# Patient Record
Sex: FEMALE | Race: WHITE | NOT HISPANIC OR LATINO | Employment: UNEMPLOYED | URBAN - METROPOLITAN AREA
[De-identification: names, ages, dates, MRNs, and addresses within clinical notes are randomized per-mention and may not be internally consistent; named-entity substitution may affect disease eponyms.]

---

## 2017-01-05 ENCOUNTER — ALLSCRIPTS OFFICE VISIT (OUTPATIENT)
Dept: OTHER | Facility: OTHER | Age: 22
End: 2017-01-05

## 2017-01-11 ENCOUNTER — GENERIC CONVERSION - ENCOUNTER (OUTPATIENT)
Dept: OTHER | Facility: OTHER | Age: 22
End: 2017-01-11

## 2017-01-18 ENCOUNTER — GENERIC CONVERSION - ENCOUNTER (OUTPATIENT)
Dept: OTHER | Facility: OTHER | Age: 22
End: 2017-01-18

## 2017-01-19 ENCOUNTER — GENERIC CONVERSION - ENCOUNTER (OUTPATIENT)
Dept: OTHER | Facility: OTHER | Age: 22
End: 2017-01-19

## 2017-01-21 ENCOUNTER — GENERIC CONVERSION - ENCOUNTER (OUTPATIENT)
Dept: OTHER | Facility: OTHER | Age: 22
End: 2017-01-21

## 2017-01-21 LAB — TSH SERPL DL<=0.05 MIU/L-ACNC: 1.12 UIU/ML (ref 0.45–4.5)

## 2017-01-24 LAB
C DIFF TOXIN B (HISTORICAL): NORMAL
MISCELLANEOUS LAB TEST RESULT (HISTORICAL): NORMAL

## 2017-02-07 ENCOUNTER — GENERIC CONVERSION - ENCOUNTER (OUTPATIENT)
Dept: OTHER | Facility: OTHER | Age: 22
End: 2017-02-07

## 2017-03-06 ENCOUNTER — GENERIC CONVERSION - ENCOUNTER (OUTPATIENT)
Dept: OTHER | Facility: OTHER | Age: 22
End: 2017-03-06

## 2017-03-21 ENCOUNTER — ALLSCRIPTS OFFICE VISIT (OUTPATIENT)
Dept: OTHER | Facility: OTHER | Age: 22
End: 2017-03-21

## 2017-04-12 ENCOUNTER — HOSPITAL ENCOUNTER (EMERGENCY)
Facility: HOSPITAL | Age: 22
Discharge: HOME | End: 2017-04-13
Attending: EMERGENCY MEDICINE | Admitting: EMERGENCY MEDICINE
Payer: COMMERCIAL

## 2017-04-12 VITALS
WEIGHT: 217 LBS | SYSTOLIC BLOOD PRESSURE: 133 MMHG | TEMPERATURE: 98.8 F | HEIGHT: 63 IN | RESPIRATION RATE: 20 BRPM | OXYGEN SATURATION: 98 % | DIASTOLIC BLOOD PRESSURE: 89 MMHG | BODY MASS INDEX: 38.45 KG/M2 | HEART RATE: 91 BPM

## 2017-04-12 DIAGNOSIS — N20.0 KIDNEY STONE: Primary | ICD-10-CM

## 2017-04-12 LAB
ANION GAP SERPL CALCULATED.3IONS-SCNC: 11 MMOL/L (ref 4–13)
BACTERIA UR QL AUTO: ABNORMAL /HPF
BASOPHILS # BLD AUTO: 0.2 THOUSANDS/ΜL (ref 0–0.1)
BASOPHILS NFR BLD AUTO: 1 % (ref 0–1)
BILIRUB UR QL STRIP: NEGATIVE
BUN SERPL-MCNC: 4 MG/DL (ref 5–25)
CALCIUM SERPL-MCNC: 8.8 MG/DL (ref 8.3–10.1)
CHLORIDE SERPL-SCNC: 102 MMOL/L (ref 100–108)
CLARITY UR: CLEAR
CLARITY, POC: NORMAL
CO2 SERPL-SCNC: 25 MMOL/L (ref 21–32)
COLOR UR: ABNORMAL
COLOR, POC: NORMAL
CREAT SERPL-MCNC: 0.7 MG/DL (ref 0.6–1.3)
EOSINOPHIL # BLD AUTO: 0.4 THOUSAND/ΜL (ref 0–0.61)
EOSINOPHIL NFR BLD AUTO: 2 % (ref 0–6)
ERYTHROCYTE [DISTWIDTH] IN BLOOD BY AUTOMATED COUNT: 13 % (ref 11.6–15.1)
EXT BILIRUBIN, UA: NORMAL
EXT BLOOD URINE: 250
EXT GLUCOSE, UA: NORMAL
EXT KETONES: NORMAL
EXT NITRITE, UA: NORMAL
EXT PH, UA: 7
EXT PROTEIN, UA: NORMAL
EXT SPECIFIC GRAVITY, UA: NORMAL
EXT UROBILINOGEN: NORMAL
GFR SERPL CREATININE-BSD FRML MDRD: >60 ML/MIN/1.73SQ M
GLUCOSE SERPL-MCNC: 85 MG/DL (ref 65–140)
GLUCOSE UR STRIP-MCNC: NEGATIVE MG/DL
HCG UR QL: NEGATIVE
HCT VFR BLD AUTO: 47.7 % (ref 37–47)
HGB BLD-MCNC: 15.8 G/DL (ref 12–16)
HGB UR QL STRIP.AUTO: ABNORMAL
KETONES UR STRIP-MCNC: NEGATIVE MG/DL
LEUKOCYTE ESTERASE UR QL STRIP: ABNORMAL
LYMPHOCYTES # BLD AUTO: 4.4 THOUSANDS/ΜL (ref 0.6–4.47)
LYMPHOCYTES NFR BLD AUTO: 23 % (ref 14–44)
MCH RBC QN AUTO: 30 PG (ref 27–31)
MCHC RBC AUTO-ENTMCNC: 33.1 G/DL (ref 31.4–37.4)
MCV RBC AUTO: 91 FL (ref 82–98)
MONOCYTES # BLD AUTO: 1.1 THOUSAND/ΜL (ref 0.17–1.22)
MONOCYTES NFR BLD AUTO: 6 % (ref 4–12)
NEUTROPHILS # BLD AUTO: 13 THOUSANDS/ΜL (ref 1.85–7.62)
NEUTS SEG NFR BLD AUTO: 68 % (ref 43–75)
NITRITE UR QL STRIP: NEGATIVE
NON-SQ EPI CELLS URNS QL MICRO: ABNORMAL /HPF
NRBC BLD AUTO-RTO: 0 /100 WBCS
PH UR STRIP.AUTO: 7 [PH] (ref 5–9)
PLATELET # BLD AUTO: 307 THOUSANDS/UL (ref 130–400)
PMV BLD AUTO: 8.9 FL (ref 8.9–12.7)
POTASSIUM SERPL-SCNC: 3.6 MMOL/L (ref 3.5–5.3)
PROT UR STRIP-MCNC: NEGATIVE MG/DL
RBC # BLD AUTO: 5.26 MILLION/UL (ref 4.2–5.4)
RBC #/AREA URNS AUTO: ABNORMAL /HPF
SODIUM SERPL-SCNC: 138 MMOL/L (ref 136–145)
SP GR UR STRIP.AUTO: 1.01 (ref 1–1.03)
UROBILINOGEN UR QL STRIP.AUTO: 0.2 E.U./DL
WBC # BLD AUTO: 19.1 THOUSAND/UL (ref 4.8–10.8)
WBC # BLD EST: 25 10*3/UL
WBC #/AREA URNS AUTO: ABNORMAL /HPF

## 2017-04-12 PROCEDURE — 85025 COMPLETE CBC W/AUTO DIFF WBC: CPT | Performed by: EMERGENCY MEDICINE

## 2017-04-12 PROCEDURE — 87086 URINE CULTURE/COLONY COUNT: CPT

## 2017-04-12 PROCEDURE — 80048 BASIC METABOLIC PNL TOTAL CA: CPT | Performed by: EMERGENCY MEDICINE

## 2017-04-12 PROCEDURE — 96374 THER/PROPH/DIAG INJ IV PUSH: CPT

## 2017-04-12 PROCEDURE — 81025 URINE PREGNANCY TEST: CPT

## 2017-04-12 PROCEDURE — 87186 SC STD MICRODIL/AGAR DIL: CPT

## 2017-04-12 PROCEDURE — 87077 CULTURE AEROBIC IDENTIFY: CPT

## 2017-04-12 PROCEDURE — 81001 URINALYSIS AUTO W/SCOPE: CPT

## 2017-04-12 PROCEDURE — 81002 URINALYSIS NONAUTO W/O SCOPE: CPT

## 2017-04-12 PROCEDURE — 96375 TX/PRO/DX INJ NEW DRUG ADDON: CPT

## 2017-04-12 PROCEDURE — 36415 COLL VENOUS BLD VENIPUNCTURE: CPT | Performed by: EMERGENCY MEDICINE

## 2017-04-12 PROCEDURE — 96361 HYDRATE IV INFUSION ADD-ON: CPT

## 2017-04-12 RX ORDER — ONDANSETRON 2 MG/ML
4 INJECTION INTRAMUSCULAR; INTRAVENOUS ONCE
Status: COMPLETED | OUTPATIENT
Start: 2017-04-12 | End: 2017-04-12

## 2017-04-12 RX ORDER — KETOROLAC TROMETHAMINE 30 MG/ML
30 INJECTION, SOLUTION INTRAMUSCULAR; INTRAVENOUS ONCE
Status: COMPLETED | OUTPATIENT
Start: 2017-04-12 | End: 2017-04-12

## 2017-04-12 RX ORDER — OXYCODONE HYDROCHLORIDE AND ACETAMINOPHEN 5; 325 MG/1; MG/1
1 TABLET ORAL EVERY 6 HOURS PRN
Qty: 10 TABLET | Refills: 0 | Status: SHIPPED | OUTPATIENT
Start: 2017-04-12 | End: 2017-04-15

## 2017-04-12 RX ORDER — ONDANSETRON 4 MG/1
4 TABLET, ORALLY DISINTEGRATING ORAL EVERY 8 HOURS PRN
Qty: 10 TABLET | Refills: 0 | Status: SHIPPED | OUTPATIENT
Start: 2017-04-12 | End: 2017-09-20

## 2017-04-12 RX ORDER — IBUPROFEN 600 MG/1
600 TABLET ORAL EVERY 6 HOURS PRN
Qty: 30 TABLET | Refills: 0 | Status: SHIPPED | OUTPATIENT
Start: 2017-04-12 | End: 2017-09-20

## 2017-04-12 RX ADMIN — SODIUM CHLORIDE 1000 ML: 0.9 INJECTION, SOLUTION INTRAVENOUS at 22:46

## 2017-04-12 RX ADMIN — ONDANSETRON 4 MG: 2 INJECTION INTRAMUSCULAR; INTRAVENOUS at 22:46

## 2017-04-12 RX ADMIN — KETOROLAC TROMETHAMINE 30 MG: 30 INJECTION, SOLUTION INTRAMUSCULAR at 22:46

## 2017-04-13 PROCEDURE — 99283 EMERGENCY DEPT VISIT LOW MDM: CPT

## 2017-04-15 LAB — BACTERIA UR CULT: NORMAL

## 2017-08-19 ENCOUNTER — ALLSCRIPTS OFFICE VISIT (OUTPATIENT)
Dept: OTHER | Facility: OTHER | Age: 22
End: 2017-08-19

## 2017-08-19 LAB
BILIRUB UR QL STRIP: NORMAL
CLARITY UR: NORMAL
COLOR UR: YELLOW
GLUCOSE (HISTORICAL): NORMAL
HGB UR QL STRIP.AUTO: NORMAL
KETONES UR STRIP-MCNC: NORMAL MG/DL
LEUKOCYTE ESTERASE UR QL STRIP: NORMAL
NITRITE UR QL STRIP: NORMAL
PH UR STRIP.AUTO: 6.5 [PH]
PROT UR STRIP-MCNC: NORMAL MG/DL
SP GR UR STRIP.AUTO: 1.01
UROBILINOGEN UR QL STRIP.AUTO: NORMAL

## 2017-09-20 ENCOUNTER — HOSPITAL ENCOUNTER (EMERGENCY)
Facility: HOSPITAL | Age: 22
Discharge: HOME | End: 2017-09-20
Attending: EMERGENCY MEDICINE | Admitting: EMERGENCY MEDICINE
Payer: COMMERCIAL

## 2017-09-20 VITALS
DIASTOLIC BLOOD PRESSURE: 89 MMHG | HEART RATE: 85 BPM | SYSTOLIC BLOOD PRESSURE: 144 MMHG | TEMPERATURE: 97.6 F | OXYGEN SATURATION: 99 % | RESPIRATION RATE: 20 BRPM

## 2017-09-20 DIAGNOSIS — M72.2 BILATERAL PLANTAR FASCIITIS: Primary | ICD-10-CM

## 2017-09-20 PROCEDURE — 99283 EMERGENCY DEPT VISIT LOW MDM: CPT

## 2017-09-20 RX ORDER — NAPROXEN 500 MG/1
500 TABLET ORAL ONCE
Status: COMPLETED | OUTPATIENT
Start: 2017-09-20 | End: 2017-09-20

## 2017-09-20 RX ORDER — TRAMADOL HYDROCHLORIDE 50 MG/1
50 TABLET ORAL EVERY 6 HOURS PRN
Qty: 30 TABLET | Refills: 0 | Status: SHIPPED | OUTPATIENT
Start: 2017-09-20 | End: 2018-09-30

## 2017-09-20 RX ORDER — TRAMADOL HYDROCHLORIDE 50 MG/1
50 TABLET ORAL ONCE
Status: COMPLETED | OUTPATIENT
Start: 2017-09-20 | End: 2017-09-20

## 2017-09-20 RX ORDER — NAPROXEN 500 MG/1
500 TABLET ORAL 2 TIMES DAILY WITH MEALS
Qty: 60 TABLET | Refills: 1 | Status: SHIPPED | OUTPATIENT
Start: 2017-09-20 | End: 2018-09-30

## 2017-09-20 RX ADMIN — NAPROXEN 500 MG: 500 TABLET ORAL at 22:55

## 2017-09-20 RX ADMIN — TRAMADOL HYDROCHLORIDE 50 MG: 50 TABLET, FILM COATED ORAL at 22:56

## 2017-09-29 NOTE — PRE-PROCEDURE INSTRUCTIONS
Pre-Surgery Instructions:   Medication Instructions    albuterol (PROVENTIL HFA,VENTOLIN HFA) 90 mcg/act inhaler Patient was instructed by Physician and understands   Fluticasone-Salmeterol (ADVAIR HFA IN) Patient was instructed by Physician and understands   levothyroxine 125 mcg tablet Patient was instructed by Physician and understands   naproxen (NAPROSYN) 500 mg tablet Patient was instructed by Physician and understands   sertraline (ZOLOFT) 50 mg tablet Patient was instructed by Physician and understands   traMADol (ULTRAM) 50 mg tablet Patient was instructed by Physician and understands  Pt to follow Dr Chastity Andres instructions   [facility] [phone]

## 2017-10-01 ENCOUNTER — ANESTHESIA EVENT (OUTPATIENT)
Dept: GASTROENTEROLOGY | Facility: AMBULARY SURGERY CENTER | Age: 22
End: 2017-10-01
Payer: COMMERCIAL

## 2017-10-02 ENCOUNTER — ANESTHESIA (OUTPATIENT)
Dept: GASTROENTEROLOGY | Facility: AMBULARY SURGERY CENTER | Age: 22
End: 2017-10-02
Payer: COMMERCIAL

## 2017-10-02 ENCOUNTER — HOSPITAL ENCOUNTER (OUTPATIENT)
Facility: AMBULARY SURGERY CENTER | Age: 22
Setting detail: OUTPATIENT SURGERY
Discharge: HOME | End: 2017-10-02
Attending: INTERNAL MEDICINE | Admitting: INTERNAL MEDICINE
Payer: COMMERCIAL

## 2017-10-02 ENCOUNTER — GENERIC CONVERSION - ENCOUNTER (OUTPATIENT)
Dept: OTHER | Facility: OTHER | Age: 22
End: 2017-10-02

## 2017-10-02 VITALS
BODY MASS INDEX: 38.45 KG/M2 | WEIGHT: 217 LBS | DIASTOLIC BLOOD PRESSURE: 76 MMHG | SYSTOLIC BLOOD PRESSURE: 118 MMHG | OXYGEN SATURATION: 100 % | HEART RATE: 80 BPM | RESPIRATION RATE: 18 BRPM | TEMPERATURE: 97.4 F | HEIGHT: 63 IN

## 2017-10-02 DIAGNOSIS — R10.9 ABDOMINAL PAIN: ICD-10-CM

## 2017-10-02 DIAGNOSIS — R19.7 DIARRHEA: ICD-10-CM

## 2017-10-02 DIAGNOSIS — R63.0 ANOREXIA: ICD-10-CM

## 2017-10-02 LAB — EXT PREGNANCY TEST URINE: NEGATIVE

## 2017-10-02 PROCEDURE — 81025 URINE PREGNANCY TEST: CPT | Performed by: ANESTHESIOLOGY

## 2017-10-02 PROCEDURE — 88342 IMHCHEM/IMCYTCHM 1ST ANTB: CPT | Performed by: INTERNAL MEDICINE

## 2017-10-02 PROCEDURE — 88305 TISSUE EXAM BY PATHOLOGIST: CPT | Performed by: INTERNAL MEDICINE

## 2017-10-02 RX ORDER — PROPOFOL 10 MG/ML
INJECTION, EMULSION INTRAVENOUS AS NEEDED
Status: DISCONTINUED | OUTPATIENT
Start: 2017-10-02 | End: 2017-10-02 | Stop reason: SURG

## 2017-10-02 RX ORDER — SODIUM CHLORIDE, SODIUM LACTATE, POTASSIUM CHLORIDE, CALCIUM CHLORIDE 600; 310; 30; 20 MG/100ML; MG/100ML; MG/100ML; MG/100ML
75 INJECTION, SOLUTION INTRAVENOUS CONTINUOUS
Status: DISCONTINUED | OUTPATIENT
Start: 2017-10-02 | End: 2017-10-02 | Stop reason: HOSPADM

## 2017-10-02 RX ADMIN — SODIUM CHLORIDE, SODIUM LACTATE, POTASSIUM CHLORIDE, AND CALCIUM CHLORIDE 75 ML/HR: .6; .31; .03; .02 INJECTION, SOLUTION INTRAVENOUS at 12:47

## 2017-10-02 RX ADMIN — PROPOFOL 40 MG: 10 INJECTION, EMULSION INTRAVENOUS at 14:17

## 2017-10-02 RX ADMIN — PROPOFOL 30 MG: 10 INJECTION, EMULSION INTRAVENOUS at 14:34

## 2017-10-02 RX ADMIN — PROPOFOL 40 MG: 10 INJECTION, EMULSION INTRAVENOUS at 14:32

## 2017-10-02 RX ADMIN — PROPOFOL 30 MG: 10 INJECTION, EMULSION INTRAVENOUS at 14:25

## 2017-10-02 RX ADMIN — PROPOFOL 30 MG: 10 INJECTION, EMULSION INTRAVENOUS at 14:26

## 2017-10-02 RX ADMIN — PROPOFOL 40 MG: 10 INJECTION, EMULSION INTRAVENOUS at 14:27

## 2017-10-02 RX ADMIN — PROPOFOL 70 MG: 10 INJECTION, EMULSION INTRAVENOUS at 14:14

## 2017-10-02 RX ADMIN — PROPOFOL 40 MG: 10 INJECTION, EMULSION INTRAVENOUS at 14:16

## 2017-10-02 RX ADMIN — PROPOFOL 30 MG: 10 INJECTION, EMULSION INTRAVENOUS at 14:19

## 2017-10-02 RX ADMIN — PROPOFOL 30 MG: 10 INJECTION, EMULSION INTRAVENOUS at 14:15

## 2017-10-02 RX ADMIN — PROPOFOL 30 MG: 10 INJECTION, EMULSION INTRAVENOUS at 14:31

## 2017-10-02 RX ADMIN — PROPOFOL 40 MG: 10 INJECTION, EMULSION INTRAVENOUS at 14:23

## 2017-10-02 RX ADMIN — PROPOFOL 30 MG: 10 INJECTION, EMULSION INTRAVENOUS at 14:30

## 2017-10-02 RX ADMIN — PROPOFOL 20 MG: 10 INJECTION, EMULSION INTRAVENOUS at 14:18

## 2017-10-02 RX ADMIN — PROPOFOL 30 MG: 10 INJECTION, EMULSION INTRAVENOUS at 14:21

## 2017-10-02 NOTE — OP NOTE
COLONOSCOPY    PROCEDURE: Colonoscopy/ Biopsy    INDICATIONS: Abdominal Pain, Chronic, Diarrhea    POST-OP DIAGNOSIS: See the impression below    SEDATION: Monitored anesthesia care, check anesthesia records    PHYSICAL EXAM:    /78   Pulse 85   Temp (!) 97 4 °F (36 3 °C) (Tympanic)   Resp 18   Ht 5' 3" (1 6 m)   Wt 98 4 kg (217 lb)   LMP 09/26/2017 (Approximate) Comment: not regular  SpO2 99%   Breastfeeding? No   BMI 38 44 kg/m²   Body mass index is 38 44 kg/m²  General: NAD  Heart: S1 & S2 normal, RRR  Lungs: CTA, No rales or rhonchi  Abdomen: Soft, nontender, nondistended, good bowel sounds    CONSENT:  Informed consent was obtained for the procedure, including sedation after explaining the risks and benefits of the procedure  Risks including but not limited to bleeding, perforation, infection, aspiration were discussed in detail  Also explained about less than 100%$ sensitivity with the exam and other alternatives  PREPARATION:   EKG tracing, pulse oximetry, blood pressure were monitored throughout the procedure  Patient was identified by myself both verbally and by visual inspection of ID band  DESCRIPTION:   Patient was placed in the left lateral decubitus position and was sedated with the above medication  Digital rectal examination was performed  The colonoscope was introduced in to the anal canal and advanced up to terminal ileum  A careful inspection was made as the colonoscope was withdrawn, including a retroflexed view of the rectum; findings and interventions are described below  Appropriate photodocumentation was obtained  The quality of the colonic preparation was good      FINDINGS:    Normal terminal ileum  Normal entire colonoscopy within normal retroflexion  Random biopsies taken throughout         IMPRESSIONS:      Normal examination to the terminal ileum  Random biopsies taken throughout the colon    RECOMMENDATIONS:    Discharge home  Resume regular diet  Resume home medications  Follow up biopsy results  Call with any abdominal pain, bleeding, fevers    COMPLICATIONS:  None; patient tolerated the procedure well      DISPOSITION: PACU           CONDITION: Stable

## 2017-10-02 NOTE — ANESTHESIA POSTPROCEDURE EVALUATION
Post-Op Assessment Note      CV Status:  Stable    Mental Status:  Awake    Hydration Status:  Stable    PONV Controlled:  None    Airway Patency:  Patent    Post Op Vitals Reviewed: Yes          Staff: Anesthesiologist           BP (!) 87/61 (10/02/17 1442)    Temp     Pulse 78 (10/02/17 1442)   Resp 18 (10/02/17 1441)    SpO2 96 % (10/02/17 1442)

## 2017-10-02 NOTE — ANESTHESIA PREPROCEDURE EVALUATION
Review of Systems/Medical History  Patient summary reviewed  Chart reviewed  No history of anesthetic complications     Cardiovascular   Pulmonary  Smoker cigarette smoker , Asthma: well controlled/ stable Asthma type of rescue: PRN nebulizer, ,        GI/Hepatic    GERD ,        Kidney stones,        Endo/Other  History of thyroid disease , hypothyroidism,      GYN       Hematology   Musculoskeletal  Obesity ,        Neurology   Psychology   Anxiety,            Physical Exam    Airway    Mallampati score: II  TM Distance: <3 FB  Neck ROM: full     Dental       Cardiovascular  Rhythm: regular, Rate: normal,     Pulmonary  Breath sounds clear to auscultation,     Other Findings        Anesthesia Plan  ASA Score- 2       Anesthesia Type- IV sedation with anesthesia        Induction- intravenous      Informed Consent  Anesthetic plan and risks discussed with patient

## 2017-10-02 NOTE — H&P
History and Physical -  [facility]  Fany Torres 23 y o  female MRN: [id number]    HPI: Fany Torres is a 23y o  year old female who presents with left sided abdominal pain and diarrhea  Review of Systems    Historical Information   Past Medical History:   Diagnosis Date    Abdominal pain     Altered bowel habits     constipation or diarrhea    Anxiety     Asthma     Back pain     Contact lens/glasses fitting     Disease of thyroid gland     hypothyroid    Heel pain, bilateral     left heel injected 17    Kidney stone      Past Surgical History:   Procedure Laterality Date    INDUCED       KNEE ARTHROSCOPY Left     cartilage shaving     Social History   History   Alcohol Use    Yes     Comment: occ     History   Drug Use No     History   Smoking Status    Current Every Day Smoker    Packs/day: 1     Years: 10 00    Types: Cigarettes   Smokeless Tobacco    Never Used     Family History   Problem Relation Age of Onset    Anxiety disorder Mother     Thyroid disease unspecified Mother      over or under active not sure    Hypothyroidism Sister     Asthma Sister        Meds/Allergies     Prescriptions Prior to Admission   Medication    albuterol (PROVENTIL HFA,VENTOLIN HFA) 90 mcg/act inhaler    Fluticasone-Salmeterol (ADVAIR HFA IN)    levothyroxine 125 mcg tablet    naproxen (NAPROSYN) 500 mg tablet    sertraline (ZOLOFT) 50 mg tablet    traMADol (ULTRAM) 50 mg tablet       Allergies   Allergen Reactions    Ciprofloxacin Other (See Comments)     Skin felt like it was "on fire"       Objective     Blood pressure 124/78, pulse 85, temperature (!) 97 4 °F (36 3 °C), temperature source Tympanic, resp  rate 18, height 5' 3" (1 6 m), weight 98 4 kg (217 lb), last menstrual period 2017, SpO2 99 %, not currently breastfeeding        PHYSICAL EXAM    Gen: NAD  CV: RRR  CHEST: Clear  ABD: soft, NT/ND  EXT: no edema  Neuro: AAO      ASSESSMENT/PLAN: This is a 23y o  year old female here for left sided abdominal pain and diarrhea       PLAN:   Procedure: egd/colonoscopy

## 2017-10-02 NOTE — OP NOTE
ESOPHAGOGASTRODUODENOSCOPY    PROCEDURE: EGD/ Biopsy    INDICATIONS: Abdominal pain, Epigastric and Diarrhea    POST-OP DIAGNOSIS: See the impression below    SEDATION: Monitored anesthesia care, check anesthesia records    PHYSICAL EXAM:    Vitals:    10/02/17 1235   BP: 124/78   Pulse: 85   Resp: 18   Temp: (!) 97 4 °F (36 3 °C)   SpO2: 99%    Body mass index is 38 44 kg/m²  General: NAD  Heart: S1 & S2 normal, RRR  Lungs: CTA, No rales or rhonchi  Abdomen: Soft, nontender, nondistended, good bowel sounds    CONSENT:  Informed consent was obtained for the procedure, including sedation after explaining the risks and benefits of the procedure  Risks including but not limited to bleeding, perforation, infection, aspiration were discussed in detail  Also explained about less than 100% sensitivity with the exam and other alternatives  PREPARATION:   EKG tracing, pulse oximetry, blood pressure were monitored throughout the procedure  Patient was identified by myself both verbally and by visual inspection of ID band  DESCRIPTION:   Patient was placed in the left lateral decubitus position and was sedated with the above medication  The gastroscope was introduced in to the oropharynx and the esophagus was intubated under direct visualization  Scope was passed down the esophagus up to 2nd part of the duodenum  A careful inspection was made as the gastroscope was withdrawn, including a retroflexed view of the stomach; findings and interventions are described below  FINDINGS:    #1  Esophagus and GEJ- grade B esophagitis    #2  Stomach- moderate gastritis with linear ulcers in the antrum, biopsies taken  Normal retroflexion    #3   Duodenum- normal duodenum biopsies taken         IMPRESSIONS:      Grade B esophagitis  Antral gastritis with linear gastric ulcers  Gastric biopsies taken  Normal duodenum, biopsied    RECOMMENDATIONS:     Discharged home  Resume regular diet  Resume home medications  Take daily PPI therapy  Follow up biopsy results  Call with any abdominal pain, bleeding, fevers    COMPLICATIONS:  None; patient tolerated the procedure well            DISPOSITION: PACU           CONDITION: Stable

## 2017-10-02 NOTE — DISCHARGE INSTRUCTIONS
Discharged home  Resume regular diet  Resume home medications  Take daily PPI therapy  Follow up biopsy results  Call with any abdominal pain, bleeding, fevers

## 2017-10-09 ENCOUNTER — GENERIC CONVERSION - ENCOUNTER (OUTPATIENT)
Dept: OTHER | Facility: OTHER | Age: 22
End: 2017-10-09

## 2017-12-26 ENCOUNTER — ALLSCRIPTS OFFICE VISIT (OUTPATIENT)
Dept: OTHER | Facility: OTHER | Age: 22
End: 2017-12-26

## 2017-12-27 ENCOUNTER — GENERIC CONVERSION - ENCOUNTER (OUTPATIENT)
Dept: OTHER | Facility: OTHER | Age: 22
End: 2017-12-27

## 2017-12-27 LAB
A/G RATIO (HISTORICAL): 1.5 (ref 1.2–2.2)
ALBUMIN SERPL BCP-MCNC: 4.5 G/DL (ref 3.5–5.5)
ALP SERPL-CCNC: 82 IU/L (ref 39–117)
ALT SERPL W P-5'-P-CCNC: 18 IU/L (ref 0–32)
AST SERPL W P-5'-P-CCNC: 15 IU/L (ref 0–40)
BILIRUB SERPL-MCNC: 0.2 MG/DL (ref 0–1.2)
BUN SERPL-MCNC: 5 MG/DL (ref 6–20)
BUN/CREA RATIO (HISTORICAL): 7 (ref 9–23)
CALCIUM SERPL-MCNC: 9.8 MG/DL (ref 8.7–10.2)
CHLORIDE SERPL-SCNC: 103 MMOL/L (ref 96–106)
CHOLEST SERPL-MCNC: 212 MG/DL (ref 100–199)
CO2 SERPL-SCNC: 24 MMOL/L (ref 18–29)
CREAT SERPL-MCNC: 0.74 MG/DL (ref 0.57–1)
EGFR AFRICAN AMERICAN (HISTORICAL): 131 ML/MIN/1.73
EGFR-AMERICAN CALC (HISTORICAL): 114 ML/MIN/1.73
GLUCOSE SERPL-MCNC: 102 MG/DL (ref 65–99)
HDLC SERPL-MCNC: 54 MG/DL
LDLC SERPL CALC-MCNC: 121 MG/DL (ref 0–99)
POTASSIUM SERPL-SCNC: 4.6 MMOL/L (ref 3.5–5.2)
SODIUM SERPL-SCNC: 142 MMOL/L (ref 134–144)
TOT. GLOBULIN, SERUM (HISTORICAL): 3.1 G/DL (ref 1.5–4.5)
TOTAL PROTEIN (HISTORICAL): 7.6 G/DL (ref 6–8.5)
TRIGL SERPL-MCNC: 186 MG/DL (ref 0–149)
TSH SERPL DL<=0.05 MIU/L-ACNC: 4.41 UIU/ML (ref 0.45–4.5)

## 2017-12-27 NOTE — PROGRESS NOTES
Assessment   1  Acute maxillary sinusitis, recurrence not specified (461 0) (J01 00)   2  GE reflux (530 81) (K21 9)   3  Hypothyroidism (244 9) (E03 9)   4  Exercise-induced bronchospasm (493 81) (J45 990)    Plan   Acute maxillary sinusitis, recurrence not specified    · Cefdinir 300 MG Oral Capsule; TAKE 2 CAPSULES DAILY   · Fluconazole 150 MG Oral Tablet; 1 Every Day x 1  Exercise-induced bronchospasm    · Advair Diskus 100-50 MCG/DOSE Inhalation Aerosol Powder Breath Activated; 1    INHALATION TWICE A DAY   · ProAir  (90 Base) MCG/ACT Inhalation Aerosol Solution; 2 puffs q4hrs    prn, swish and spit after each use  GE reflux    · Follow-up visit in 6 months Evaluation and Treatment  Follow-up  Status: Hold For -    Scheduling  Requested for: 37Yjk8868   · Avoid alcoholic beverages ; Status:Complete;   Done: 45GLE9752   · Several things can be done to help treat and prevent your gastric reflux ;    Status:Complete;   Done: 55YUH9074   · Shared Decision Making Aid given; Status:Complete;   Done: 26QXW7503  Hypothyroidism    · Levothyroxine Sodium 150 MCG Oral Tablet; TAKE 1 TABLET DAILY  Hypothyroidism, Screening for cardiovascular, respiratory, and genitourinary diseases,    Screening for diabetes mellitus (DM)    · (1) COMPREHENSIVE METABOLIC PANEL; Status:Active; Requested for:23Qas6450;    · (1) LIPID PANEL FASTING W DIRECT LDL REFLEX; Status:Active; Requested    for:22Sye3344;    · (1) TSH; Status:Active; Requested for:13Lhy6217;   Panic disorder    · Sertraline HCl - 50 MG Oral Tablet; 1 every day    Discussion/Summary   Possible side effects of new medications were reviewed with the patient/guardian today  The treatment plan was reviewed with the patient/guardian  The patient/guardian understands and agrees with the treatment plan      Provider Comments   Provider Comments:    sinusitis new stable f/u in future with abx      Chief Complaint   Pt c/o cough for the past days  er/cma        History of Present Illness   HPI: tsh stable last march 2017 helps, less rescue, rare before sick helps, less episodes, controlled, few months on ppi saw pud data with increased risk of ischemic CVA and chronic kidney damage with PPI use advised  Kutty smoking   clogged and nasal dc is green rescue use throat contacts sick    Gastroesophageal Reflux Disease (Follow-Up): The patient is being seen for follow-up of gastroesophageal reflux disease  The patient reports no change in the condition  Recent data with increased risk of ischemic CVA and chronic kidney damage with PPI use advised  She has had no significant interval events  The patient is currently asymptomatic  Hypothyroidism (Follow-Up): The patient is being seen for follow-up of hypothyroidism of undetermined etiology  The patient reports no change in the condition  Interval Events: needs q6m f/u   She has had no significant interval events  Interval symptoms:  new onset of weight gain  Exercise Induced Bronchospasm (Follow-Up): The patient is being seen for follow-up of exercise-induced bronchospasm  The patient reports doing well  She has had no significant interval events  Interval symptoms:  denies wheezing  Review of Systems        Cardiovascular: no chest pain  Respiratory: shortness of breath,-- cough-- and-- wheezing  Neurological: no dizziness-- and-- no fainting  Active Problems   1  Acquired ankle/foot deformity (736 70) (M21 969)   2  Anxiety (300 00) (F41 9)   3  Body mass index (BMI) of 38 0 to 38 9 in adult (V85 38) (Z68 38)   4  Diarrhea (787 91) (R19 7)   5  Difficulty walking (719 7) (R26 2)   6  Exercise-induced bronchospasm (493 81) (J45 990)   7  Fatigue (780 79) (R53 83)   8  GE reflux (530 81) (K21 9)   9  Heel pain (729 5) (M79 673)   10  Hemorrhoids (455 6) (K64 9)   11  Hypothyroidism (244 9) (E03 9)   12  Immunization due (V05 9) (Z23)   13  Nicotine dependence (305 1) (F17 200)   14   Panic disorder (300 01) (F41 0)   15  Pes planus, congenital (754 61) (Q66 50)   16  Plantar fibromatosis (728 71) (M72 2)   17  Screening for cardiovascular, respiratory, and genitourinary diseases      (V81 2,V81 4,V81 6) (Z13 6,Z13 83,Z13 89)   18  Screening for diabetes mellitus (DM) (V77 1) (Z13 1)   19  Tobacco use (305 1) (Z72 0)    Past Medical History   1  Acute asthmatic bronchitis (493 90) (J45 909)   2  Acute bronchitis (466 0) (J20 9)   3  Acute maxillary sinusitis (461 0) (J01 00)   4  History of Acute maxillary sinusitis (461 0) (J01 00)   5  History of Acute maxillary sinusitis, recurrence not specified (461 0) (J01 00)   6  History of Acute medial meniscal tear (836 0) (S83 249A)   7  History of Acute pharyngitis (462) (J02 9)   8  History of Acute upper respiratory infection (465 9) (J06 9)   9  History of Acute UTI (599 0) (N39 0)   10  History of Cellulitis (682 9) (L03 90)   11  History of Cough (786 2) (R05)   12  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)   13  History of Hand paresthesia (782 0) (R20 2)   14  History of abdominal pain (V13 89) (Z87 898)   15  History of acute sinusitis (V12 69) (Z87 09)   16  History of nausea and vomiting (V12 79) (Z87 898)   17  History of Joint pain, knee (719 46) (M25 569)   18  History of Loss of appetite (783 0) (R63 0)   19  History of Meniscus tear (836 2) (S83 209A)   20  History of Otitis media (382 9) (H66 90)   21  History of Shoulder joint pain, left   22  History of Strep throat (034 0) (J02 0)    Family History   Mother    1  Family history of Anxiety   2  Denied: Family history of Colon cancer   3  Denied: Family history of Crohn's disease without complication, unspecified     gastrointestinal tract location   4  Family history of hypothyroidism (V18 19) (Z83 49)   5  Denied: Family history of liver disease  Father    10  Denied: Family history of Colon cancer   7   Denied: Family history of Crohn's disease without complication, unspecified gastrointestinal tract location   8  Denied: Family history of liver disease  Maternal Relatives    9  Family history of Cancer (199 1) (C80 1)  Family History Reviewed: The family history was reviewed and updated today  Social History    · Alcohol use   · Current every day smoker (305 1) (F17 200)   · Tobacco use (305 1) (Z72 0)  The social history was reviewed and updated today  Surgical History   1  History of Knee Surgery    Current Meds    1  Advair Diskus 100-50 MCG/DOSE Inhalation Aerosol Powder Breath Activated; 1     INHALATION TWICE A DAY; Therapy: 99Upf8876 to (Last Rx:21Mar2017)  Requested for: 21Mar2017 Ordered   2  Levothyroxine Sodium 150 MCG Oral Tablet; TAKE 1 TABLET DAILY; Therapy: 91NIQ8536 to (Evaluate:20Jan2018)  Requested for: 04Tas8351; Last     Rx:21Dec2017 Ordered   3  Pantoprazole Sodium 40 MG Oral Tablet Delayed Release; Take 1 tablet twice daily; Therapy: 92WPI3488 to (Evaluate:30Jan2018)  Requested for: 31MGW6483; Last     Rx:02Oct2017 Ordered   4  ProAir  (90 Base) MCG/ACT Inhalation Aerosol Solution; 2 puffs q4hrs prn, swish     and spit after each use; Therapy: 72KBC9602 to (Last Rx:21Mar2017)  Requested for: 21Mar2017 Ordered   5  Sertraline HCl - 50 MG Oral Tablet; 1 every day; Therapy: 46TMJ2848 to (Last Rx:21Mar2017)  Requested for: 21Mar2017 Ordered    Allergies   1  Cipro   2  Cipro    Vitals    Recorded: 21ARQ7007 01:41PM   Temperature 98 2 F   Heart Rate 76   Respiration 20   Systolic 623   Diastolic 76   Height 5 ft 4 in   Weight 225 lb    BMI Calculated 38 62   BSA Calculated 2 06   LMP 50UHS8380     Physical Exam        Constitutional      General appearance: No acute distress, well appearing and well nourished  Eyes      Conjunctiva and lids: No swelling, erythema or discharge  Pupils and irises: Equal, round and reactive to light         Ears, Nose, Mouth, and Throat      External inspection of ears and nose: Normal  Otoscopic examination: Tympanic membranes translucent with normal light reflex  Canals patent without erythema  Nasal mucosa, septum, and turbinates: Abnormal   The bilateral nasal mucosa was boggy,-- edematous-- and-- red  Oropharynx: Normal with no erythema, edema, exudate or lesions  Pulmonary      Respiratory effort: No increased work of breathing or signs of respiratory distress  Auscultation of lungs: Clear to auscultation  Cardiovascular      Auscultation of heart: Normal rate and rhythm, normal S1 and S2, without murmurs  Examination of extremities for edema and/or varicosities: Normal        Carotid pulses: Normal        Abdomen      Abdomen: Abnormal   The abdomen was obese  Lymphatic      Palpation of lymph nodes in neck: No lymphadenopathy  Musculoskeletal      Gait and station: Normal        Digits and nails: Normal without clubbing or cyanosis  Inspection/palpation of joints, bones, and muscles: Normal        Skin      Skin and subcutaneous tissue: Normal without rashes or lesions  Psychiatric      Mood and affect: Normal           Results/Data   PHQ-2 Adult Depression Screening 38Erq7564 01:43PM User, [facility]      Test Name Result Flag Reference   PHQ-2 Adult Depression Score 0     Over the last two weeks, how often have you been bothered by any of the following problems?      Little interest or pleasure in doing things: Not at all - 0     Feeling down, depressed, or hopeless: Not at all - 0   PHQ-2 Adult Depression Screening Negative          Signatures    Electronically signed by : Marry Robles DO; Dec 27 2017 12:23AM EST                       (Author)

## 2017-12-28 LAB
ENDOMYSIAL IGA ANTIBODY (HISTORICAL): NEGATIVE
IGA (HISTORICAL): 280 MG/DL (ref 87–352)
INTERPRETATION (HISTORICAL): NORMAL
TTG IGA (HISTORICAL): <2 U/ML (ref 0–3)

## 2018-01-01 ENCOUNTER — GENERIC CONVERSION - ENCOUNTER (OUTPATIENT)
Dept: OTHER | Facility: OTHER | Age: 23
End: 2018-01-01

## 2018-01-11 NOTE — MISCELLANEOUS
Excuse from work on 1/23/16 and 1/27/16 due to illness      Electronically signed by:Ricky Rubin DO  Jan 27 2016  4:02PM EST Review

## 2018-01-11 NOTE — PROGRESS NOTES
Assessment    1  Acute maxillary sinusitis, recurrence not specified (461 0) (J01 00)   2  Hypothyroidism (244 9) (E03 9)   3  Tobacco use (305 1) (Z72 0)    Plan  Acute maxillary sinusitis, recurrence not specified    · Fluconazole 150 MG Oral Tablet; 1 Every Day x 1   · Clarithromycin 500 MG Oral Tablet; TAKE 1 TABLET TWICE DAILY UNTIL  FINISHED  SocHx: Tobacco use    · Decreasing the stress in your life may help your condition improve ; Status:Complete;    Done: 37USM4534   · Shared Decision Making Aid given; Status:Complete;   Done: 22MER7085   · You need to quit smoking ; Status:Complete;   Done: 93KLH6411    Discussion/Summary    Mucinex D   f/u if no better  The patient was counseled regarding risk factor reductions, impressions, risks and benefits of treatment options, importance of compliance with treatment  Chief Complaint  pt present c/o cough and cold  hg      History of Present Illness  HPI: cough, cold  bad  2w  using essential oils  phlegm  yellowish  darker green for a while  was in ER 1w ago  dx with allergies/asthma  given prednisone  not better  no otc except last pm  missed work days due to illness       Review of Systems    Cardiovascular: no chest pain  Neurological: no dizziness  Active Problems    1  Anxiety (300 00) (F41 9)   2  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)   3  Exercise-induced bronchospasm (493 81) (J45 990)   4  Fatigue (780 79) (R53 83)   5  Hand paresthesia (782 0) (R20 2)   6  Hypothyroidism (244 9) (E03 9)   7  Immunization due (V05 9) (Z23)   8  Shoulder joint pain, left   9  Tobacco use (305 1) (Z72 0)    Past Medical History    1  Acute maxillary sinusitis (461 0) (J01 00)   2  History of Acute maxillary sinusitis (461 0) (J01 00)   3  History of Acute medial meniscal tear (836 0) (S83 249A)   4  History of Acute pharyngitis (462) (J02 9)   5  Acute upper respiratory infection (465 9) (J06 9)   6  History of Cellulitis (682 9) (L03 90)   7   History of Cough (786  2) (R05)   8  History of acute sinusitis (V12 69) (Z87 09)   9  History of Joint pain, knee (719 46) (M25 569)   10  History of Meniscus tear (836 2) (S83 209A)   11  History of Otitis media (382 9) (H66 90)   12  History of Strep throat (034 0) (J02 0)    Family History    1  Family history of Anxiety   2  Family history of hypothyroidism (V18 19) (Z83 49)    3  Family history of Cancer (199 1) (C80 1)  Family History Reviewed: The family history was reviewed and updated today  Social History    · Alcohol use   · Current Every Day Smoker (305 1)   · Tobacco use (305 1) (Z72 0)  The social history was reviewed and is unchanged  Current Meds   1  Escitalopram Oxalate 10 MG Oral Tablet; Take 1 tablet daily; Therapy: 91QJW6847 to (Last Rx:18Mar2015)  Requested for: 53WWP2587 Ordered   2  Levothyroxine Sodium 125 MCG Oral Tablet; Take 1 tablet daily; Therapy: 39YOD4482 to (Last Rx:32Nog9622)  Requested for: 00ZAQ6546 Ordered   3  Proventil  (90 Base) MCG/ACT Inhalation Aerosol Solution; 2 puffs q4hrs prn,   swish and spit after each use; Therapy: 41EEO0901 to (Last Rx:18Tzf5340)  Requested for: 26Awn1527 Ordered   4  Qvar 80 MCG/ACT Inhalation Aerosol Solution; USE 1 PUFF TWICE DAILY  RINSE   MOUTH AFTER USE; Therapy: 43DON8676 to (Last Rx:89Dqc7784)  Requested for: 72Pik8852 Ordered    Allergies    1  Cipro    Vitals   Recorded: 02JRO1847 03:33PM   Temperature 98 6 F   Heart Rate 92   Respiration 18   Systolic 913   Diastolic 80   Height 5 ft 4 5 in   Weight 230 lb    BMI Calculated 38 87   BSA Calculated 2 09     Physical Exam    Constitutional   General appearance: No acute distress, well appearing and well nourished  Eyes   Conjunctiva and lids: No swelling, erythema or discharge  Pupils and irises: Equal, round and reactive to light      Ears, Nose, Mouth, and Throat   External inspection of ears and nose: Normal     Otoscopic examination: Tympanic membranes translucent with normal light reflex  Canals patent without erythema  Nasal mucosa, septum, and turbinates: Normal without edema or erythema  Oropharynx: Normal with no erythema, edema, exudate or lesions  Pulmonary   Respiratory effort: Abnormal   Respiratory Findings: barky cough and audible wheezing, but no stridor  Auscultation of lungs: Clear to auscultation  Cardiovascular   Auscultation of heart: Normal rate and rhythm, normal S1 and S2, without murmurs  Examination of extremities for edema and/or varicosities: Normal     Carotid pulses: Normal     Lymphatic   Palpation of lymph nodes in neck: No lymphadenopathy  Musculoskeletal   Gait and station: Normal     Digits and nails: Normal without clubbing or cyanosis  Inspection/palpation of joints, bones, and muscles: Normal     Skin   Skin and subcutaneous tissue: Normal without rashes or lesions      Psychiatric   Mood and affect: Normal          Signatures   Electronically signed by : Estuardo Gamboa DO; Jan 27 2016  9:07PM EST                       (Author)

## 2018-01-12 NOTE — RESULT NOTES
Verified Results  (1) TSH 01HAT8068 01:08PM Sabage Sjogren     Test Name Result Flag Reference   TSH 1 120 uIU/mL  0 450-4 500       Discussion/Summary   Please schedule an office appointment     Dr Naeem Isidro

## 2018-01-12 NOTE — RESULT NOTES
Verified Results  (1) TSH 38Erd7646 12:49PM Angelique Garcia     Test Name Result Flag Reference   TSH 0 644 uIU/mL  0 450-4 500       Plan  Hypothyroidism    · Levothyroxine Sodium 150 MCG Oral Tablet; TAKE 1 TABLET DAILY

## 2018-01-13 VITALS
OXYGEN SATURATION: 98 % | BODY MASS INDEX: 38.07 KG/M2 | HEART RATE: 98 BPM | WEIGHT: 223 LBS | RESPIRATION RATE: 16 BRPM | HEIGHT: 64 IN | DIASTOLIC BLOOD PRESSURE: 90 MMHG | SYSTOLIC BLOOD PRESSURE: 126 MMHG | TEMPERATURE: 97.8 F

## 2018-01-13 NOTE — RESULT NOTES
Discussion/Summary   Please inform the patient that there was very mild inflammation in her small intestine  I will order labs for celiac testing, please mailed to the patient  Biopsies from stomach were negative for H pylori  Biopsies for colon showed very mild inflammation with lymphocytes as well  Celiac sprue or food allergies could explain this  Please make sure that the patient follows up in the office in the next 1 to 2 months and has laboratory studies completed  She can take Imodium as needed for now  Verified Results  (1) TISSUE EXAM [id number] 02:24PM Orysia Parrot     Test Name Result Flag Reference   LAB AP CASE REPORT (Report)     Surgical Pathology Report             Case: [id number]                   Authorizing Provider: Angélica Guerrero MD      Collected:      10/02/2017 1424        Ordering Location:   [facility]  Received:      10/02/2017 [address] [place] [address]                                     Pathologist:      Yoni Castro MD                                 Specimens:  A) - Duodenum, duodenal bxs- r/o celiac sprue                             B) - Stomach, gastric bxs- r/o h-pylori                                C) - Colon, random colon bxs- r/o microscopic colitis   LAB AP FINAL DIAGNOSIS (Report)     A  Duodenum (biopsy):    - Small bowel mucosa with mild duodenitis including focal mild increase   in intraepithelial lymphocytes  - No villous atrophy     - No active inflammation, granulomas, organisms, dysplasia or neoplasia   identified  Comment:  Correlation with celiac serologic studies may be considered as clinically   indicated  B  Stomach (biopsy):    - Minimal chronic inactive gastritis involving antral and oxyntic   mucosa  - Suggestion of medication (PPI) effect  - Immunostain for H  pylori (with appropriate positive control) is   negative  - No intestinal metaplasia, dysplasia or neoplasia identified      C  Colon (random biopsy):    - Colonic mucosa with mild non-specific increase in intraepithelial   lymphocytes (correlate clinically)  - No granulomas, dysplasia or neoplasia identified  Electronically signed by Melissa Perry MD on 10/5/2017 at 12:49 PM   LAB AP NOTE      Interpretation performed at [facility], [address] [facility], [address] [place] [id number]  LAB AP SURGICAL ADDITIONAL INFORMATION (Report)     All controls performed with the immunohistochemical stains reported above   reacted appropriately  These tests were developed and their performance   characteristics determined by [facility] or   [address] [place] [address]  They may not be cleared or approved by the [place]  Food and Drug Administration  The FDA has determined that such clearance   or approval is not necessary  These tests are used for clinical purposes  They should not be regarded as investigational or for research  This   laboratory has been approved by [place] [address], designated as a high-complexity   laboratory and is qualified to perform these tests  LAB AP GROSS DESCRIPTION (Report)     A  The specimen is received in formalin, labeled with the patient's name   and hospital number, and is designated Duodenal biopsies  The specimen   consists of an aggregate of tan to pink soft tissue fragments measuring   1 1 x 0 6 x 0 1 cm  Entirely submitted  One cassette  B  The specimen is received in formalin, labeled with the patient's name   and hospital number, and is designated Gastric biopsies  The specimen   consists of 5 white to tan soft tissue fragments ranging in greatest   dimension from 0 3 to 0 5 centimeters  Entirely submitted  One cassette  C  The specimen is received in formalin, labeled with the patient's name   and hospital number, and is designated Random colon biopsies  The   specimen consists of an aggregate of white to tan soft tissue fragments   measuring 1 0 x 0 8 x 0 2 cm  Entirely submitted  One cassette      Note: The estimated total formalin fixation time based upon information   provided by the submitting clinician and the standard processing schedule   is less than 72 hours  LMS   LAB AP CLINICAL INFORMATION      Anorexia  Diarrhea  Abdominal pain  Plan  Diarrhea    · (1) CELIAC DISEASE AB PROFILE; Status:Active;  Requested EIC:[id number];

## 2018-01-14 VITALS
RESPIRATION RATE: 18 BRPM | HEART RATE: 80 BPM | DIASTOLIC BLOOD PRESSURE: 70 MMHG | BODY MASS INDEX: 37.05 KG/M2 | TEMPERATURE: 97.2 F | SYSTOLIC BLOOD PRESSURE: 110 MMHG | HEIGHT: 64 IN | WEIGHT: 217 LBS

## 2018-01-15 VITALS
RESPIRATION RATE: 16 BRPM | BODY MASS INDEX: 37.22 KG/M2 | WEIGHT: 218 LBS | DIASTOLIC BLOOD PRESSURE: 72 MMHG | TEMPERATURE: 96.9 F | HEART RATE: 80 BPM | HEIGHT: 64 IN | SYSTOLIC BLOOD PRESSURE: 104 MMHG

## 2018-01-16 NOTE — MISCELLANEOUS
We are showing a missed appointment for your procedure scheduled on _Feb 15, 2017____  Please contact our office at your earliest convenience to  reschedule your Colonoscopy/ EGD _  As a courtesy, we kindly request a 24 hour notice of cancellation so that we may offer the appointment slot to another patient in need        Thank you,   [facility]  Dr Max Bernal   [phone]        Electronically signed Cosmo Hernandez   Mar  6 2017  1:07PM EST Acknowledgement     Electronically signed Cosmo Hernandez   TNS  [phone]  1:07PM EST Author     Electronically signed Cosmo Hernandez   LVE  [phone]  1:08PM EST Author

## 2018-01-17 NOTE — RESULT NOTES
Verified Results  (LC) TSH Rfx on Abnormal to Free T4 [id number] 02:08PM Thad HoughLele     Test Name Result Flag Reference   TSH 1 810 uIU/mL  0 450-4 500       Discussion/Summary   Obi Lorenzo- I attempted to call you, but cannot get through  continue your current dose of Levothyroxine  The repeat TSH is normal   Repeat labs in 2 months and please schedule ultrasound of your thyroid as we discussed  Please call office for order for repeat labs    Adriana Mclain

## 2018-01-18 NOTE — RESULT NOTES
Message   Please inform patient that C  difficile studies were negative  Verified Results  ([facility]) C difficile, Cytotoxin B [id number] 10:00AM Tenzin Muller     Test Name Result Flag Reference   C difficile, Cytotoxin B Final report     Reference Range: Negative   Result 1 Comment     Negative  No cytotoxin detected

## 2018-01-22 VITALS
TEMPERATURE: 98.2 F | DIASTOLIC BLOOD PRESSURE: 76 MMHG | BODY MASS INDEX: 38.41 KG/M2 | RESPIRATION RATE: 20 BRPM | WEIGHT: 225 LBS | HEART RATE: 76 BPM | SYSTOLIC BLOOD PRESSURE: 112 MMHG | HEIGHT: 64 IN

## 2018-01-23 NOTE — RESULT NOTES
Discussion/Summary   Sugar is in prediabetes range but kidneys, minerals and liver are normal       Cholesterol profile is normal/ok  Diet/exercise/weight loss is recommended  Thyroid level is normal/good    Dr Maco Harvey        Verified Results  (1) COMPREHENSIVE METABOLIC PANEL [id number] 29:44XL Mal Core     Test Name Result Flag Reference   Glucose, Serum 102 mg/dL H 65-99   BUN 5 mg/dL L 6-20   Creatinine, Serum 0 74 mg/dL  0 57-1 00   BUN/Creatinine Ratio 7 L 9-23   Sodium, Serum 142 mmol/L  134-144   Potassium, Serum 4 6 mmol/L  3 5-5 2   Chloride, Serum 103 mmol/L     Carbon Dioxide, Total 24 mmol/L  18-29   Calcium, Serum 9 8 mg/dL  8 7-10 2   Protein, Total, Serum 7 6 g/dL  6 0-8 5   Albumin, Serum 4 5 g/dL  3 5-5 5   Globulin, Total 3 1 g/dL  1 5-4 5   A/G Ratio 1 5  1 2-2 2   Bilirubin, Total 0 2 mg/dL  0 0-1 2   Alkaline Phosphatase, S 82 IU/L     AST (SGOT) 15 IU/L  0-40   ALT (SGPT) 18 IU/L  0-32   eGFR If NonAfricn Am 114 mL/min/1 73  >59   eGFR If Africn Am 131 mL/min/1 73  >59     (1) LIPID PANEL FASTING W DIRECT LDL REFLEX [id number] 10:25AM Mal Core     Test Name Result Flag Reference   Cholesterol, Total 212 mg/dL H 100-199   Triglycerides 186 mg/dL H 0-149   HDL Cholesterol 54 mg/dL  >39   LDL Cholesterol Calc 121 mg/dL H 0-99     (1) TSH [id number] 10:25AM Mal Core     Test Name Result Flag Reference   TSH 4 410 uIU/mL  0 450-4 500

## 2018-02-06 DIAGNOSIS — E03.9 HYPOTHYROIDISM, UNSPECIFIED TYPE: Primary | ICD-10-CM

## 2018-02-07 PROBLEM — F41.0 PANIC DISORDER: Status: ACTIVE | Noted: 2017-03-21

## 2018-02-07 PROBLEM — K64.9 HEMORRHOIDS: Status: ACTIVE | Noted: 2017-01-05

## 2018-02-07 PROBLEM — K21.9 GE REFLUX: Status: ACTIVE | Noted: 2017-10-02

## 2018-02-07 PROBLEM — R73.01 IFG (IMPAIRED FASTING GLUCOSE): Status: ACTIVE | Noted: 2017-12-27

## 2018-02-07 RX ORDER — DICYCLOMINE HYDROCHLORIDE 10 MG/1
1 CAPSULE ORAL 3 TIMES DAILY
COMMUNITY
Start: 2017-01-05 | End: 2018-09-30

## 2018-02-07 RX ORDER — LEVOTHYROXINE SODIUM 0.12 MG/1
125 TABLET ORAL EVERY MORNING
Qty: 30 TABLET | Refills: 3 | Status: SHIPPED | OUTPATIENT
Start: 2018-02-07 | End: 2018-02-13 | Stop reason: SDUPTHER

## 2018-02-07 RX ORDER — LEVOTHYROXINE SODIUM 0.12 MG/1
125 TABLET ORAL EVERY MORNING
Qty: 90 TABLET | Refills: 1 | Status: SHIPPED | OUTPATIENT
Start: 2018-02-07 | End: 2018-02-13 | Stop reason: SDUPTHER

## 2018-02-07 RX ORDER — ALBUTEROL SULFATE 90 UG/1
2 AEROSOL, METERED RESPIRATORY (INHALATION) EVERY 4 HOURS PRN
COMMUNITY
Start: 2014-12-01 | End: 2018-09-30

## 2018-02-07 RX ORDER — LEVOTHYROXINE SODIUM 0.15 MG/1
1 TABLET ORAL DAILY
COMMUNITY
Start: 2015-12-18 | End: 2018-02-12 | Stop reason: SDUPTHER

## 2018-02-07 RX ORDER — PANTOPRAZOLE SODIUM 40 MG/1
1 TABLET, DELAYED RELEASE ORAL 2 TIMES DAILY
COMMUNITY
Start: 2017-10-02 | End: 2018-09-30

## 2018-02-12 DIAGNOSIS — E03.9 HYPOTHYROIDISM, UNSPECIFIED TYPE: Primary | ICD-10-CM

## 2018-02-12 RX ORDER — LEVOTHYROXINE SODIUM 0.15 MG/1
150 TABLET ORAL DAILY
Qty: 30 TABLET | Refills: 5 | Status: SHIPPED | OUTPATIENT
Start: 2018-02-12 | End: 2019-01-14 | Stop reason: SDUPTHER

## 2018-02-13 NOTE — TELEPHONE ENCOUNTER
Eden from [facility] calling regarding Levothyroxine  What dosage is right? Looks like there were 2 different prescriptions sent      thanks

## 2018-02-13 NOTE — TELEPHONE ENCOUNTER
Please advise Levothyroxine  125mcg and 150mcg are both active  Please advise  Irvin Jones is waiting for a decision    [facility]

## 2018-02-26 ENCOUNTER — TELEPHONE (OUTPATIENT)
Dept: FAMILY MEDICINE CLINIC | Facility: CLINIC | Age: 23
End: 2018-02-26

## 2018-02-26 DIAGNOSIS — N76.0 ACUTE VAGINITIS: Primary | ICD-10-CM

## 2018-02-26 RX ORDER — FLUCONAZOLE 150 MG/1
150 TABLET ORAL ONCE
Qty: 1 TABLET | Refills: 0 | Status: SHIPPED | OUTPATIENT
Start: 2018-02-26 | End: 2018-02-26

## 2018-02-26 RX ORDER — NORGESTIMATE AND ETHINYL ESTRADIOL 0.25-0.035
KIT ORAL
COMMUNITY
Start: 2018-01-23 | End: 2018-09-30

## 2018-02-26 NOTE — TELEPHONE ENCOUNTER
Pt calling for medication for a possible yeast infection-- medication goes to [facility]-please let pt know when this is done-lj

## 2018-09-30 ENCOUNTER — APPOINTMENT (EMERGENCY)
Dept: RADIOLOGY | Facility: HOSPITAL | Age: 23
End: 2018-09-30
Payer: COMMERCIAL

## 2018-09-30 ENCOUNTER — HOSPITAL ENCOUNTER (EMERGENCY)
Facility: HOSPITAL | Age: 23
Discharge: HOME | End: 2018-09-30
Attending: EMERGENCY MEDICINE
Payer: COMMERCIAL

## 2018-09-30 VITALS
TEMPERATURE: 98.9 F | WEIGHT: 202.2 LBS | OXYGEN SATURATION: 100 % | HEIGHT: 64 IN | HEART RATE: 116 BPM | BODY MASS INDEX: 34.52 KG/M2 | RESPIRATION RATE: 16 BRPM | SYSTOLIC BLOOD PRESSURE: 137 MMHG | DIASTOLIC BLOOD PRESSURE: 97 MMHG

## 2018-09-30 DIAGNOSIS — N39.0 UTI (URINARY TRACT INFECTION): Primary | ICD-10-CM

## 2018-09-30 LAB
ANION GAP SERPL CALCULATED.3IONS-SCNC: 8 MMOL/L (ref 4–13)
BACTERIA UR QL AUTO: ABNORMAL /HPF
BASOPHILS # BLD AUTO: 0.09 THOUSANDS/ΜL (ref 0–0.1)
BASOPHILS NFR BLD AUTO: 1 % (ref 0–1)
BILIRUB UR QL STRIP: NEGATIVE
BUN SERPL-MCNC: 5 MG/DL (ref 5–25)
CALCIUM SERPL-MCNC: 9 MG/DL (ref 8.3–10.1)
CHLORIDE SERPL-SCNC: 104 MMOL/L (ref 100–108)
CLARITY UR: CLEAR
CO2 SERPL-SCNC: 26 MMOL/L (ref 21–32)
COLOR UR: YELLOW
CREAT SERPL-MCNC: 0.71 MG/DL (ref 0.6–1.3)
EOSINOPHIL # BLD AUTO: 0.23 THOUSAND/ΜL (ref 0–0.61)
EOSINOPHIL NFR BLD AUTO: 1 % (ref 0–6)
ERYTHROCYTE [DISTWIDTH] IN BLOOD BY AUTOMATED COUNT: 12.4 % (ref 11.6–15.1)
EXT PREG TEST URINE: NEGATIVE
GFR SERPL CREATININE-BSD FRML MDRD: 119 ML/MIN/1.73SQ M
GLUCOSE SERPL-MCNC: 82 MG/DL (ref 65–140)
GLUCOSE UR STRIP-MCNC: NEGATIVE MG/DL
HCT VFR BLD AUTO: 46.8 % (ref 34.8–46.1)
HGB BLD-MCNC: 15.5 G/DL (ref 11.5–15.4)
HGB UR QL STRIP.AUTO: ABNORMAL
IMM GRANULOCYTES # BLD AUTO: 0.09 THOUSAND/UL (ref 0–0.2)
IMM GRANULOCYTES NFR BLD AUTO: 1 % (ref 0–2)
KETONES UR STRIP-MCNC: NEGATIVE MG/DL
LEUKOCYTE ESTERASE UR QL STRIP: NEGATIVE
LYMPHOCYTES # BLD AUTO: 5.4 THOUSANDS/ΜL (ref 0.6–4.47)
LYMPHOCYTES NFR BLD AUTO: 30 % (ref 14–44)
MCH RBC QN AUTO: 30.3 PG (ref 26.8–34.3)
MCHC RBC AUTO-ENTMCNC: 33.1 G/DL (ref 31.4–37.4)
MCV RBC AUTO: 92 FL (ref 82–98)
MONOCYTES # BLD AUTO: 1 THOUSAND/ΜL (ref 0.17–1.22)
MONOCYTES NFR BLD AUTO: 6 % (ref 4–12)
NEUTROPHILS # BLD AUTO: 11.33 THOUSANDS/ΜL (ref 1.85–7.62)
NEUTS SEG NFR BLD AUTO: 61 % (ref 43–75)
NITRITE UR QL STRIP: NEGATIVE
NON-SQ EPI CELLS URNS QL MICRO: ABNORMAL /HPF
NRBC BLD AUTO-RTO: 0 /100 WBCS
PH UR STRIP.AUTO: 6.5 [PH] (ref 5–9)
PLATELET # BLD AUTO: 356 THOUSANDS/UL (ref 149–390)
PMV BLD AUTO: 10.8 FL (ref 8.9–12.7)
POTASSIUM SERPL-SCNC: 3.6 MMOL/L (ref 3.5–5.3)
PROT UR STRIP-MCNC: NEGATIVE MG/DL
RBC # BLD AUTO: 5.11 MILLION/UL (ref 3.81–5.12)
RBC #/AREA URNS AUTO: ABNORMAL /HPF
SODIUM SERPL-SCNC: 138 MMOL/L (ref 136–145)
SP GR UR STRIP.AUTO: 1.02 (ref 1–1.03)
UROBILINOGEN UR QL STRIP.AUTO: 0.2 E.U./DL
WBC # BLD AUTO: 18.14 THOUSAND/UL (ref 4.31–10.16)
WBC #/AREA URNS AUTO: ABNORMAL /HPF

## 2018-09-30 PROCEDURE — 80048 BASIC METABOLIC PNL TOTAL CA: CPT | Performed by: PHYSICIAN ASSISTANT

## 2018-09-30 PROCEDURE — 85025 COMPLETE CBC W/AUTO DIFF WBC: CPT | Performed by: PHYSICIAN ASSISTANT

## 2018-09-30 PROCEDURE — 36415 COLL VENOUS BLD VENIPUNCTURE: CPT | Performed by: PHYSICIAN ASSISTANT

## 2018-09-30 PROCEDURE — 74176 CT ABD & PELVIS W/O CONTRAST: CPT

## 2018-09-30 PROCEDURE — 81001 URINALYSIS AUTO W/SCOPE: CPT | Performed by: PHYSICIAN ASSISTANT

## 2018-09-30 PROCEDURE — 81025 URINE PREGNANCY TEST: CPT | Performed by: PHYSICIAN ASSISTANT

## 2018-09-30 PROCEDURE — 99284 EMERGENCY DEPT VISIT MOD MDM: CPT

## 2018-09-30 RX ORDER — CEPHALEXIN 500 MG/1
500 CAPSULE ORAL EVERY 12 HOURS SCHEDULED
Qty: 14 CAPSULE | Refills: 0 | Status: SHIPPED | OUTPATIENT
Start: 2018-09-30 | End: 2018-10-07

## 2018-09-30 NOTE — DISCHARGE INSTRUCTIONS
Urinary Tract Infection in Women   AMBULATORY CARE:   A urinary tract infection (UTI)  is caused by bacteria that get inside your urinary tract  Most bacteria that enter your urinary tract come out when you urinate  If the bacteria stay in your urinary tract, you may get an infection  Your urinary tract includes your kidneys, ureters, bladder, and urethra  Urine is made in your kidneys, and it flows from the ureters to the bladder  Urine leaves the bladder through the urethra  A UTI is more common in your lower urinary tract, which includes your bladder and urethra  Common symptoms include the following:   · Urinating more often or waking from sleep to urinate    · Pain or burning when you urinate    · Pain or pressure in your lower abdomen     · Urine that smells bad    · Blood in your urine    · Leaking urine  Seek care immediately if:   · You are urinating very little or not at all  · You have a high fever with shaking chills  · You have side or back pain that gets worse  Contact your healthcare provider if:   · You have a fever  · You do not feel better after 2 days of taking antibiotics  · You are vomiting  · You have questions or concerns about your condition or care  Treatment for a UTI  may include medicines to treat a bacterial infection  You may also need medicines to decrease pain and burning, or decrease the urge to urinate often  Prevent a UTI:   · Empty your bladder often  Urinate and empty your bladder as soon as you feel the need  Do not hold your urine for long periods of time  · Wipe from front to back after you urinate or have a bowel movement  This will help prevent germs from getting into your urinary tract through your urethra  · Drink liquids as directed  Ask how much liquid to drink each day and which liquids are best for you  You may need to drink more liquids than usual to help flush out the bacteria  Do not drink alcohol, caffeine, or citrus juices  These can irritate your bladder and increase your symptoms  Your healthcare provider may recommend cranberry juice to help prevent a UTI  · Urinate after you have sex  This can help flush out bacteria passed during sex  · Do not douche or use feminine deodorants  These can change the chemical balance in your vagina  · Change sanitary pads or tampons often  This will help prevent germs from getting into your urinary tract  · Do pelvic muscle exercises often  Pelvic muscle exercises may help you start and stop urinating  Strong pelvic muscles may help you empty your bladder easier  Squeeze these muscles tightly for 5 seconds like you are trying to hold back urine  Then relax for 5 seconds  Gradually work up to squeezing for 10 seconds  Do 3 sets of 15 repetitions a day, or as directed  Follow up with your healthcare provider as directed:  Write down your questions so you remember to ask them during your visits  © 2017 [employer] Information is for End User's use only and may not be sold, redistributed or otherwise used for commercial purposes  All illustrations and images included in CareNotes® are the copyrighted property of [employer]  or Oni Dejesus  The above information is an  only  It is not intended as medical advice for individual conditions or treatments  Talk to your doctor, nurse or pharmacist before following any medical regimen to see if it is safe and effective for you

## 2018-09-30 NOTE — ED PROVIDER NOTES
History  Chief Complaint   Patient presents with    Back Pain     low  back pain radiating to low abdomen starting  this afternoon     Patient is a 47-year-old female presents for evaluation back pain and abdominal pain  Patient has a history of multiple kidney stones and the back pain feels similar to kidney stone episodes in the past   Additionally she complains urinary hesitancy and suprapubic abdominal pain  She feels chills but denies any fevers  Denies any falls or trauma  Denies chest pain or shortness of breath  Prior to Admission Medications   Prescriptions Last Dose Informant Patient Reported? Taking? albuterol (PROVENTIL HFA,VENTOLIN HFA) 90 mcg/act inhaler   Yes No   Sig: Inhale 2 puffs every 6 (six) hours as needed for wheezing  fluticasone-salmeterol (ADVAIR DISKUS) 100-50 mcg/dose   Yes No   Sig: Inhale   levothyroxine 150 mcg tablet   No No   Sig: Take 1 tablet (150 mcg total) by mouth daily      Facility-Administered Medications: None       Past Medical History:   Diagnosis Date    Abdominal pain     Altered bowel habits     constipation or diarrhea    Anxiety     Asthma     Back pain     Contact lens/glasses fitting     Disease of thyroid gland     hypothyroid    Heel pain, bilateral     left heel injected 17    Kidney stone        Past Surgical History:   Procedure Laterality Date    COLONOSCOPY N/A 10/2/2017    Procedure: COLONOSCOPY;  Surgeon: Raquel Brandt MD;  Location: [facility]; Service: Gastroenterology    ESOPHAGOGASTRODUODENOSCOPY N/A 10/2/2017    Procedure: ESOPHAGOGASTRODUODENOSCOPY (EGD); Surgeon: Raquel Brandt MD;  Location: [facility] GI LAB;   Service: Gastroenterology    INDUCED       KNEE ARTHROSCOPY Left     cartilage shaving       Family History   Problem Relation Age of Onset    Anxiety disorder Mother     Thyroid disease unspecified Mother         over or under active not sure    Hypothyroidism Sister     Asthma Sister I have reviewed and agree with the history as documented  Social History   Substance Use Topics    Smoking status: Current Every Day Smoker     Packs/day: 0 50     Years: 10 00     Types: Cigarettes    Smokeless tobacco: Never Used    Alcohol use Yes      Comment: occ        Review of Systems   Constitutional: Negative for activity change, appetite change and fatigue  HENT: Negative for nosebleeds, sneezing, sore throat, trouble swallowing and voice change  Eyes: Negative for photophobia, pain and visual disturbance  Respiratory: Negative for apnea, choking and stridor  Cardiovascular: Negative for palpitations and leg swelling  Gastrointestinal: Negative for anal bleeding and constipation  Endocrine: Negative for cold intolerance, heat intolerance, polydipsia and polyphagia  Genitourinary: Negative for decreased urine volume, enuresis, frequency, genital sores and urgency  Musculoskeletal: Negative for joint swelling and myalgias  Allergic/Immunologic: Negative for environmental allergies and food allergies  Neurological: Negative for tremors, seizures, speech difficulty and weakness  Hematological: Negative for adenopathy  Psychiatric/Behavioral: Negative for behavioral problems, decreased concentration, dysphoric mood and hallucinations  Physical Exam  Physical Exam   Constitutional: She is oriented to person, place, and time  She appears well-developed and well-nourished  No distress  HENT:   Head: Normocephalic and atraumatic  Right Ear: External ear normal    Left Ear: External ear normal    Nose: Nose normal    Mouth/Throat: Oropharynx is clear and moist    Eyes: Pupils are equal, round, and reactive to light  Conjunctivae and EOM are normal    Neck: Normal range of motion  Neck supple  Cardiovascular: Normal rate, regular rhythm and normal heart sounds  Exam reveals no gallop and no friction rub  No murmur heard    Pulmonary/Chest: Effort normal and breath sounds normal  No respiratory distress  She has no wheezes  Abdominal: Soft  Bowel sounds are normal  She exhibits no distension  There is tenderness in the suprapubic area  Neurological: She is alert and oriented to person, place, and time  Skin: Skin is warm and dry  She is not diaphoretic  Psychiatric: She has a normal mood and affect  Her behavior is normal    Vitals reviewed  Vital Signs  ED Triage Vitals [09/30/18 1519]   Temperature Pulse Respirations Blood Pressure SpO2   98 9 °F (37 2 °C) (!) 116 16 137/97 100 %      Temp Source Heart Rate Source Patient Position - Orthostatic VS BP Location FiO2 (%)   Tympanic Monitor Sitting Right arm --      Pain Score       7           Vitals:    09/30/18 1519   BP: 137/97   Pulse: (!) 116   Patient Position - Orthostatic VS: Sitting       Visual Acuity      ED Medications  Medications - No data to display    Diagnostic Studies  Results Reviewed     Procedure Component Value Units Date/Time    POCT pregnancy, urine [[id number]]  (Normal) Resulted:  09/30/18 1731    Lab Status:  Final result Updated:  09/30/18 1731     EXT PREG TEST UR (Ref: Negative) negative    Basic metabolic panel [[id number]] Collected:  09/30/18 1655    Lab Status:  Final result Specimen:  Blood from Arm, Right Updated:  09/30/18 1720     Sodium 138 mmol/L      Potassium 3 6 mmol/L      Chloride 104 mmol/L      CO2 26 mmol/L      ANION GAP 8 mmol/L      BUN 5 mg/dL      Creatinine 0 71 mg/dL      Glucose 82 mg/dL      Calcium 9 0 mg/dL      eGFR 119 ml/min/1 73sq m     Narrative:         National Kidney Disease Education Program recommendations are as follows:  GFR calculation is accurate only with a steady state creatinine  Chronic Kidney disease less than 60 ml/min/1 73 sq  meters  Kidney failure less than 15 ml/min/1 73 sq  meters      Urine Microscopic [[id number]]  (Abnormal) Collected:  09/30/18 1657    Lab Status:  Final result Specimen:  Urine from Urine, Clean Catch Updated: 09/30/18 1715     RBC, UA 0-1 (A) /hpf      WBC, UA None Seen /hpf      Epithelial Cells Moderate (A) /hpf      Bacteria, UA None Seen /hpf     UA w Reflex to Microscopic w Reflex to Culture [[id number]]  (Abnormal) Collected:  09/30/18 1657    Lab Status:  Final result Specimen:  Urine from Urine, Clean Catch Updated:  09/30/18 1709     Color, UA Yellow     Clarity, UA Clear     Specific Gravity, UA 1 025     pH, UA 6 5     Leukocytes, UA Negative     Nitrite, UA Negative     Protein, UA Negative mg/dl      Glucose, UA Negative mg/dl      Ketones, UA Negative mg/dl      Urobilinogen, UA 0 2 E U /dl      Bilirubin, UA Negative     Blood, UA Small (A)    CBC and differential [[id number]]  (Abnormal) Collected:  09/30/18 1655    Lab Status:  Final result Specimen:  Blood from Arm, Right Updated:  09/30/18 1709     WBC 18 14 (H) Thousand/uL      RBC 5 11 Million/uL      Hemoglobin 15 5 (H) g/dL      Hematocrit 46 8 (H) %      MCV 92 fL      MCH 30 3 pg      MCHC 33 1 g/dL      RDW 12 4 %      MPV 10 8 fL      Platelets 220 Thousands/uL      nRBC 0 /100 WBCs      Neutrophils Relative 61 %      Immat GRANS % 1 %      Lymphocytes Relative 30 %      Monocytes Relative 6 %      Eosinophils Relative 1 %      Basophils Relative 1 %      Neutrophils Absolute 11 33 (H) Thousands/µL      Immature Grans Absolute 0 09 Thousand/uL      Lymphocytes Absolute 5 40 (H) Thousands/µL      Monocytes Absolute 1 00 Thousand/µL      Eosinophils Absolute 0 23 Thousand/µL      Basophils Absolute 0 09 Thousands/µL                  CT renal stone study abdomen pelvis wo contrast   Final Result by Dawit Chopra MD (09/30 1818)         1  There is a tiny nonobstructing right renal calculus  There are no left renal calculi  There is no evidence of obstructive uropathy               Workstation performed: [id number]                    Procedures  Procedures       Phone Contacts  ED Phone Contact    ED Course MDM  CritCare Time    Disposition  Final diagnoses:   UTI (urinary tract infection)     Time reflects when diagnosis was documented in both MDM as applicable and the Disposition within this note     Time User Action Codes Description Comment    9/30/2018  6:42 PM Megan Loyola Add [N39 0] UTI (urinary tract infection)       ED Disposition     ED Disposition Condition Comment    [facility]  Condition at discharge: Stable        Follow-up Information     Follow up With Specialties Details Why [facility] [facility],  Family Medicine Schedule an appointment as soon as possible for a visit  [address] [place] [address]  [phone]            Patient's Medications   Discharge Prescriptions    CEPHALEXIN (KEFLEX) 500 MG CAPSULE    Take 1 capsule (500 mg total) by mouth every 12 (twelve) hours for 7 days       Start Date: 9/30/2018 End Date: 10/7/2018       Order Dose: 500 mg       Quantity: 14 capsule    Refills: 0    CEPHALEXIN (KEFLEX) 500 MG CAPSULE    Take 1 capsule (500 mg total) by mouth every 12 (twelve) hours for 7 days       Start Date: 9/30/2018 End Date: 10/7/2018       Order Dose: 500 mg       Quantity: 14 capsule    Refills: 0     No discharge procedures on file      ED Provider  Electronically Signed by           Rodo Fournier PA-C  09/30/18 6016

## 2018-10-01 NOTE — ED ATTENDING ATTESTATION
Checo Guillen MD, saw and evaluated the patient  I have discussed the patient with the resident/non-physician practitioner and agree with the resident's/non-physician practitioner's findings, Plan of Care, and MDM as documented in the resident's/non-physician practitioner's note, except where noted  All available labs and Radiology studies were reviewed  At this point I agree with the current assessment done in the Emergency Department    I have conducted an independent evaluation of this patient a history and physical is as follows:      Critical Care Time  CritCare Time    Procedures

## 2018-10-31 ENCOUNTER — TELEPHONE (OUTPATIENT)
Dept: FAMILY MEDICINE CLINIC | Facility: CLINIC | Age: 23
End: 2018-10-31

## 2018-11-06 ENCOUNTER — OFFICE VISIT (OUTPATIENT)
Dept: FAMILY MEDICINE CLINIC | Facility: CLINIC | Age: 23
End: 2018-11-06
Payer: COMMERCIAL

## 2018-11-06 VITALS
DIASTOLIC BLOOD PRESSURE: 86 MMHG | HEIGHT: 64 IN | RESPIRATION RATE: 18 BRPM | HEART RATE: 86 BPM | TEMPERATURE: 98 F | WEIGHT: 200 LBS | BODY MASS INDEX: 34.15 KG/M2 | SYSTOLIC BLOOD PRESSURE: 130 MMHG

## 2018-11-06 DIAGNOSIS — R10.30 LOWER ABDOMINAL PAIN: Primary | ICD-10-CM

## 2018-11-06 DIAGNOSIS — G89.29 CHRONIC RIGHT-SIDED LOW BACK PAIN WITHOUT SCIATICA: ICD-10-CM

## 2018-11-06 DIAGNOSIS — K25.9 GASTRIC ULCER, UNSPECIFIED CHRONICITY, UNSPECIFIED WHETHER GASTRIC ULCER HEMORRHAGE OR PERFORATION PRESENT: ICD-10-CM

## 2018-11-06 DIAGNOSIS — M54.50 CHRONIC RIGHT-SIDED LOW BACK PAIN WITHOUT SCIATICA: ICD-10-CM

## 2018-11-06 DIAGNOSIS — K29.70 GASTRITIS, PRESENCE OF BLEEDING UNSPECIFIED, UNSPECIFIED CHRONICITY, UNSPECIFIED GASTRITIS TYPE: ICD-10-CM

## 2018-11-06 LAB
SL AMB  POCT GLUCOSE, UA: NORMAL
SL AMB LEUKOCYTE ESTERASE,UA: NORMAL
SL AMB POCT BILIRUBIN,UA: NORMAL
SL AMB POCT BLOOD,UA: NORMAL
SL AMB POCT CLARITY,UA: CLEAR
SL AMB POCT COLOR,UA: YELLOW
SL AMB POCT KETONES,UA: NORMAL
SL AMB POCT NITRITE,UA: NORMAL
SL AMB POCT PH,UA: 7
SL AMB POCT SPECIFIC GRAVITY,UA: 1
SL AMB POCT URINE PROTEIN: NORMAL
SL AMB POCT UROBILINOGEN: NORMAL

## 2018-11-06 PROCEDURE — 3008F BODY MASS INDEX DOCD: CPT | Performed by: NURSE PRACTITIONER

## 2018-11-06 PROCEDURE — 81003 URINALYSIS AUTO W/O SCOPE: CPT | Performed by: NURSE PRACTITIONER

## 2018-11-06 PROCEDURE — 99214 OFFICE O/P EST MOD 30 MIN: CPT | Performed by: NURSE PRACTITIONER

## 2018-11-06 RX ORDER — PREDNISONE 10 MG/1
TABLET ORAL
Qty: 20 TABLET | Refills: 0 | Status: SHIPPED | OUTPATIENT
Start: 2018-11-06 | End: 2018-11-16

## 2018-11-06 RX ORDER — PANTOPRAZOLE SODIUM 40 MG/1
40 TABLET, DELAYED RELEASE ORAL DAILY
Qty: 30 TABLET | Refills: 0 | Status: SHIPPED | OUTPATIENT
Start: 2018-11-06 | End: 2019-04-22 | Stop reason: SDUPTHER

## 2018-11-06 NOTE — PROGRESS NOTES
Subjective:      Patient ID: Florencia Esposito is a 20 y o  female  Chief Complaint   Patient presents with    Follow-up     UTI  [address] 09/30/18  never improved even after the medication  rmklpn       HPI  Patient was seen in ER on 9/30 for UTI and was treated with keflex  Stated that having diffuse abdominal pain and more on lower abdominal area from year on and off  Intermittent nausea and vomiting from year  Seen GI last year and had EGD with colonoscopy done  Patient was advised to start PPI but never did  Denies any vaginal bleeding and vaginal discharge  Patient also having right sided lower back pain from a year and then started with bilateral foot pain and went to podatrist and was treated with NSAIDs and steroid injections in foot but no relief with the pain and stated that she might have pinch nerve in back which is causing foot pain  Denies any pain radiation in the legs  The following portions of the patient's history were reviewed and updated as appropriate: allergies, current medications, past family history, past medical history, past social history, past surgical history and problem list       Review of Systems   Constitutional: Negative  HENT: Negative  Respiratory: Negative  Cardiovascular: Negative  Gastrointestinal: Positive for abdominal pain, nausea and vomiting  Negative for anal bleeding, blood in stool, constipation, diarrhea and rectal pain  Genitourinary: Negative  Musculoskeletal: Positive for back pain  As noted in HPI   Skin: Negative  Neurological: Negative  Psychiatric/Behavioral: Negative  Objective:    History   Smoking Status    Current Every Day Smoker    Packs/day: 0 50    Years: 10 00    Types: Cigarettes   Smokeless Tobacco    Never Used       Allergies:    Allergies   Allergen Reactions    Ciprofloxacin Other (See Comments)     Skin felt like it was "on fire"       Vitals:  /86   Pulse 86 Temp 98 °F (36 7 °C)   Resp 18   Ht 5' 4" (1 626 m)   Wt 90 7 kg (200 lb)   LMP 10/16/2018 (Approximate)   BMI 34 33 kg/m²     Current Outpatient Prescriptions   Medication Sig Dispense Refill    albuterol (PROVENTIL HFA,VENTOLIN HFA) 90 mcg/act inhaler Inhale 2 puffs every 6 (six) hours as needed for wheezing   fluticasone-salmeterol (ADVAIR DISKUS) 100-50 mcg/dose Inhale      levothyroxine 150 mcg tablet Take 1 tablet (150 mcg total) by mouth daily 30 tablet 5    levothyroxine 75 mcg tablet Take 75 mcg by mouth      pantoprazole (PROTONIX) 40 mg tablet Take 1 tablet (40 mg total) by mouth daily 30 tablet 0    predniSONE 10 mg tablet 4 tabs x 2 days, 3 tabs x 2 days, 2 tabs x 2 days, 1 tab x 2 days 20 tablet 0     No current facility-administered medications for this visit  Physical Exam   Constitutional: She is oriented to person, place, and time  She appears well-developed and well-nourished  Cardiovascular: Normal rate, regular rhythm and normal heart sounds  Pulmonary/Chest: Effort normal and breath sounds normal    Abdominal: Soft  Normal appearance and bowel sounds are normal  There is no hepatosplenomegaly  There is tenderness in the periumbilical area  There is no rebound and no CVA tenderness  Hernia confirmed negative in the right inguinal area and confirmed negative in the left inguinal area  Musculoskeletal:        Lumbar back: She exhibits tenderness (tender on right lumbar paraspinal musculature without any swelling)  She exhibits no swelling, no edema and no laceration  Lymphadenopathy:        Right cervical: No superficial cervical and no posterior cervical adenopathy present  Left cervical: No superficial cervical and no posterior cervical adenopathy present  Right: No supraclavicular adenopathy present  Left: No supraclavicular adenopathy present  Neurological: She is alert and oriented to person, place, and time     Psychiatric: She has a normal mood and affect  Her behavior is normal  Judgment and thought content normal    Vitals reviewed  Recent Results (from the past 24 hour(s))   POCT urine dip auto non-scope    Collection Time: 11/06/18  3:11 PM   Result Value Ref Range     COLOR,UA yellow     CLARITY,UA clear     SPECIFIC GRAVITY,UA 1 005      PH,UA 7     LEUKOCYTE ESTERASE,UA neg     NITRITE,UA neg     GLUCOSE, UA normal     KETONES,UA neg     BILIRUBIN,UA neg     BLOOD,UA neg     POCT URINE PROTEIN neg     SL AMB POCT UROBILINOGEN normal            Assessment/Plan:         Diagnoses and all orders for this visit:    Lower abdominal pain  -     POCT urine dip auto non-scope  -     Urine culture  -     US pelvis complete w transvaginal; Future    Chronic right-sided low back pain without sciatica  Comments: Will start on low dose prednisone and will follow up with ortho  Orders:  -     predniSONE 10 mg tablet; 4 tabs x 2 days, 3 tabs x 2 days, 2 tabs x 2 days, 1 tab x 2 days    Gastritis, presence of bleeding unspecified, unspecified chronicity, unspecified gastritis type  Comments:  EGD and colnoscopy reports reviewed and showed antrum gastritis with linear gastric ulcers and will start PPI and follow up with GI  Orders:  -     Ambulatory referral to Gastroenterology; Future  -     pantoprazole (PROTONIX) 40 mg tablet; Take 1 tablet (40 mg total) by mouth daily    Gastric ulcer, unspecified chronicity, unspecified whether gastric ulcer hemorrhage or perforation present  Comments:  Diet discussed and advised  Orders:  -     Ambulatory referral to Gastroenterology; Future  -     pantoprazole (PROTONIX) 40 mg tablet; Take 1 tablet (40 mg total) by mouth daily    BMI 34 0-34 9,adult            Patient Instructions: Take prednisone with food in morning and do not take any NSAID's while taking prednisone  Supportive care discussed and advised  Follow up for no improvement and worsening of conditions    Patient advised and educated when to see immediate medical care  Follow up with ortho and GI      Return if symptoms worsen or fail to improve        AFSHIN Lauren

## 2018-11-06 NOTE — PATIENT INSTRUCTIONS
Take prednisone with food in morning and do not take any NSAID's while taking prednisone  Supportive care discussed and advised  Follow up for no improvement and worsening of conditions  Patient advised and educated when to see immediate medical care    Follow up with ortho and GI

## 2018-11-07 ENCOUNTER — TRANSCRIBE ORDERS (OUTPATIENT)
Dept: ADMINISTRATIVE | Facility: HOSPITAL | Age: 23
End: 2018-11-07

## 2018-11-07 DIAGNOSIS — F17.200 SMOKING: Primary | ICD-10-CM

## 2018-11-07 LAB
BACTERIA UR CULT: NORMAL
Lab: NO GROWTH

## 2019-01-14 ENCOUNTER — OFFICE VISIT (OUTPATIENT)
Dept: FAMILY MEDICINE CLINIC | Facility: CLINIC | Age: 24
End: 2019-01-14
Payer: COMMERCIAL

## 2019-01-14 ENCOUNTER — TELEPHONE (OUTPATIENT)
Dept: FAMILY MEDICINE CLINIC | Facility: CLINIC | Age: 24
End: 2019-01-14

## 2019-01-14 VITALS
SYSTOLIC BLOOD PRESSURE: 112 MMHG | HEIGHT: 64 IN | HEART RATE: 104 BPM | BODY MASS INDEX: 34.66 KG/M2 | DIASTOLIC BLOOD PRESSURE: 80 MMHG | WEIGHT: 203 LBS | TEMPERATURE: 102.7 F | RESPIRATION RATE: 16 BRPM

## 2019-01-14 DIAGNOSIS — J45.990 EXERCISE-INDUCED BRONCHOSPASM: ICD-10-CM

## 2019-01-14 DIAGNOSIS — J11.1 INFLUENZA: Primary | ICD-10-CM

## 2019-01-14 DIAGNOSIS — E03.9 HYPOTHYROIDISM, UNSPECIFIED TYPE: ICD-10-CM

## 2019-01-14 PROBLEM — N76.0 ACUTE VAGINITIS: Status: RESOLVED | Noted: 2018-02-26 | Resolved: 2019-01-14

## 2019-01-14 PROCEDURE — 99214 OFFICE O/P EST MOD 30 MIN: CPT | Performed by: NURSE PRACTITIONER

## 2019-01-14 PROCEDURE — 3008F BODY MASS INDEX DOCD: CPT | Performed by: NURSE PRACTITIONER

## 2019-01-14 RX ORDER — ALBUTEROL SULFATE 90 UG/1
2 AEROSOL, METERED RESPIRATORY (INHALATION) EVERY 4 HOURS PRN
Qty: 1 INHALER | Refills: 0 | Status: SHIPPED | OUTPATIENT
Start: 2019-01-14 | End: 2020-10-06

## 2019-01-14 RX ORDER — LEVOTHYROXINE SODIUM 0.15 MG/1
150 TABLET ORAL DAILY
Qty: 90 TABLET | Refills: 1 | Status: SHIPPED | OUTPATIENT
Start: 2019-01-14 | End: 2019-04-22 | Stop reason: SDUPTHER

## 2019-01-14 NOTE — PROGRESS NOTES
Assessment/Plan:    Reviewed supportive and symptomatic care  Push fluids and rest   Mucinex OTC, alternate Tylenol and Motrin, saline nasal rinses  RTO prn or in 6 months for CPE  Problem List Items Addressed This Visit        Endocrine    Hypothyroidism     Labs reviewed and Levothyroxine renewed  She will RTO in 6 months for CPE  Relevant Medications    levothyroxine 150 mcg tablet    Other Relevant Orders    TSH, 3rd generation    T4, free       Respiratory    Exercise-induced bronchospasm     Stable  Inhalers renewed  Relevant Medications    albuterol (PROVENTIL HFA,VENTOLIN HFA) 90 mcg/act inhaler    fluticasone-salmeterol (ADVAIR DISKUS) 100-50 mcg/dose inhaler      Other Visit Diagnoses     Influenza    -  Primary          There are no Patient Instructions on file for this visit  Return in about 6 months (around 7/14/2019)  Subjective:      Patient ID: Azeem Barroso is a 20 y o  female  Chief Complaint   Patient presents with    Cough     Started two days ago, body aches drhlpn       The night before last she developed sudden onset body aches, chills, tactile fever, sinus congestion, sneezing, and dry cough  She has not checked her temperature  She took Dayquil earlier this morning  She is using her inhaler and requesting refills on these  She did not have a flu shot this season  Also requesting refill on Levothyroxine  She ran out 2 days ago  Has not had labs since 2017  The following portions of the patient's history were reviewed and updated as appropriate: allergies, current medications, past family history, past medical history, past social history, past surgical history and problem list     Review of Systems   Constitutional: Positive for chills, fatigue and fever  HENT: Positive for congestion, sneezing and sore throat  Negative for ear pain, postnasal drip, rhinorrhea and sinus pressure  Respiratory: Positive for cough and wheezing   Negative for shortness of breath  Cardiovascular: Negative for chest pain  Gastrointestinal: Negative for abdominal pain, diarrhea, nausea and vomiting  Musculoskeletal: Positive for arthralgias  Skin: Negative for rash  Neurological: Positive for headaches  Current Outpatient Prescriptions   Medication Sig Dispense Refill    albuterol (PROVENTIL HFA,VENTOLIN HFA) 90 mcg/act inhaler Inhale 2 puffs every 4 (four) hours as needed for wheezing or shortness of breath 1 Inhaler 0    fluticasone-salmeterol (ADVAIR DISKUS) 100-50 mcg/dose inhaler Inhale 1 puff 2 (two) times a day 1 Inhaler 5    levothyroxine 150 mcg tablet Take 1 tablet (150 mcg total) by mouth daily 90 tablet 1    pantoprazole (PROTONIX) 40 mg tablet Take 1 tablet (40 mg total) by mouth daily 30 tablet 0     No current facility-administered medications for this visit  Objective:    /80   Pulse 104   Temp (!) 102 7 °F (39 3 °C)   Resp 16   Ht 5' 4" (1 626 m)   Wt 92 1 kg (203 lb)   BMI 34 84 kg/m²        Physical Exam   Constitutional: She appears well-developed and well-nourished  HENT:   Head: Normocephalic and atraumatic  Right Ear: Tympanic membrane, external ear and ear canal normal    Left Ear: Tympanic membrane, external ear and ear canal normal    Nose: Mucosal edema and rhinorrhea (clear) present  Mouth/Throat: Uvula is midline, oropharynx is clear and moist and mucous membranes are normal    Eyes: Conjunctivae are normal    Neck: Neck supple  No edema present  No thyromegaly present  Cardiovascular: Normal rate, regular rhythm, normal heart sounds and intact distal pulses  No murmur heard  Pulmonary/Chest: Effort normal and breath sounds normal    Abdominal: Bowel sounds are normal  She exhibits no distension  There is no splenomegaly or hepatomegaly  There is no tenderness  Lymphadenopathy:        Right cervical: No superficial cervical adenopathy present         Left cervical: No superficial cervical adenopathy present  Skin: Skin is warm, dry and intact  No rash noted  Psychiatric: She has a normal mood and affect  Nursing note and vitals reviewed               Yary Saavedra

## 2019-01-14 NOTE — LETTER
January 14, 2019     Patient: Azeem Barroso   YOB: 1995   Date of Visit: 1/14/2019       To Whom it May Concern:    Azeem Barroso is under my professional care  She was seen in my office on 1/14/2019  Please excuse from work 1/13/19-1/18/19  If you have any questions or concerns, please don't hesitate to call           Sincerely,          Artice AFSHIN Ledbetter        CC: No Recipients

## 2019-01-22 ENCOUNTER — HOSPITAL ENCOUNTER (EMERGENCY)
Facility: HOSPITAL | Age: 24
Discharge: HOME | End: 2019-01-22
Attending: EMERGENCY MEDICINE | Admitting: EMERGENCY MEDICINE
Payer: COMMERCIAL

## 2019-01-22 VITALS
HEART RATE: 92 BPM | TEMPERATURE: 98.2 F | OXYGEN SATURATION: 100 % | SYSTOLIC BLOOD PRESSURE: 138 MMHG | RESPIRATION RATE: 16 BRPM | WEIGHT: 193 LBS | HEIGHT: 64 IN | BODY MASS INDEX: 32.95 KG/M2 | DIASTOLIC BLOOD PRESSURE: 80 MMHG

## 2019-01-22 DIAGNOSIS — J02.0 STREP PHARYNGITIS: Primary | ICD-10-CM

## 2019-01-22 LAB
EXT PREG TEST URINE: NEGATIVE
S PYO AG THROAT QL: POSITIVE

## 2019-01-22 PROCEDURE — 87430 STREP A AG IA: CPT | Performed by: PHYSICIAN ASSISTANT

## 2019-01-22 PROCEDURE — 81025 URINE PREGNANCY TEST: CPT | Performed by: EMERGENCY MEDICINE

## 2019-01-22 PROCEDURE — 99283 EMERGENCY DEPT VISIT LOW MDM: CPT

## 2019-01-22 RX ORDER — AMOXICILLIN 500 MG/1
500 TABLET, FILM COATED ORAL 2 TIMES DAILY
Qty: 20 TABLET | Refills: 0 | Status: SHIPPED | OUTPATIENT
Start: 2019-01-22 | End: 2019-02-01

## 2019-01-22 NOTE — ED PROVIDER NOTES
History  Chief Complaint   Patient presents with    Sore Throat     Pt c/o sore throat for 1 1/2 weeks  Had fever originally but not for a few days  Wants to be checked for strep     18-year-old female presenting today with a sore throat over the past week and half  Has sick contacts in the home diagnosed with strep  States that she has just also getting over the flu  Otherwise is feeling well without any other complaints at this time  Her sore throat is 4/10 that is nonradiating  Able to eat and drink without any difficulty  Denies nausea, vomiting, shortness of breath, facial swelling, difficulty swallowing, diarrhea, constipation, abdominal pain, headache, neck pain or stiffness  Prior to Admission Medications   Prescriptions Last Dose Informant Patient Reported? Taking?    albuterol (PROVENTIL HFA,VENTOLIN HFA) 90 mcg/act inhaler   No No   Sig: Inhale 2 puffs every 4 (four) hours as needed for wheezing or shortness of breath   fluticasone-salmeterol (ADVAIR DISKUS) 100-50 mcg/dose inhaler   No No   Sig: Inhale 1 puff 2 (two) times a day   levothyroxine 150 mcg tablet   No No   Sig: Take 1 tablet (150 mcg total) by mouth daily   pantoprazole (PROTONIX) 40 mg tablet   No No   Sig: Take 1 tablet (40 mg total) by mouth daily      Facility-Administered Medications: None       Past Medical History:   Diagnosis Date    Abdominal pain     Altered bowel habits     constipation or diarrhea    Anxiety     Asthma     Back pain     Cellulitis     Resolved 12/1/2014     Contact lens/glasses fitting     Cryptic tonsil     Last assessed 8/6/2014     Disease of thyroid gland     hypothyroid    Hand paresthesia     Last assessed 8/5/2016     Heel pain, bilateral     left heel injected 9/28/17    Kidney stone     Loss of appetite     Resolved 12/26/2017        Past Surgical History:   Procedure Laterality Date    COLONOSCOPY N/A 10/2/2017    Procedure: COLONOSCOPY;  Surgeon: Leni Swann MD; Location: [place] [address] GI LAB; Service: Gastroenterology    ESOPHAGOGASTRODUODENOSCOPY N/A 10/2/2017    Procedure: ESOPHAGOGASTRODUODENOSCOPY (EGD); Surgeon: Semaj Gordillo MD;  Location: [facility]; Service: Gastroenterology    INDUCED       KNEE ARTHROSCOPY Left     cartilage shaving       Family History   Problem Relation Age of Onset    Anxiety disorder Mother     Thyroid disease unspecified Mother         over or under active not sure    Hypothyroidism Sister     Asthma Sister     Cancer Family      I have reviewed and agree with the history as documented  Social History   Substance Use Topics    Smoking status: Current Every Day Smoker     Packs/day: 0 50     Years: 10 00     Types: Cigarettes    Smokeless tobacco: Never Used    Alcohol use Yes      Comment: occ        Review of Systems   Constitutional: Negative  Negative for appetite change, chills and fever  HENT: Positive for sore throat  Negative for congestion, dental problem, ear pain, facial swelling, mouth sores, postnasal drip, sinus pressure and trouble swallowing  Eyes: Negative  Respiratory: Negative  Negative for cough, chest tightness, shortness of breath and wheezing  Cardiovascular: Negative  Negative for chest pain  Gastrointestinal: Negative  Negative for abdominal distention, abdominal pain, blood in stool, constipation, diarrhea, nausea and vomiting  Genitourinary: Negative  Musculoskeletal: Negative  Negative for arthralgias, neck pain and neck stiffness  Skin: Negative  Negative for rash  Neurological: Negative  Negative for facial asymmetry and headaches  All other systems reviewed and are negative  Physical Exam  Physical Exam   Constitutional: She appears well-developed and well-nourished  No distress  HENT:   Head: Normocephalic and atraumatic     Right Ear: External ear normal    Left Ear: External ear normal    Nose: Nose normal    Mouth/Throat: Oropharynx is clear and moist  No oropharyngeal exudate  Mild amount of erythema noted  No swelling, exudate, or uvula deviation noted of mouth  No discoloration, asymmetries or swelling of the face  No ulcerations, fluctuance, induration or drainage noted  No petechiae noted on palate  Able to swallow without difficulty  Full ROM of jaw  Eyes: Pupils are equal, round, and reactive to light  Conjunctivae are normal  Right eye exhibits no discharge  Left eye exhibits no discharge  No scleral icterus  Neck: Normal range of motion  Neck supple  No tracheal deviation present  Cardiovascular: Normal rate, regular rhythm, normal heart sounds and intact distal pulses  Exam reveals no friction rub  No murmur heard  Pulmonary/Chest: Effort normal and breath sounds normal  No stridor  No respiratory distress  She has no wheezes  She has no rales  She exhibits no tenderness  SpO2 is 100%, within normal limits, resting comfortably  No cyanosis or pallor  Abdominal: Soft  Bowel sounds are normal  She exhibits no distension  There is no tenderness  There is no rebound and no guarding  Lymphadenopathy:     She has cervical adenopathy  Skin: Skin is warm and dry  Capillary refill takes less than 2 seconds  No rash noted  She is not diaphoretic  No erythema  No pallor  No sandpaper rash noted  No other rashes noted  Good coloration of skin  Nursing note and vitals reviewed        Vital Signs  ED Triage Vitals   Temperature Pulse Respirations Blood Pressure SpO2   01/22/19 1140 01/22/19 1136 01/22/19 1136 01/22/19 1136 01/22/19 1136   98 2 °F (36 8 °C) 92 16 138/80 100 %      Temp src Heart Rate Source Patient Position - Orthostatic VS BP Location FiO2 (%)   -- -- -- -- --             Pain Score       01/22/19 1136       3           Vitals:    01/22/19 1136   BP: 138/80   Pulse: 92       Visual Acuity      ED Medications  Medications - No data to display    Diagnostic Studies  Results Reviewed     Procedure Component Value Units Date/Time    Rapid Strep A Screen Throat with Reflex to Culture, Pediatrics and Compromised Adults [[id number]]  (Abnormal) Collected:  01/22/19 1153    Lab Status:  Final result Specimen:  Throat from Throat Updated:  01/22/19 1214     Rapid Strep A Screen Positive (A)    POCT pregnancy, urine [[id number]]  (Normal) Resulted:  01/22/19 1142    Lab Status:  Final result Updated:  01/22/19 1143     EXT PREG TEST UR (Ref: Negative) negative                 No orders to display              Procedures  Procedures       Phone Contacts  ED Phone Contact    ED Course                               MDM  Number of Diagnoses or Management Options  Strep pharyngitis:   Diagnosis management comments: Will treat for strep pharyngitis  Patient is informed to return to the emergency department for worsening of symptoms and was given proper education regarding their diagnosis and symptoms  Otherwise the patient is informed to follow up with their primary care doctor for re-evaluation  The patient verbalizes understanding and agrees with above assessment and plan  All questions were answered  Please Note: Fluency Direct voice recognition software may have been used in the creation of this document  Wrong words or sound a like substitutions may have occurred due to the inherent limitations of the voice software             Amount and/or Complexity of Data Reviewed  Clinical lab tests: ordered and reviewed  Review and summarize past medical records: yes  Independent visualization of images, tracings, or specimens: yes      CritCare Time    Disposition  Final diagnoses:   Strep pharyngitis     Time reflects when diagnosis was documented in both MDM as applicable and the Disposition within this note     Time User Action Codes Description Comment    1/22/2019 12:24 PM Jonna Maria Add [J02 0] Strep pharyngitis       ED Disposition     ED Disposition Condition Comment    [facility]self care  Condition at discharge: Good        Follow-up Information     Follow up With Specialties Details Why [address] Emergency Department Emergency Medicine Go to If symptoms worsen [address] [facility]  [phone] [facility], [place], [place], [address] [facility] [place],  Family Medicine Schedule an appointment as soon as possible for a visit As needed [address]  [phone]             Discharge Medication List as of 1/22/2019 12:24 PM      START taking these medications    Details   amoxicillin (AMOXIL) 500 MG tablet Take 1 tablet (500 mg total) by mouth 2 (two) times a day for 10 days, Starting Tue 1/22/2019, Until Fri 2/1/2019, Print         CONTINUE these medications which have NOT CHANGED    Details   albuterol (PROVENTIL HFA,VENTOLIN HFA) 90 mcg/act inhaler Inhale 2 puffs every 4 (four) hours as needed for wheezing or shortness of breath, Starting Mon 1/14/2019, Normal      fluticasone-salmeterol (ADVAIR DISKUS) 100-50 mcg/dose inhaler Inhale 1 puff 2 (two) times a day, Starting Mon 1/14/2019, Normal      levothyroxine 150 mcg tablet Take 1 tablet (150 mcg total) by mouth daily, Starting Mon 1/14/2019, Normal      pantoprazole (PROTONIX) 40 mg tablet Take 1 tablet (40 mg total) by mouth daily, Starting Tue 11/6/2018, Normal           No discharge procedures on file      ED Provider  Electronically Signed by           Bryson Fortune PA-C  01/22/19 7568

## 2019-01-22 NOTE — DISCHARGE INSTRUCTIONS
Strep Throat, Ambulatory Care   GENERAL INFORMATION:   Strep throat  is a throat infection caused by bacteria  It is easily spread from person to person  Common symptoms include the following:   · Sore, red, and swollen throat    · Fever and headache     · Upset stomach, abdominal pain, or vomiting    · White or yellow patches or blisters in the back of your throat    · Tender, swollen lumps on the sides of your neck or jaw    · Throat pain when you swallow  Seek immediate care for the following symptoms:   · Throat pain that makes it too painful to drink fluids    · Drooling because you cannot swallow your spit    · Not able to open your mouth all the way or your voice is muffled    · Trouble breathing because your throat is swollen    · New symptoms like a bad headache, stiff neck, chest pain, or vomiting    · Blood in your urine and swollen face, feet, or hands  Treatment for strep throat:  You will need antibiotic medicine to treat your strep throat  Take your antibiotics until they are gone, even if you feel better  Do this unless your caregiver says it is okay to stop your antibiotics  You may return to work or school 24 hours after you start antibiotics  Manage strep throat:   · Do not smoke  If you smoke, it is never too late to quit  Smoking may make your symptoms worse  Ask for information if you need help quitting  · Drink juice, milk shakes, or soup  if your throat is too sore to eat solid food  Drinking liquids can also help prevent dehydration  · Gargle with salt water  Mix ¼ teaspoon salt in a glass of warm water and gargle  This may help reduce swelling in your throat  · Use lozenges, ice, soft foods, or popsicles  to soothe your throat    Prevent the spread of strep throat:   · Do not share food or drinks    · Wash your hands often    · Replace your toothbrush after you have taken antibiotics for 24 hours  Follow up with your healthcare provider as directed:  Write down your questions so you remember to ask them during your visits  CARE AGREEMENT:   You have the right to help plan your care  Learn about your health condition and how it may be treated  Discuss treatment options with your caregivers to decide what care you want to receive  You always have the right to refuse treatment  The above information is an  only  It is not intended as medical advice for individual conditions or treatments  Talk to your doctor, nurse or pharmacist before following any medical regimen to see if it is safe and effective for you  © 2014 [address] is for End User's use only and may not be sold, redistributed or otherwise used for commercial purposes  All illustrations and images included in CareNotes® are the copyrighted property of [employer]  or Oni Dejesus

## 2019-01-23 ENCOUNTER — VBI (OUTPATIENT)
Dept: FAMILY MEDICINE CLINIC | Facility: CLINIC | Age: 24
End: 2019-01-23

## 2019-01-23 NOTE — TELEPHONE ENCOUNTER
ED Visit Information     Ed visit date: 1/22/19  Diagnosis Description: sore throat, strep pharyngitis  In Network? Y  Discharge status:  Discharged  Discharged with meds ? Y  Number of ED visits to date: 2  ED Severity: N/A    Outreach Information    Outreach successful: Y  Date letter mailed:1/24/19  Date Finalized: 1/24/19    Care Coordination    Transportation issues ?      [employer] Outreach    Emergent necessity warranted by diagnosis:  No  [facility] PCP:  Yes  Transportation:    Called PCP first?:    Feels able to call PCP for urgent problems ?:    Understands what emergencies can be handled by PCP ?:    Ever any problems getting appointment with PCP for minor emergency/urgency problems?:   Practice Contacted Patient ?:    Pt had ED follow up with pcp/staff ?:  No    Seen for follow-up out of network ?:   Urgent care Education?:  Yes in letter

## 2019-04-22 ENCOUNTER — OFFICE VISIT (OUTPATIENT)
Dept: FAMILY MEDICINE CLINIC | Facility: CLINIC | Age: 24
End: 2019-04-22
Payer: COMMERCIAL

## 2019-04-22 VITALS
HEART RATE: 92 BPM | SYSTOLIC BLOOD PRESSURE: 126 MMHG | HEIGHT: 64 IN | DIASTOLIC BLOOD PRESSURE: 90 MMHG | RESPIRATION RATE: 16 BRPM | TEMPERATURE: 97.4 F | WEIGHT: 196 LBS | BODY MASS INDEX: 33.46 KG/M2

## 2019-04-22 DIAGNOSIS — K25.9 GASTRIC ULCER, UNSPECIFIED CHRONICITY, UNSPECIFIED WHETHER GASTRIC ULCER HEMORRHAGE OR PERFORATION PRESENT: ICD-10-CM

## 2019-04-22 DIAGNOSIS — R10.13 EPIGASTRIC PAIN: Primary | ICD-10-CM

## 2019-04-22 DIAGNOSIS — Z72.0 TOBACCO USE: ICD-10-CM

## 2019-04-22 DIAGNOSIS — Z13.83 SCREENING FOR CARDIOVASCULAR, RESPIRATORY, AND GENITOURINARY DISEASES: ICD-10-CM

## 2019-04-22 DIAGNOSIS — E03.9 HYPOTHYROIDISM, UNSPECIFIED TYPE: ICD-10-CM

## 2019-04-22 DIAGNOSIS — R73.01 IFG (IMPAIRED FASTING GLUCOSE): ICD-10-CM

## 2019-04-22 DIAGNOSIS — Z13.6 SCREENING FOR CARDIOVASCULAR, RESPIRATORY, AND GENITOURINARY DISEASES: ICD-10-CM

## 2019-04-22 DIAGNOSIS — Z13.1 SCREENING FOR DIABETES MELLITUS (DM): ICD-10-CM

## 2019-04-22 DIAGNOSIS — Z13.89 SCREENING FOR CARDIOVASCULAR, RESPIRATORY, AND GENITOURINARY DISEASES: ICD-10-CM

## 2019-04-22 DIAGNOSIS — K29.70 GASTRITIS, PRESENCE OF BLEEDING UNSPECIFIED, UNSPECIFIED CHRONICITY, UNSPECIFIED GASTRITIS TYPE: ICD-10-CM

## 2019-04-22 PROCEDURE — 3008F BODY MASS INDEX DOCD: CPT | Performed by: FAMILY MEDICINE

## 2019-04-22 PROCEDURE — 99214 OFFICE O/P EST MOD 30 MIN: CPT | Performed by: FAMILY MEDICINE

## 2019-04-22 RX ORDER — PANTOPRAZOLE SODIUM 40 MG/1
40 TABLET, DELAYED RELEASE ORAL DAILY
Qty: 90 TABLET | Refills: 1 | Status: SHIPPED | OUTPATIENT
Start: 2019-04-22 | End: 2020-10-06

## 2019-04-22 RX ORDER — LEVOTHYROXINE SODIUM 0.15 MG/1
150 TABLET ORAL DAILY
Qty: 90 TABLET | Refills: 1 | Status: SHIPPED | OUTPATIENT
Start: 2019-04-22 | End: 2019-07-17

## 2019-05-25 ENCOUNTER — HOSPITAL ENCOUNTER (EMERGENCY)
Facility: HOSPITAL | Age: 24
Discharge: HOME | End: 2019-05-25
Attending: EMERGENCY MEDICINE | Admitting: EMERGENCY MEDICINE
Payer: COMMERCIAL

## 2019-05-25 ENCOUNTER — APPOINTMENT (EMERGENCY)
Dept: RADIOLOGY | Facility: HOSPITAL | Age: 24
End: 2019-05-25
Payer: COMMERCIAL

## 2019-05-25 VITALS
SYSTOLIC BLOOD PRESSURE: 102 MMHG | WEIGHT: 189 LBS | DIASTOLIC BLOOD PRESSURE: 76 MMHG | BODY MASS INDEX: 32.27 KG/M2 | OXYGEN SATURATION: 99 % | HEIGHT: 64 IN | HEART RATE: 77 BPM | RESPIRATION RATE: 18 BRPM | TEMPERATURE: 98.2 F

## 2019-05-25 DIAGNOSIS — R10.9 ABDOMINAL PAIN: Primary | ICD-10-CM

## 2019-05-25 LAB
ALBUMIN SERPL BCP-MCNC: 3.9 G/DL (ref 3.5–5)
ALP SERPL-CCNC: 66 U/L (ref 46–116)
ALT SERPL W P-5'-P-CCNC: 24 U/L (ref 12–78)
ANION GAP SERPL CALCULATED.3IONS-SCNC: 10 MMOL/L (ref 4–13)
AST SERPL W P-5'-P-CCNC: 14 U/L (ref 5–45)
BACTERIA UR QL AUTO: ABNORMAL /HPF
BASOPHILS # BLD AUTO: 0.06 THOUSANDS/ΜL (ref 0–0.1)
BASOPHILS NFR BLD AUTO: 1 % (ref 0–1)
BILIRUB SERPL-MCNC: 0.4 MG/DL (ref 0.2–1)
BILIRUB UR QL STRIP: NEGATIVE
BUN SERPL-MCNC: 8 MG/DL (ref 5–25)
CALCIUM SERPL-MCNC: 9.2 MG/DL (ref 8.3–10.1)
CHLORIDE SERPL-SCNC: 102 MMOL/L (ref 100–108)
CLARITY UR: CLEAR
CO2 SERPL-SCNC: 27 MMOL/L (ref 21–32)
COLOR UR: ABNORMAL
CREAT SERPL-MCNC: 0.81 MG/DL (ref 0.6–1.3)
EOSINOPHIL # BLD AUTO: 0.24 THOUSAND/ΜL (ref 0–0.61)
EOSINOPHIL NFR BLD AUTO: 2 % (ref 0–6)
ERYTHROCYTE [DISTWIDTH] IN BLOOD BY AUTOMATED COUNT: 12.1 % (ref 11.6–15.1)
EXT PREG TEST URINE: NEGATIVE
GFR SERPL CREATININE-BSD FRML MDRD: 101 ML/MIN/1.73SQ M
GLUCOSE SERPL-MCNC: 84 MG/DL (ref 65–140)
GLUCOSE UR STRIP-MCNC: NEGATIVE MG/DL
HCT VFR BLD AUTO: 46.5 % (ref 34.8–46.1)
HGB BLD-MCNC: 15.8 G/DL (ref 11.5–15.4)
HGB UR QL STRIP.AUTO: ABNORMAL
IMM GRANULOCYTES # BLD AUTO: 0.04 THOUSAND/UL (ref 0–0.2)
IMM GRANULOCYTES NFR BLD AUTO: 0 % (ref 0–2)
KETONES UR STRIP-MCNC: NEGATIVE MG/DL
LEUKOCYTE ESTERASE UR QL STRIP: NEGATIVE
LIPASE SERPL-CCNC: 80 U/L (ref 73–393)
LYMPHOCYTES # BLD AUTO: 3.45 THOUSANDS/ΜL (ref 0.6–4.47)
LYMPHOCYTES NFR BLD AUTO: 31 % (ref 14–44)
MCH RBC QN AUTO: 31 PG (ref 26.8–34.3)
MCHC RBC AUTO-ENTMCNC: 34 G/DL (ref 31.4–37.4)
MCV RBC AUTO: 91 FL (ref 82–98)
MONOCYTES # BLD AUTO: 0.61 THOUSAND/ΜL (ref 0.17–1.22)
MONOCYTES NFR BLD AUTO: 6 % (ref 4–12)
NEUTROPHILS # BLD AUTO: 6.67 THOUSANDS/ΜL (ref 1.85–7.62)
NEUTS SEG NFR BLD AUTO: 60 % (ref 43–75)
NITRITE UR QL STRIP: NEGATIVE
NON-SQ EPI CELLS URNS QL MICRO: ABNORMAL /HPF
NRBC BLD AUTO-RTO: 0 /100 WBCS
PH UR STRIP.AUTO: 7.5 [PH]
PLATELET # BLD AUTO: 296 THOUSANDS/UL (ref 149–390)
PMV BLD AUTO: 11 FL (ref 8.9–12.7)
POTASSIUM SERPL-SCNC: 3.5 MMOL/L (ref 3.5–5.3)
PROT SERPL-MCNC: 7.9 G/DL (ref 6.4–8.2)
PROT UR STRIP-MCNC: NEGATIVE MG/DL
RBC # BLD AUTO: 5.1 MILLION/UL (ref 3.81–5.12)
RBC #/AREA URNS AUTO: ABNORMAL /HPF
SODIUM SERPL-SCNC: 139 MMOL/L (ref 136–145)
SP GR UR STRIP.AUTO: 1.01 (ref 1–1.03)
UROBILINOGEN UR QL STRIP.AUTO: 0.2 E.U./DL
WBC # BLD AUTO: 11.07 THOUSAND/UL (ref 4.31–10.16)
WBC #/AREA URNS AUTO: ABNORMAL /HPF

## 2019-05-25 PROCEDURE — 96361 HYDRATE IV INFUSION ADD-ON: CPT

## 2019-05-25 PROCEDURE — 96375 TX/PRO/DX INJ NEW DRUG ADDON: CPT

## 2019-05-25 PROCEDURE — 74177 CT ABD & PELVIS W/CONTRAST: CPT

## 2019-05-25 PROCEDURE — 80053 COMPREHEN METABOLIC PANEL: CPT | Performed by: PHYSICIAN ASSISTANT

## 2019-05-25 PROCEDURE — 96374 THER/PROPH/DIAG INJ IV PUSH: CPT

## 2019-05-25 PROCEDURE — 81025 URINE PREGNANCY TEST: CPT | Performed by: PHYSICIAN ASSISTANT

## 2019-05-25 PROCEDURE — 85025 COMPLETE CBC W/AUTO DIFF WBC: CPT | Performed by: PHYSICIAN ASSISTANT

## 2019-05-25 PROCEDURE — 83690 ASSAY OF LIPASE: CPT | Performed by: PHYSICIAN ASSISTANT

## 2019-05-25 PROCEDURE — 81001 URINALYSIS AUTO W/SCOPE: CPT | Performed by: PHYSICIAN ASSISTANT

## 2019-05-25 PROCEDURE — 36415 COLL VENOUS BLD VENIPUNCTURE: CPT | Performed by: PHYSICIAN ASSISTANT

## 2019-05-25 PROCEDURE — 99284 EMERGENCY DEPT VISIT MOD MDM: CPT

## 2019-05-25 PROCEDURE — C9113 INJ PANTOPRAZOLE SODIUM, VIA: HCPCS | Performed by: PHYSICIAN ASSISTANT

## 2019-05-25 RX ORDER — PANTOPRAZOLE SODIUM 40 MG/1
40 INJECTION, POWDER, FOR SOLUTION INTRAVENOUS ONCE
Status: COMPLETED | OUTPATIENT
Start: 2019-05-25 | End: 2019-05-25

## 2019-05-25 RX ORDER — MORPHINE SULFATE 4 MG/ML
4 INJECTION, SOLUTION INTRAMUSCULAR; INTRAVENOUS ONCE
Status: COMPLETED | OUTPATIENT
Start: 2019-05-25 | End: 2019-05-25

## 2019-05-25 RX ADMIN — SODIUM CHLORIDE 1000 ML: 0.9 INJECTION, SOLUTION INTRAVENOUS at 15:38

## 2019-05-25 RX ADMIN — PANTOPRAZOLE SODIUM 40 MG: 40 INJECTION, POWDER, FOR SOLUTION INTRAVENOUS at 15:37

## 2019-05-25 RX ADMIN — MORPHINE SULFATE 4 MG: 4 INJECTION INTRAVENOUS at 15:37

## 2019-05-25 RX ADMIN — IOHEXOL 100 ML: 350 INJECTION, SOLUTION INTRAVENOUS at 16:36

## 2019-05-28 ENCOUNTER — VBI (OUTPATIENT)
Dept: FAMILY MEDICINE CLINIC | Facility: CLINIC | Age: 24
End: 2019-05-28

## 2019-07-12 ENCOUNTER — HOSPITAL ENCOUNTER (EMERGENCY)
Facility: HOSPITAL | Age: 24
Discharge: HOME | End: 2019-07-12
Attending: EMERGENCY MEDICINE
Payer: COMMERCIAL

## 2019-07-12 VITALS
HEART RATE: 83 BPM | BODY MASS INDEX: 32.43 KG/M2 | RESPIRATION RATE: 18 BRPM | OXYGEN SATURATION: 100 % | TEMPERATURE: 97.5 F | SYSTOLIC BLOOD PRESSURE: 144 MMHG | DIASTOLIC BLOOD PRESSURE: 96 MMHG | WEIGHT: 183 LBS | HEIGHT: 63 IN

## 2019-07-12 DIAGNOSIS — T78.40XA ALLERGIC REACTION, INITIAL ENCOUNTER: Primary | ICD-10-CM

## 2019-07-12 PROCEDURE — 96374 THER/PROPH/DIAG INJ IV PUSH: CPT

## 2019-07-12 PROCEDURE — 99283 EMERGENCY DEPT VISIT LOW MDM: CPT

## 2019-07-12 PROCEDURE — 96375 TX/PRO/DX INJ NEW DRUG ADDON: CPT

## 2019-07-12 RX ORDER — METHYLPREDNISOLONE SODIUM SUCCINATE 125 MG/2ML
80 INJECTION, POWDER, LYOPHILIZED, FOR SOLUTION INTRAMUSCULAR; INTRAVENOUS ONCE
Status: COMPLETED | OUTPATIENT
Start: 2019-07-12 | End: 2019-07-12

## 2019-07-12 RX ORDER — DIPHENHYDRAMINE HYDROCHLORIDE 50 MG/ML
25 INJECTION INTRAMUSCULAR; INTRAVENOUS ONCE
Status: COMPLETED | OUTPATIENT
Start: 2019-07-12 | End: 2019-07-12

## 2019-07-12 RX ADMIN — METHYLPREDNISOLONE SODIUM SUCCINATE 80 MG: 125 INJECTION, POWDER, FOR SOLUTION INTRAMUSCULAR; INTRAVENOUS at 22:00

## 2019-07-12 RX ADMIN — DIPHENHYDRAMINE HYDROCHLORIDE 25 MG: 50 INJECTION, SOLUTION INTRAMUSCULAR; INTRAVENOUS at 22:00

## 2019-07-12 RX ADMIN — FAMOTIDINE 20 MG: 10 INJECTION, SOLUTION INTRAVENOUS at 22:00

## 2019-07-13 NOTE — ED NOTES
Patient states she is feeling much better after IV medications  Patient reports throat is no longer bothering her  Dr Haven Diaz made aware        Wilda Colindres RN  07/12/19 7440

## 2019-07-13 NOTE — ED PROVIDER NOTES
History  Chief Complaint   Patient presents with    Allergic Reaction     pt reports around 8pm this evening sudden rash to anterior neck + ithcing took 2 tabs benadryl at that time  reports painful swallowing, sts cannot take a deep breath hx of asthma speaking in full complete sentences resp unlabored lungs cta airway patent mild erythema noted to posterior pharynx  rash to neck improving      29 yo female c/o sudden onset red rash and itching on anterior neck, thinks it is from her fiance's beard product that she had just come into contact with  Was not eating anything  Started about 90 min ago  Did take 2 benadryl and rash is less red but still there  No sob but she feels like it hurts to swallow  Had been well prior to this  No recent cough, cold symptoms, fever, vomiting  History provided by:  Patient   used: No        Prior to Admission Medications   Prescriptions Last Dose Informant Patient Reported? Taking?    albuterol (PROVENTIL HFA,VENTOLIN HFA) 90 mcg/act inhaler 7/11/2019 at Unknown time  No Yes   Sig: Inhale 2 puffs every 4 (four) hours as needed for wheezing or shortness of breath   fluticasone-salmeterol (ADVAIR DISKUS) 100-50 mcg/dose inhaler 7/12/2019 at Unknown time  No Yes   Sig: Inhale 1 puff 2 (two) times a day   levothyroxine 150 mcg tablet 7/12/2019 at Unknown time  No Yes   Sig: Take 1 tablet (150 mcg total) by mouth daily   pantoprazole (PROTONIX) 40 mg tablet 7/12/2019 at Unknown time  No Yes   Sig: Take 1 tablet (40 mg total) by mouth daily      Facility-Administered Medications: None       Past Medical History:   Diagnosis Date    Abdominal pain     Altered bowel habits     constipation or diarrhea    Anxiety     Asthma     Back pain     Cellulitis     Resolved 12/1/2014     Contact lens/glasses fitting     Cryptic tonsil     Last assessed 8/6/2014     Disease of thyroid gland     hypothyroid    GERD (gastroesophageal reflux disease)     Hand paresthesia     Last assessed 2016     Heel pain, bilateral     left heel injected 17    Kidney stone     Loss of appetite     Resolved 2017        Past Surgical History:   Procedure Laterality Date    COLONOSCOPY N/A 10/2/2017    Procedure: COLONOSCOPY;  Surgeon: Sierra Lynne MD;  Location[facility] GI [facility]; Service: Gastroenterology    ESOPHAGOGASTRODUODENOSCOPY N/A 10/2/2017    Procedure: ESOPHAGOGASTRODUODENOSCOPY (EGD); Surgeon: Sierra Lynne MD;  Location: [facility] GI LAB; Service: Gastroenterology    INDUCED       KNEE ARTHROSCOPY Left     cartilage shaving       Family History   Problem Relation Age of Onset    Anxiety disorder Mother     Thyroid disease unspecified Mother         over or under active not sure    Hypothyroidism Sister     Asthma Sister     Cancer Family      I have reviewed and agree with the history as documented  Social History     Tobacco Use    Smoking status: Current Every Day Smoker     Packs/day: 0 50     Years: 10 00     Pack years: 5 00     Types: Cigarettes    Smokeless tobacco: Never Used   Substance Use Topics    Alcohol use: Yes     Comment: occ    Drug use: No        Review of Systems   Constitutional: Negative  Negative for fever  HENT: Positive for trouble swallowing  Eyes: Negative  Respiratory: Negative  Negative for cough and shortness of breath  Cardiovascular: Negative  Negative for chest pain  Gastrointestinal: Negative  Negative for abdominal pain, diarrhea, nausea and vomiting  Genitourinary: Negative  Negative for dysuria and flank pain  Musculoskeletal: Negative  Negative for back pain and myalgias  Skin: Positive for rash  Neurological: Negative  Negative for dizziness and headaches  Hematological: Does not bruise/bleed easily  Psychiatric/Behavioral: Negative  All other systems reviewed and are negative        Physical Exam  Physical Exam   Constitutional: She is oriented to person, place, and time  She appears well-developed and well-nourished  No distress  HENT:   Head: Normocephalic and atraumatic  Mouth/Throat: Oropharynx is clear and moist    Eyes: Pupils are equal, round, and reactive to light  Conjunctivae are normal    Neck: Normal range of motion  Neck supple  No tracheal deviation present  Cardiovascular: Normal rate, regular rhythm and normal heart sounds  No murmur heard  Pulmonary/Chest: Effort normal and breath sounds normal  No respiratory distress  Abdominal: Soft  Bowel sounds are normal  She exhibits no distension  There is no tenderness  Musculoskeletal: Normal range of motion  She exhibits no edema or deformity  Lymphadenopathy:     She has no cervical adenopathy  Neurological: She is alert and oriented to person, place, and time  No cranial nerve deficit  She exhibits normal muscle tone  Speech intact and handling saliva, no obvious difficulty swallowing   Skin: Skin is warm and dry  Rash noted  She is not diaphoretic  There is erythema  No pallor  Anterior neck   Psychiatric: She has a normal mood and affect  Her behavior is normal    Nursing note and vitals reviewed        Vital Signs  ED Triage Vitals [07/12/19 2119]   Temperature Pulse Respirations Blood Pressure SpO2   97 5 °F (36 4 °C) 83 18 144/96 100 %      Temp Source Heart Rate Source Patient Position - Orthostatic VS BP Location FiO2 (%)   Tympanic Monitor Sitting Right arm --      Pain Score       4           Vitals:    07/12/19 2119   BP: 144/96   Pulse: 83   Patient Position - Orthostatic VS: Sitting         Visual Acuity      ED Medications  Medications   diphenhydrAMINE (BENADRYL) injection 25 mg (25 mg Intravenous Given 7/12/19 2200)   methylPREDNISolone sodium succinate (Solu-MEDROL) injection 80 mg (80 mg Intravenous Given 7/12/19 2200)   famotidine (PEPCID) injection 20 mg (20 mg Intravenous Given 7/12/19 2200)       Diagnostic Studies  Results Reviewed     None                 No orders to display              Procedures  Procedures       ED Course                               MDM  Number of Diagnoses or Management Options  Allergic reaction, initial encounter:   Diagnosis management comments: 7699 - pt  Feels much better  Advised continue benadryl around the clock for 24 hours and then as needed  Avoid allergen  Follow up if any problems  Disposition  Final diagnoses: Allergic reaction, initial encounter     Time reflects when diagnosis was documented in both MDM as applicable and the Disposition within this note     Time User Action Codes Description Comment    9/84/1951 62:15 PM Herbie Martinez Add [B80 [id number]] Allergic reaction, initial encounter       ED Disposition     ED Disposition Condition Date/Time Comment    Discharge Stable Fri Jul 12, 2019 10:51 PM Adonis Matamoros discharge to home/self care  Follow-up Information     Follow up With Specialties Details Why [address], DO Family Medicine Schedule an appointment as soon as possible for a visit  As needed [address] [facility]  [phone]            Patient's Medications   Discharge Prescriptions    No medications on file     No discharge procedures on file      ED Provider  Electronically Signed by           Damien Banuelos MD  90/01/83 5526

## 2019-07-13 NOTE — DISCHARGE INSTRUCTIONS
Continue Benadryl around the clock for the next 24 hours and then as needed for rash or itching  Avoid the allergen  Follow up with your doctor if any problems

## 2019-07-15 ENCOUNTER — VBI (OUTPATIENT)
Dept: FAMILY MEDICINE CLINIC | Facility: CLINIC | Age: 24
End: 2019-07-15

## 2019-07-16 NOTE — TELEPHONE ENCOUNTER
Tiesha Woodward    ED Visit Information     Ed visit date: 7/12/19  Diagnosis Description: Allergic Reaction  In Network? Yes Nghiane Pilling  Discharge status: Home  Discharged with meds ? No  Number of ED visits to date: 4  ED Severity:N/A     Outreach Information    Outreach successful: Yes 2  Date letter mailed:7/16/19   Date [facility]    Follow up appointment with pcp: no no  Transportation issues ?  No    [employer] Outreach    7/15/19 9:08 AM LMOM  7/16/19 10:12 AM unable to reach pt by phone, will mail letter

## 2019-07-17 DIAGNOSIS — E03.9 HYPOTHYROIDISM, UNSPECIFIED TYPE: ICD-10-CM

## 2019-07-17 RX ORDER — LEVOTHYROXINE SODIUM 0.15 MG/1
TABLET ORAL
Qty: 90 TABLET | Refills: 1 | Status: SHIPPED | OUTPATIENT
Start: 2019-07-17 | End: 2021-08-16 | Stop reason: SDUPTHER

## 2020-10-06 ENCOUNTER — HOSPITAL ENCOUNTER (EMERGENCY)
Facility: HOSPITAL | Age: 25
Discharge: HOME | End: 2020-10-06
Attending: EMERGENCY MEDICINE
Payer: COMMERCIAL

## 2020-10-06 VITALS
SYSTOLIC BLOOD PRESSURE: 125 MMHG | RESPIRATION RATE: 18 BRPM | DIASTOLIC BLOOD PRESSURE: 75 MMHG | OXYGEN SATURATION: 100 % | TEMPERATURE: 97.6 F | HEART RATE: 85 BPM

## 2020-10-06 DIAGNOSIS — J06.9 URI (UPPER RESPIRATORY INFECTION): Primary | ICD-10-CM

## 2020-10-06 PROCEDURE — 99283 EMERGENCY DEPT VISIT LOW MDM: CPT

## 2020-10-06 PROCEDURE — 99284 EMERGENCY DEPT VISIT MOD MDM: CPT | Performed by: PHYSICIAN ASSISTANT

## 2020-10-06 RX ORDER — GUAIFENESIN 100 MG/5ML
200 SYRUP ORAL 3 TIMES DAILY PRN
Qty: 120 ML | Refills: 0 | Status: SHIPPED | OUTPATIENT
Start: 2020-10-06 | End: 2020-10-16

## 2020-10-06 RX ORDER — FLUTICASONE PROPIONATE 50 MCG
2 SPRAY, SUSPENSION (ML) NASAL 2 TIMES DAILY
Qty: 16 G | Refills: 0 | Status: SHIPPED | OUTPATIENT
Start: 2020-10-06 | End: 2021-07-14

## 2020-10-06 RX ORDER — ALBUTEROL SULFATE 90 UG/1
2 AEROSOL, METERED RESPIRATORY (INHALATION) EVERY 4 HOURS PRN
Qty: 1 INHALER | Refills: 0 | Status: SHIPPED | OUTPATIENT
Start: 2020-10-06 | End: 2020-11-05

## 2020-10-08 ENCOUNTER — VBI (OUTPATIENT)
Dept: FAMILY MEDICINE CLINIC | Facility: CLINIC | Age: 25
End: 2020-10-08

## 2021-02-24 ENCOUNTER — OFFICE VISIT (OUTPATIENT)
Dept: URGENT CARE | Facility: CLINIC | Age: 26
End: 2021-02-24
Payer: COMMERCIAL

## 2021-02-24 VITALS
SYSTOLIC BLOOD PRESSURE: 132 MMHG | DIASTOLIC BLOOD PRESSURE: 79 MMHG | HEART RATE: 99 BPM | WEIGHT: 228 LBS | RESPIRATION RATE: 18 BRPM | OXYGEN SATURATION: 98 % | HEIGHT: 64 IN | TEMPERATURE: 97.8 F | BODY MASS INDEX: 38.93 KG/M2

## 2021-02-24 DIAGNOSIS — R13.10 DYSPHAGIA, UNSPECIFIED TYPE: Primary | ICD-10-CM

## 2021-02-24 PROCEDURE — 99213 OFFICE O/P EST LOW 20 MIN: CPT | Performed by: PHYSICIAN ASSISTANT

## 2021-02-24 PROCEDURE — 3008F BODY MASS INDEX DOCD: CPT | Performed by: PHYSICIAN ASSISTANT

## 2021-02-24 NOTE — PROGRESS NOTES
[id number]    NAME: Gianfranco Arhcer is a 30 y o  female  : 1995    MRN: [id number]  DATE: 2021  TIME: 4:23 PM    Assessment and Plan   Dysphagia, unspecified type [R13 10]  1  Dysphagia, unspecified type       Patient Instructions   Rapid strep negative, will send out culture and will call if abnormal  rec f/u with PCP for thyroid imaging and blood work  Possible ref to GI for swallow study if persists    Follow up with PCP in 3-5 days  Proceed to  ER if symptoms worsen  Chief Complaint     Chief Complaint   Patient presents with    Sore Throat     feels her throat is closing and cannot swallow  if she stretches her neck its a bit better         History of Present Illness       Anat Jefferson  Is a 43-year-old female who presents to clinic complaining of throat  x1 day  She states that it began as a mild sore throat 1 week ago but this morning she woke up with a swelling sensation in her throat and also noticed difficulty swallowing  She states that when she extends her neck it feels like it improved her ability to swallow  She denies any fever, chills, respiratory distress, wheezing, stridor, nasal congestion, cough, shortness of breath, difficulty breathing, ear pain, nausea,  Or vomiting  She denies any new medications, foods, or products  Review of Systems   Review of Systems   Constitutional: Negative for chills and fever  HENT: Positive for sore throat and trouble swallowing  Negative for voice change  Respiratory: Negative for cough, choking, chest tightness, shortness of breath, wheezing and stridor        Current Medications       Current Outpatient Medications:     levothyroxine 150 mcg tablet, TAKE ONE TABLET BY MOUTH EVERY DAY (Patient taking differently: 137 mcg ), Disp: 90 tablet, Rfl: 1    fluticasone (FLONASE) 50 mcg/act nasal spray, 2 sprays into each nostril 2 (two) times a day for 3 days, Disp: 16 g, Rfl: 0    Prenatal Vit-Fe Fumarate-FA (PRENATAL PO), Take 1 tablet by mouth, Disp: , Rfl:     Current Allergies     Allergies as of 2021 - Reviewed 2021   Allergen Reaction Noted    Ciprofloxacin Other (See Comments) 2016            The following portions of the patient's history were reviewed and updated as appropriate: allergies, current medications, past family history, past medical history, past social history, past surgical history and problem list      Past Medical History:   Diagnosis Date    Abdominal pain     Altered bowel habits     constipation or diarrhea    Anxiety     Asthma     Back pain     Cellulitis     Resolved 2014     Contact lens/glasses fitting     Cryptic tonsil     Last assessed 2014     Disease of thyroid gland     hypothyroid    GERD (gastroesophageal reflux disease)     Hand paresthesia     Last assessed 2016     Heel pain, bilateral     left heel injected 17    Kidney stone     Loss of appetite     Resolved 2017        Past Surgical History:   Procedure Laterality Date    COLONOSCOPY N/A 10/2/2017    Procedure: COLONOSCOPY;  Surgeon: Sandie Samuel MD;  Location: [place] [address] GI LAB; Service: Gastroenterology    ESOPHAGOGASTRODUODENOSCOPY N/A 10/2/2017    Procedure: ESOPHAGOGASTRODUODENOSCOPY (EGD); Surgeon: Sandie Samuel MD;  Location: [facility] GI LAB; Service: Gastroenterology    INDUCED       KNEE ARTHROSCOPY Left     cartilage shaving       Family History   Problem Relation Age of Onset    Anxiety disorder Mother     Thyroid disease unspecified Mother         over or under active not sure    Hypothyroidism Sister     Asthma Sister     Cancer Family          Medications have been verified  Objective   /79   Pulse 99   Temp 97 8 °F (36 6 °C)   Resp 18   Ht 5' 4" (1 626 m)   Wt 103 kg (228 lb)   SpO2 98%   BMI 39 14 kg/m²   No LMP recorded  Patient is pregnant  Physical Exam     Physical Exam  Vitals signs and nursing note reviewed     Constitutional: General: She is not in acute distress  Appearance: She is well-developed  She is not ill-appearing  HENT:      Mouth/Throat:      Mouth: Mucous membranes are moist       Pharynx: Oropharynx is clear  Uvula midline  Posterior oropharyngeal erythema present  No pharyngeal swelling, oropharyngeal exudate or uvula swelling  Neck:      Musculoskeletal: Normal range of motion  Thyroid: Thyromegaly present  Cardiovascular:      Rate and Rhythm: Normal rate and regular rhythm  Heart sounds: Normal heart sounds  Pulmonary:      Effort: Pulmonary effort is normal       Breath sounds: Normal breath sounds  Lymphadenopathy:      Cervical: No cervical adenopathy  Neurological:      Mental Status: She is alert and oriented to person, place, and time     Psychiatric:         Mood and Affect: Mood normal          Behavior: Behavior normal

## 2021-02-24 NOTE — PATIENT INSTRUCTIONS
Rapid strep negative, will send out culture and will call if abnormal  rec f/u with PCP for thyroid imaging and blood work  Possible ref to GI for swallow study if persists    Follow up with PCP in 3-5 days  Proceed to  ER if symptoms worsen

## 2021-02-26 ENCOUNTER — HOSPITAL ENCOUNTER (EMERGENCY)
Facility: HOSPITAL | Age: 26
Discharge: HOME | End: 2021-02-26
Attending: EMERGENCY MEDICINE
Payer: COMMERCIAL

## 2021-02-26 ENCOUNTER — APPOINTMENT (EMERGENCY)
Dept: RADIOLOGY | Facility: HOSPITAL | Age: 26
End: 2021-02-26
Payer: COMMERCIAL

## 2021-02-26 VITALS
OXYGEN SATURATION: 99 % | RESPIRATION RATE: 20 BRPM | SYSTOLIC BLOOD PRESSURE: 154 MMHG | HEART RATE: 97 BPM | DIASTOLIC BLOOD PRESSURE: 97 MMHG | TEMPERATURE: 99.2 F

## 2021-02-26 DIAGNOSIS — R13.10 DYSPHAGIA: Primary | ICD-10-CM

## 2021-02-26 PROCEDURE — 99284 EMERGENCY DEPT VISIT MOD MDM: CPT

## 2021-02-26 PROCEDURE — 70360 X-RAY EXAM OF NECK: CPT

## 2021-02-26 PROCEDURE — 99284 EMERGENCY DEPT VISIT MOD MDM: CPT | Performed by: EMERGENCY MEDICINE

## 2021-02-26 RX ORDER — ESOMEPRAZOLE MAGNESIUM 40 MG/1
40 FOR SUSPENSION ORAL
Qty: 30 EACH | Refills: 0 | Status: SHIPPED | OUTPATIENT
Start: 2021-02-26 | End: 2021-08-16

## 2021-02-26 RX ORDER — ALBUTEROL SULFATE 90 UG/1
2 AEROSOL, METERED RESPIRATORY (INHALATION) EVERY 6 HOURS PRN
COMMUNITY
End: 2021-08-16 | Stop reason: SDUPTHER

## 2021-02-26 RX ORDER — SUCRALFATE ORAL 1 G/10ML
1 SUSPENSION ORAL 4 TIMES DAILY
Qty: 560 ML | Refills: 0 | Status: SHIPPED | OUTPATIENT
Start: 2021-02-26 | End: 2021-07-14

## 2021-02-26 NOTE — ED PROVIDER NOTES
History  Chief Complaint   Patient presents with    Difficulty Swallowing     seen by primary care Dr two days ago for difficulty swallowing,      25yoF c/o 1 week of difficulty swallowing and some pain with swallowing; had trouble taking her thyroid medication today  Tolerating liquids, most solids with occasional difficulty  Prior to Admission Medications   Prescriptions Last Dose Informant Patient Reported? Taking? Prenatal Vit-Fe Fumarate-FA (PRENATAL PO) Not Taking at Unknown time  Yes No   Sig: Take 1 tablet by mouth   albuterol (PROVENTIL HFA,VENTOLIN HFA) 90 mcg/act inhaler More than a month at Unknown time  Yes No   Sig: Inhale 2 puffs every 6 (six) hours as needed for wheezing   fluticasone (FLONASE) 50 mcg/act nasal spray   No No   Si sprays into each nostril 2 (two) times a day for 3 days   levothyroxine 150 mcg tablet Past Week at Unknown time  No Yes   Sig: TAKE ONE TABLET BY MOUTH EVERY DAY   Patient taking differently: 137 mcg       Facility-Administered Medications: None       Past Medical History:   Diagnosis Date    Abdominal pain     Altered bowel habits     constipation or diarrhea    Anxiety     Asthma     Back pain     Cellulitis     Resolved 2014     Contact lens/glasses fitting     Cryptic tonsil     Last assessed 2014     Disease of thyroid gland     hypothyroid    GERD (gastroesophageal reflux disease)     Hand paresthesia     Last assessed 2016     Heel pain, bilateral     left heel injected 17    Kidney stone     Loss of appetite     Resolved 2017        Past Surgical History:   Procedure Laterality Date    COLONOSCOPY N/A 10/2/2017    Procedure: COLONOSCOPY;  Surgeon: Jeri Rutherford MD;  Location: [facility] GI LAB; Service: Gastroenterology    ESOPHAGOGASTRODUODENOSCOPY N/A 10/2/2017    Procedure: ESOPHAGOGASTRODUODENOSCOPY (EGD); Surgeon: Jeri Rutherford MD;  Location: [facility] GI LAB;   Service: Gastroenterology    INDUCED  2011    KNEE ARTHROSCOPY Left     cartilage shaving       Family History   Problem Relation Age of Onset    Anxiety disorder Mother     Thyroid disease unspecified Mother         over or under active not sure    Hypothyroidism Sister     Asthma Sister     Cancer Family      I have reviewed and agree with the history as documented  E-Cigarette/Vaping    E-Cigarette Use Never User      E-Cigarette/Vaping Substances    Nicotine No     THC No     CBD No     Flavoring No     Other No     Unknown No      Social History     Tobacco Use    Smoking status: Current Every Day Smoker     Packs/day: 0 25     Years: 10 00     Pack years: 2 50     Types: Cigarettes    Smokeless tobacco: Never Used   Substance Use Topics    Alcohol use: Yes     Comment: occ    Drug use: No       Review of Systems   Constitutional: Negative for fever  Respiratory: Negative for cough  Gastrointestinal: Negative for abdominal pain  Musculoskeletal: Negative for back pain  Neurological: Negative for headaches  All other systems reviewed and are negative  Physical Exam  Physical Exam  Vitals signs reviewed  Constitutional:       Appearance: She is well-developed  HENT:      Head: Normocephalic and atraumatic  Right Ear: External ear normal       Left Ear: External ear normal       Nose: Nose normal  No rhinorrhea  Mouth/Throat:      Mouth: Mucous membranes are moist    Eyes:      Conjunctiva/sclera: Conjunctivae normal    Neck:      Musculoskeletal: Neck supple  Cardiovascular:      Rate and Rhythm: Normal rate and regular rhythm  Pulmonary:      Effort: Pulmonary effort is normal       Breath sounds: Normal breath sounds  No wheezing or rales  Abdominal:      Palpations: Abdomen is soft  Tenderness: There is no abdominal tenderness  Musculoskeletal:      Right lower leg: No edema  Left lower leg: No edema  Skin:     General: Skin is warm and dry     Neurological:      Mental Status: She is alert and oriented to person, place, and time  Psychiatric:         Mood and Affect: Mood normal          Vital Signs  ED Triage Vitals [02/26/21 1434]   Temperature Pulse Respirations Blood Pressure SpO2   99 2 °F (37 3 °C) 97 20 154/97 99 %      Temp Source Heart Rate Source Patient Position - Orthostatic VS BP Location FiO2 (%)   Tympanic Monitor Sitting Right arm --      Pain Score       3           Vitals:    02/26/21 1434   BP: 154/97   Pulse: 97   Patient Position - Orthostatic VS: Sitting         Visual Acuity      ED Medications  Medications - No data to display    Diagnostic Studies  Results Reviewed     None                 XR neck soft tissue   Final Result by Maira Valerio MD (02/26 0212)      Unremarkable neck soft tissue radiographs  Workstation performed: [id number]                    Procedures  Procedures         ED Course                                           MDM  Number of Diagnoses or Management Options  Dysphagia:   Diagnosis management comments: XR neg for airway narrowing / fb  PPI re-started and pt referred back to GI outpt      Disposition  Final diagnoses:   Dysphagia     Time reflects when diagnosis was documented in both MDM as applicable and the Disposition within this note     Time User Action Codes Description Comment    2/26/2021  4:24 PM Roxann Beck Add [R13 10] Dysphagia       ED Disposition     ED Disposition Condition Date/Time Comment    Discharge Stable Fri Feb 26, 2021  4:24 PM Aleisha Mcnally discharge to home/self care              Follow-up Information    None         Patient's Medications   Discharge Prescriptions    ESOMEPRAZOLE (NEXIUM) 40 MG PACKET    Take 40 mg by mouth daily in the early morning       Start Date: 2/26/2021 End Date: --       Order Dose: 40 mg       Quantity: 30 each    Refills: 0    SUCRALFATE (CARAFATE) 1 G/10 ML SUSPENSION    Take 10 mL (1 g total) by mouth 4 (four) times a day for 14 days       Start Date: 2/26/2021 End Date: 3/12/2021       Order Dose: 1 g       Quantity: 560 mL    Refills: 0     No discharge procedures on file      PDMP Review     None          ED Provider  Electronically Signed by           Courtney Pickens DO  02/26/21 4172

## 2021-02-28 LAB — B-HEM STREP SPEC QL CULT: NEGATIVE

## 2021-07-14 ENCOUNTER — OFFICE VISIT (OUTPATIENT)
Dept: FAMILY MEDICINE CLINIC | Facility: CLINIC | Age: 26
End: 2021-07-14
Payer: COMMERCIAL

## 2021-07-14 VITALS
SYSTOLIC BLOOD PRESSURE: 118 MMHG | BODY MASS INDEX: 39.44 KG/M2 | HEIGHT: 64 IN | OXYGEN SATURATION: 100 % | RESPIRATION RATE: 16 BRPM | WEIGHT: 231 LBS | DIASTOLIC BLOOD PRESSURE: 80 MMHG | TEMPERATURE: 97.9 F | HEART RATE: 93 BPM

## 2021-07-14 DIAGNOSIS — E03.9 HYPOTHYROIDISM, UNSPECIFIED TYPE: ICD-10-CM

## 2021-07-14 DIAGNOSIS — M54.31 BILATERAL SCIATICA: ICD-10-CM

## 2021-07-14 DIAGNOSIS — M54.32 BILATERAL SCIATICA: ICD-10-CM

## 2021-07-14 DIAGNOSIS — F41.9 ANXIETY: Primary | ICD-10-CM

## 2021-07-14 DIAGNOSIS — B35.3 TINEA PEDIS OF BOTH FEET: ICD-10-CM

## 2021-07-14 DIAGNOSIS — K21.9 GASTROESOPHAGEAL REFLUX DISEASE, UNSPECIFIED WHETHER ESOPHAGITIS PRESENT: ICD-10-CM

## 2021-07-14 PROCEDURE — 99212 OFFICE O/P EST SF 10 MIN: CPT | Performed by: FAMILY MEDICINE

## 2021-07-14 RX ORDER — GABAPENTIN 100 MG/1
100 CAPSULE ORAL
Qty: 30 CAPSULE | Refills: 0 | Status: SHIPPED | OUTPATIENT
Start: 2021-07-14 | End: 2021-08-16 | Stop reason: SDUPTHER

## 2021-07-14 RX ORDER — CALCIUM CARBONATE 750 MG/1
1 TABLET, CHEWABLE ORAL DAILY
Qty: 60 TABLET | Refills: 1 | Status: SHIPPED | OUTPATIENT
Start: 2021-07-14 | End: 2021-08-16 | Stop reason: ALTCHOICE

## 2021-07-14 RX ORDER — ESCITALOPRAM OXALATE 10 MG/1
10 TABLET ORAL DAILY
Qty: 30 TABLET | Refills: 1 | Status: SHIPPED | OUTPATIENT
Start: 2021-07-14 | End: 2021-09-09

## 2021-07-14 NOTE — PROGRESS NOTES
Assessment/Plan:     Diagnoses and all orders for this visit:    Anxiety  -     escitalopram (LEXAPRO) 10 mg tablet; Take 1 tablet (10 mg total) by mouth daily  - F/u in 4 wks for med tolerance  Bilateral sciatica  -     gabapentin (NEURONTIN) 100 mg capsule; Take 1 capsule (100 mg total) by mouth daily at bedtime    Gastroesophageal reflux disease, unspecified whether esophagitis present  -     CBC and differential; Future  -     calcium carbonate (TUMS EX) 750 mg chewable tablet; Chew 1 tablet (750 mg total) daily  -  Recommended to stop Nexium until pt is able to do EGD and see her GI doctor  Will f/u on this on next visit  - Recommended bland diet, reducing hot spicy food  Tinea pedis of both feet  -     miconazole (MICOTIN) 2 % powder; Apply topically as needed for itching    Hypothyroidism, unspecified type  -     TSH, 3rd generation with Free T4 reflex; Future    Other orders  -     NON FORMULARY; BIRTH CONTROL-NOT SURE WHICH ONE        Subjective:      Patient ID: Marielle Clement is a 27 y o  female  PMH of hypothyroidism, dysphagia, gastric ulcer, hemorrhoid, anxiety and exercise induced asthma came into the office to establish care as a new patient  Today, she is complaining of fatigue, muscle aches, and sciatica pain  She feels a constant generalized muscle ache throughout her body  It started after giving birth to her first born baby girl on 12/12/20  She is unable to pinpoint the exact trigger points  She has been feeling fatigued since giving birth as well  She takes care of the baby mostly by herself, the baby's father and pt's mom helps sometimes  She has a chronic sciatic pain radiating down her legs bilaterally  She also had been feeling anxious and found herself worrying about many things nearly every day  About 6 years ago, she was on Lexapro 10 mg for about 1 year due to her anxiety  She liked how she felt overall while she was on lexapro  She was recently at the ED for dysphagia  She felt a lump in her throat  Dysphagia with solid food, but tolerate liquid  There is no weight change since she delivered her baby girl  She has constipation and take stool softener to aid  Hx of hemorroid  Reports blood in the toilet that has been going on for awhile  Pt reports an episode of vomitting about 4-5 days ago  She felt epigastric pain and burning sensation before she throws up  The content of the vomitus is usually the food content and stomach acid  No blood noted  Her mom (age 46) had recently removed precancerous polyps on colonoscopy  Hx of smoking cigarrettes  She stopped during pregnancy, but recently started again on average 1 pack for every 3 days  13 years hx of smoking  Counseling on smoking cessation  Pt shows interest in trying  Pt also complained of athletic foot  She wears shoes and socks most of the day  The following portions of the patient's history were reviewed and updated as appropriate: allergies, current medications, past family history, past medical history, past social history, past surgical history and problem list     Review of Systems   Constitutional: Positive for fatigue  Negative for chills and fever  HENT: Negative for ear pain, sore throat and voice change  Eyes: Negative for pain and visual disturbance  Respiratory: Negative for cough and shortness of breath  Cardiovascular: Negative for chest pain and palpitations  Gastrointestinal: Positive for blood in stool, constipation and vomiting  Negative for abdominal pain  Genitourinary: Negative for dysuria and hematuria  Musculoskeletal: Negative for arthralgias and back pain  Skin: Negative for color change and rash  Neurological: Negative for seizures and syncope  Psychiatric/Behavioral: Positive for decreased concentration, dysphoric mood and sleep disturbance  Negative for self-injury and suicidal ideas  The patient is nervous/anxious  All other systems reviewed and are negative  Objective: Tobacco Cessation Counseling: Tobacco cessation counseling was provided  The patient is sincerely urged to quit consumption of tobacco  She is not ready to quit tobacco        /80 (BP Location: Left arm, Patient Position: Sitting, Cuff Size: Adult)   Pulse 93   Temp 97 9 °F (36 6 °C) (Tympanic)   Resp 16   Ht 5' 4" (1 626 m)   Wt 105 kg (231 lb)   SpO2 100%   BMI 39 65 kg/m²          Physical Exam  Constitutional:       Appearance: Normal appearance  Cardiovascular:      Rate and Rhythm: Normal rate and regular rhythm  Pulses: Normal pulses  Heart sounds: Normal heart sounds  Pulmonary:      Effort: Pulmonary effort is normal       Breath sounds: Normal breath sounds  Abdominal:      General: Bowel sounds are normal  There is no distension  Palpations: Abdomen is soft  Tenderness: There is abdominal tenderness (RLQ tenderness upon light palpation)  There is no guarding or rebound  Musculoskeletal:         General: Normal range of motion  Cervical back: Normal range of motion  Feet:      Comments: Skin peeling between toes  Neurological:      General: No focal deficit present  Mental Status: She is alert     Psychiatric:         Mood and Affect: Mood normal          Behavior: Behavior normal

## 2021-07-16 LAB
BASOPHILS # BLD AUTO: 0.1 X10E3/UL (ref 0–0.2)
BASOPHILS NFR BLD AUTO: 1 %
EOSINOPHIL # BLD AUTO: 0.2 X10E3/UL (ref 0–0.4)
EOSINOPHIL NFR BLD AUTO: 2 %
ERYTHROCYTE [DISTWIDTH] IN BLOOD BY AUTOMATED COUNT: 12.9 % (ref 11.7–15.4)
HCT VFR BLD AUTO: 44.3 % (ref 34–46.6)
HGB BLD-MCNC: 15 G/DL (ref 11.1–15.9)
IMM GRANULOCYTES # BLD: 0.2 X10E3/UL (ref 0–0.1)
IMM GRANULOCYTES NFR BLD: 2 %
LYMPHOCYTES # BLD AUTO: 3 X10E3/UL (ref 0.7–3.1)
LYMPHOCYTES NFR BLD AUTO: 27 %
MCH RBC QN AUTO: 29.5 PG (ref 26.6–33)
MCHC RBC AUTO-ENTMCNC: 33.9 G/DL (ref 31.5–35.7)
MCV RBC AUTO: 87 FL (ref 79–97)
MONOCYTES # BLD AUTO: 0.6 X10E3/UL (ref 0.1–0.9)
MONOCYTES NFR BLD AUTO: 5 %
NEUTROPHILS # BLD AUTO: 7.3 X10E3/UL (ref 1.4–7)
NEUTROPHILS NFR BLD AUTO: 63 %
PLATELET # BLD AUTO: 297 X10E3/UL (ref 150–450)
RBC # BLD AUTO: 5.08 X10E6/UL (ref 3.77–5.28)
SL AMB T4, FREE (DIRECT): 1.03 NG/DL (ref 0.82–1.77)
TSH SERPL DL<=0.005 MIU/L-ACNC: 5.36 UIU/ML (ref 0.45–4.5)
WBC # BLD AUTO: 11.4 X10E3/UL (ref 3.4–10.8)

## 2021-08-14 ENCOUNTER — APPOINTMENT (EMERGENCY)
Dept: RADIOLOGY | Facility: HOSPITAL | Age: 26
End: 2021-08-14
Payer: COMMERCIAL

## 2021-08-14 ENCOUNTER — HOSPITAL ENCOUNTER (EMERGENCY)
Facility: HOSPITAL | Age: 26
Discharge: HOME | End: 2021-08-14
Attending: EMERGENCY MEDICINE
Payer: COMMERCIAL

## 2021-08-14 VITALS
TEMPERATURE: 98 F | WEIGHT: 230 LBS | BODY MASS INDEX: 39.48 KG/M2 | RESPIRATION RATE: 16 BRPM | HEART RATE: 98 BPM | OXYGEN SATURATION: 98 % | DIASTOLIC BLOOD PRESSURE: 70 MMHG | SYSTOLIC BLOOD PRESSURE: 124 MMHG

## 2021-08-14 DIAGNOSIS — R10.9 ABDOMINAL PAIN: Primary | ICD-10-CM

## 2021-08-14 LAB
ALBUMIN SERPL BCP-MCNC: 3.8 G/DL (ref 3.5–5)
ALP SERPL-CCNC: 80 U/L (ref 46–116)
ALT SERPL W P-5'-P-CCNC: 31 U/L (ref 12–78)
ANION GAP SERPL CALCULATED.3IONS-SCNC: 13 MMOL/L (ref 4–13)
AST SERPL W P-5'-P-CCNC: 17 U/L (ref 5–45)
BACTERIA UR QL AUTO: NORMAL /HPF
BASOPHILS # BLD AUTO: 0.04 THOUSANDS/ΜL (ref 0–0.1)
BASOPHILS NFR BLD AUTO: 0 % (ref 0–1)
BILIRUB DIRECT SERPL-MCNC: 0.09 MG/DL (ref 0–0.2)
BILIRUB SERPL-MCNC: 0.48 MG/DL (ref 0.2–1)
BILIRUB UR QL STRIP: NEGATIVE
BUN SERPL-MCNC: 11 MG/DL (ref 5–25)
CALCIUM SERPL-MCNC: 8.9 MG/DL (ref 8.3–10.1)
CHLORIDE SERPL-SCNC: 105 MMOL/L (ref 100–108)
CLARITY UR: CLEAR
CO2 SERPL-SCNC: 21 MMOL/L (ref 21–32)
COLOR UR: ABNORMAL
CREAT SERPL-MCNC: 0.87 MG/DL (ref 0.6–1.3)
EOSINOPHIL # BLD AUTO: 0.38 THOUSAND/ΜL (ref 0–0.61)
EOSINOPHIL NFR BLD AUTO: 2 % (ref 0–6)
ERYTHROCYTE [DISTWIDTH] IN BLOOD BY AUTOMATED COUNT: 13.3 % (ref 11.6–15.1)
EXT PREG TEST URINE: NEGATIVE
EXT. CONTROL ED NAV: NORMAL
GFR SERPL CREATININE-BSD FRML MDRD: 91 ML/MIN/1.73SQ M
GLUCOSE SERPL-MCNC: 102 MG/DL (ref 65–140)
GLUCOSE UR STRIP-MCNC: NEGATIVE MG/DL
HCT VFR BLD AUTO: 49.6 % (ref 34.8–46.1)
HGB BLD-MCNC: 16.3 G/DL (ref 11.5–15.4)
HGB UR QL STRIP.AUTO: ABNORMAL
IMM GRANULOCYTES # BLD AUTO: 0.13 THOUSAND/UL (ref 0–0.2)
IMM GRANULOCYTES NFR BLD AUTO: 1 % (ref 0–2)
KETONES UR STRIP-MCNC: ABNORMAL MG/DL
LACTATE SERPL-SCNC: 0.8 MMOL/L (ref 0.5–2)
LEUKOCYTE ESTERASE UR QL STRIP: NEGATIVE
LIPASE SERPL-CCNC: 103 U/L (ref 73–393)
LYMPHOCYTES # BLD AUTO: 1.34 THOUSANDS/ΜL (ref 0.6–4.47)
LYMPHOCYTES NFR BLD AUTO: 7 % (ref 14–44)
MAGNESIUM SERPL-MCNC: 2.1 MG/DL (ref 1.6–2.6)
MCH RBC QN AUTO: 30.1 PG (ref 26.8–34.3)
MCHC RBC AUTO-ENTMCNC: 32.9 G/DL (ref 31.4–37.4)
MCV RBC AUTO: 92 FL (ref 82–98)
MONOCYTES # BLD AUTO: 0.79 THOUSAND/ΜL (ref 0.17–1.22)
MONOCYTES NFR BLD AUTO: 4 % (ref 4–12)
NEUTROPHILS # BLD AUTO: 15.88 THOUSANDS/ΜL (ref 1.85–7.62)
NEUTS SEG NFR BLD AUTO: 86 % (ref 43–75)
NITRITE UR QL STRIP: NEGATIVE
NON-SQ EPI CELLS URNS QL MICRO: NORMAL /HPF
NRBC BLD AUTO-RTO: 0 /100 WBCS
PH UR STRIP.AUTO: 6.5 [PH]
PLATELET # BLD AUTO: 303 THOUSANDS/UL (ref 149–390)
PMV BLD AUTO: 11.1 FL (ref 8.9–12.7)
POTASSIUM SERPL-SCNC: 4.2 MMOL/L (ref 3.5–5.3)
PROT SERPL-MCNC: 8.2 G/DL (ref 6.4–8.2)
PROT UR STRIP-MCNC: NEGATIVE MG/DL
RBC # BLD AUTO: 5.42 MILLION/UL (ref 3.81–5.12)
RBC #/AREA URNS AUTO: NORMAL /HPF
SODIUM SERPL-SCNC: 139 MMOL/L (ref 136–145)
SP GR UR STRIP.AUTO: 1.02 (ref 1–1.03)
UROBILINOGEN UR QL STRIP.AUTO: 0.2 E.U./DL
WBC # BLD AUTO: 18.56 THOUSAND/UL (ref 4.31–10.16)
WBC #/AREA URNS AUTO: NORMAL /HPF

## 2021-08-14 PROCEDURE — 80048 BASIC METABOLIC PNL TOTAL CA: CPT | Performed by: PHYSICIAN ASSISTANT

## 2021-08-14 PROCEDURE — 81025 URINE PREGNANCY TEST: CPT | Performed by: PHYSICIAN ASSISTANT

## 2021-08-14 PROCEDURE — 80076 HEPATIC FUNCTION PANEL: CPT | Performed by: PHYSICIAN ASSISTANT

## 2021-08-14 PROCEDURE — 83605 ASSAY OF LACTIC ACID: CPT | Performed by: PHYSICIAN ASSISTANT

## 2021-08-14 PROCEDURE — 36415 COLL VENOUS BLD VENIPUNCTURE: CPT | Performed by: PHYSICIAN ASSISTANT

## 2021-08-14 PROCEDURE — 76705 ECHO EXAM OF ABDOMEN: CPT

## 2021-08-14 PROCEDURE — 81001 URINALYSIS AUTO W/SCOPE: CPT | Performed by: PHYSICIAN ASSISTANT

## 2021-08-14 PROCEDURE — 99284 EMERGENCY DEPT VISIT MOD MDM: CPT

## 2021-08-14 PROCEDURE — 83735 ASSAY OF MAGNESIUM: CPT | Performed by: PHYSICIAN ASSISTANT

## 2021-08-14 PROCEDURE — 74177 CT ABD & PELVIS W/CONTRAST: CPT

## 2021-08-14 PROCEDURE — 99285 EMERGENCY DEPT VISIT HI MDM: CPT | Performed by: PHYSICIAN ASSISTANT

## 2021-08-14 PROCEDURE — 96374 THER/PROPH/DIAG INJ IV PUSH: CPT

## 2021-08-14 PROCEDURE — 96376 TX/PRO/DX INJ SAME DRUG ADON: CPT

## 2021-08-14 PROCEDURE — 85025 COMPLETE CBC W/AUTO DIFF WBC: CPT | Performed by: PHYSICIAN ASSISTANT

## 2021-08-14 PROCEDURE — 83690 ASSAY OF LIPASE: CPT | Performed by: PHYSICIAN ASSISTANT

## 2021-08-14 PROCEDURE — C9113 INJ PANTOPRAZOLE SODIUM, VIA: HCPCS | Performed by: PHYSICIAN ASSISTANT

## 2021-08-14 PROCEDURE — 96375 TX/PRO/DX INJ NEW DRUG ADDON: CPT

## 2021-08-14 PROCEDURE — 96361 HYDRATE IV INFUSION ADD-ON: CPT

## 2021-08-14 PROCEDURE — 87040 BLOOD CULTURE FOR BACTERIA: CPT | Performed by: PHYSICIAN ASSISTANT

## 2021-08-14 RX ORDER — MORPHINE SULFATE 4 MG/ML
4 INJECTION, SOLUTION INTRAMUSCULAR; INTRAVENOUS ONCE
Status: COMPLETED | OUTPATIENT
Start: 2021-08-14 | End: 2021-08-14

## 2021-08-14 RX ORDER — PANTOPRAZOLE SODIUM 20 MG/1
20 TABLET, DELAYED RELEASE ORAL DAILY
Qty: 14 TABLET | Refills: 0 | Status: SHIPPED | OUTPATIENT
Start: 2021-08-14 | End: 2021-08-16 | Stop reason: SDUPTHER

## 2021-08-14 RX ORDER — ONDANSETRON 2 MG/ML
4 INJECTION INTRAMUSCULAR; INTRAVENOUS ONCE
Status: COMPLETED | OUTPATIENT
Start: 2021-08-14 | End: 2021-08-14

## 2021-08-14 RX ORDER — ONDANSETRON 4 MG/1
4 TABLET, ORALLY DISINTEGRATING ORAL EVERY 6 HOURS PRN
Qty: 30 TABLET | Refills: 0 | Status: SHIPPED | OUTPATIENT
Start: 2021-08-14 | End: 2022-02-14

## 2021-08-14 RX ORDER — PANTOPRAZOLE SODIUM 40 MG/1
40 INJECTION, POWDER, FOR SOLUTION INTRAVENOUS ONCE
Status: COMPLETED | OUTPATIENT
Start: 2021-08-14 | End: 2021-08-14

## 2021-08-14 RX ADMIN — PANTOPRAZOLE SODIUM 40 MG: 40 INJECTION, POWDER, FOR SOLUTION INTRAVENOUS at 14:42

## 2021-08-14 RX ADMIN — ONDANSETRON 4 MG: 2 INJECTION INTRAMUSCULAR; INTRAVENOUS at 14:44

## 2021-08-14 RX ADMIN — SODIUM CHLORIDE 1000 ML: 0.9 INJECTION, SOLUTION INTRAVENOUS at 10:40

## 2021-08-14 RX ADMIN — ONDANSETRON 4 MG: 2 INJECTION INTRAMUSCULAR; INTRAVENOUS at 10:40

## 2021-08-14 RX ADMIN — MORPHINE SULFATE 4 MG: 4 INJECTION INTRAVENOUS at 10:47

## 2021-08-14 RX ADMIN — IOHEXOL 100 ML: 350 INJECTION, SOLUTION INTRAVENOUS at 13:54

## 2021-08-14 NOTE — ED PROVIDER NOTES
History  Chief Complaint   Patient presents with    Abdominal Pain     pt woke up from epigastric pain @0430 this am- reports projectile vomitted, denies constipation/diarrhea     27year old female presents with abdominal pain x1 day  She reports having epigastric pain since early this morning  Has had several episodes of vomiting  Does have history of gastritis and peptic ulcers in the past however states she has not had any ulcer flare-up since 2014  Has not seen a GI physician in several years  She used to be on Nexium daily however her PCP had taken her off  She reports the most pain is in epigastric and right upper quadrant area  No family history cholecystectomy  No fever, chills, chest pain, difficulty breathing, shortness of breath  No diarrhea or constipation  No headache, dizziness, lightheadedness, weakness  Prior to Admission Medications   Prescriptions Last Dose Informant Patient Reported? Taking?    NON FORMULARY   Yes Yes   Sig: BIRTH CONTROL-NOT SURE WHICH ONE   albuterol (PROVENTIL HFA,VENTOLIN HFA) 90 mcg/act inhaler   Yes Yes   Sig: Inhale 2 puffs every 6 (six) hours as needed for wheezing   calcium carbonate (TUMS EX) 750 mg chewable tablet   No Yes   Sig: Chew 1 tablet (750 mg total) daily   escitalopram (LEXAPRO) 10 mg tablet   No Yes   Sig: Take 1 tablet (10 mg total) by mouth daily   esomeprazole (NexIUM) 40 mg packet Not Taking at Unknown time  No No   Sig: Take 40 mg by mouth daily in the early morning   Patient not taking: Reported on 8/14/2021   gabapentin (NEURONTIN) 100 mg capsule   No Yes   Sig: Take 1 capsule (100 mg total) by mouth daily at bedtime   levothyroxine 150 mcg tablet   No Yes   Sig: TAKE ONE TABLET BY MOUTH EVERY DAY   Patient taking differently: 137 mcg    miconazole (MICOTIN) 2 % powder   No No   Sig: Apply topically as needed for itching      Facility-Administered Medications: None       Past Medical History:   Diagnosis Date    Abdominal pain     Altered bowel habits     constipation or diarrhea    Anxiety     Asthma     Back pain     Cellulitis     Resolved 2014     Contact lens/glasses fitting     Cryptic tonsil     Last assessed 2014     Disease of thyroid gland     hypothyroid    GERD (gastroesophageal reflux disease)     Hand paresthesia     Last assessed 2016     Heel pain 2016    Heel pain, bilateral     left heel injected 17    Kidney stone     Loss of appetite     Resolved 2017        Past Surgical History:   Procedure Laterality Date    COLONOSCOPY N/A 10/2/2017    Procedure: COLONOSCOPY;  Surgeon: Hardeep Mayes MD;  Location[facility] GI LAB; Service: Gastroenterology    ESOPHAGOGASTRODUODENOSCOPY N/A 10/2/2017    Procedure: ESOPHAGOGASTRODUODENOSCOPY (EGD); Surgeon: Hardeep Mayes MD;  Location: [facility] GI LAB; Service: Gastroenterology    INDUCED       KNEE ARTHROSCOPY Left     cartilage shaving       Family History   Problem Relation Age of Onset    Anxiety disorder Mother     Thyroid disease unspecified Mother         over or under active not sure    Colon polyps Mother 46        Precancerous colon polyps removed    Hypothyroidism Sister     Asthma Sister     Cancer Family      I have reviewed and agree with the history as documented  E-Cigarette/Vaping    E-Cigarette Use Never User      E-Cigarette/Vaping Substances    Nicotine No     THC No     CBD No     Flavoring No     Other No     Unknown No      Social History     Tobacco Use    Smoking status: Current Every Day Smoker     Packs/day: 0 50     Years: 13 00     Pack years: 6 50     Types: Cigarettes    Smokeless tobacco: Never Used   Vaping Use    Vaping Use: Never used   Substance Use Topics    Alcohol use: Not Currently     Comment: occ    Drug use: Yes     Types: Marijuana     Comment: marijuana helps THC       Review of Systems   Constitutional: Negative for chills and fever     HENT: Negative for sneezing and sore throat  Respiratory: Negative for cough and shortness of breath  Cardiovascular: Negative for chest pain, palpitations and leg swelling  Gastrointestinal: Positive for abdominal pain, nausea and vomiting  Negative for constipation and diarrhea  Genitourinary: Negative for decreased urine volume, difficulty urinating, dyspareunia, dysuria, enuresis, flank pain, frequency, genital sores, hematuria, menstrual problem, pelvic pain, urgency, vaginal bleeding, vaginal discharge and vaginal pain  Musculoskeletal: Negative for back pain, gait problem and joint swelling  Skin: Negative for color change, pallor, rash and wound  Neurological: Negative for dizziness, syncope, weakness, light-headedness, numbness and headaches  All other systems reviewed and are negative  Physical Exam  Physical Exam  Vitals and nursing note reviewed  Constitutional:       General: She is not in acute distress  Appearance: Normal appearance  She is well-developed and normal weight  She is not diaphoretic  HENT:      Head: Normocephalic and atraumatic  Nose: Nose normal    Eyes:      Extraocular Movements: Extraocular movements intact  Conjunctiva/sclera: Conjunctivae normal    Cardiovascular:      Rate and Rhythm: Normal rate and regular rhythm  Pulses: Normal pulses  Heart sounds: Normal heart sounds  No murmur heard  No friction rub  No gallop  Pulmonary:      Effort: Pulmonary effort is normal  No respiratory distress  Breath sounds: Normal breath sounds  No stridor  No wheezing or rales  Comments: Sp02 is 99% indicating adequate oxygenation on room air  Abdominal:      General: Abdomen is flat  Bowel sounds are normal  There is no distension  Palpations: Abdomen is soft  Tenderness: There is abdominal tenderness in the epigastric area  There is no guarding or rebound  Musculoskeletal:         General: Normal range of motion        Cervical back: Normal range of motion and neck supple  Skin:     General: Skin is warm  Capillary Refill: Capillary refill takes less than 2 seconds  Coloration: Skin is not pale  Findings: No erythema or rash  Neurological:      Mental Status: She is alert  Mental status is at baseline  Vital Signs  ED Triage Vitals [08/14/21 1019]   Temperature Pulse Respirations Blood Pressure SpO2   98 °F (36 7 °C) 105 17 122/87 99 %      Temp Source Heart Rate Source Patient Position - Orthostatic VS BP Location FiO2 (%)   Tympanic -- Sitting Left arm --      Pain Score       8           Vitals:    08/14/21 1019 08/14/21 1446   BP: 122/87 124/70   Pulse: 105 98   Patient Position - Orthostatic VS: Sitting          Visual Acuity      ED Medications  Medications   sodium chloride 0 9 % bolus 1,000 mL (0 mL Intravenous Stopped 8/14/21 1140)   ondansetron (ZOFRAN) injection 4 mg (4 mg Intravenous Given 8/14/21 1040)   morphine (PF) 4 mg/mL injection 4 mg (4 mg Intravenous Given 8/14/21 1047)   iohexol (OMNIPAQUE) 350 MG/ML injection (SINGLE-DOSE) 100 mL (100 mL Intravenous Given 8/14/21 1354)   pantoprazole (PROTONIX) injection 40 mg (40 mg Intravenous Given 8/14/21 1442)   ondansetron (ZOFRAN) injection 4 mg (4 mg Intravenous Given 8/14/21 1444)       Diagnostic Studies  Results Reviewed     Procedure Component Value Units Date/Time    Lactic acid [[id number]]  (Normal) Collected: 08/14/21 1130    Lab Status: Final result Specimen: Blood from Arm, Left Updated: 08/14/21 1155     LACTIC ACID 0 8 mmol/L     Narrative:      Result may be elevated if tourniquet was used during collection  Blood culture #1 [[id number]] Collected: 08/14/21 1150    Lab Status: In process Specimen: Blood from Arm, Right Updated: 08/14/21 1154    Blood culture #2 [[id number]] Collected: 08/14/21 1130    Lab Status:  In process Specimen: Blood from Arm, Left Updated: 08/14/21 1134    Lipase [[id number]]  (Normal) Collected: 08/14/21 1040    Lab Status: Final result Specimen: Blood from Arm, Right Updated: 08/14/21 1103     Lipase 103 u/L     Basic metabolic panel [[id number]] Collected: 08/14/21 1040    Lab Status: Final result Specimen: Blood from Arm, Right Updated: 08/14/21 1103     Sodium 139 mmol/L      Potassium 4 2 mmol/L      Chloride 105 mmol/L      CO2 21 mmol/L      ANION GAP 13 mmol/L      BUN 11 mg/dL      Creatinine 0 87 mg/dL      Glucose 102 mg/dL      Calcium 8 9 mg/dL      eGFR 91 ml/min/1 73sq m     Narrative:      [place] guidelines for Chronic Kidney Disease (CKD):     Stage 1 with normal or high GFR (GFR > 90 mL/min/1 73 square meters)    Stage 2 Mild CKD (GFR = 60-89 mL/min/1 73 square meters)    Stage 3A Moderate CKD (GFR = 45-59 mL/min/1 73 square meters)    Stage 3B Moderate CKD (GFR = 30-44 mL/min/1 73 square meters)    Stage 4 Severe CKD (GFR = 15-29 mL/min/1 73 square meters)    Stage 5 End Stage CKD (GFR <15 mL/min/1 73 square meters)  Note: GFR calculation is accurate only with a steady state creatinine    Hepatic function panel [[id number]]  (Normal) Collected: 08/14/21 1040    Lab Status: Final result Specimen: Blood from Arm, Right Updated: 08/14/21 1103     Total Bilirubin 0 48 mg/dL      Bilirubin, Direct 0 09 mg/dL      Alkaline Phosphatase 80 U/L      AST 17 U/L      ALT 31 U/L      Total Protein 8 2 g/dL      Albumin 3 8 g/dL     Magnesium [[id number]]  (Normal) Collected: 08/14/21 1040    Lab Status: Final result Specimen: Blood from Arm, Right Updated: 08/14/21 1103     Magnesium 2 1 mg/dL     Urine Microscopic [[id number]]  (Normal) Collected: 08/14/21 1047    Lab Status: Final result Specimen: Urine, Other Updated: 08/14/21 1100     RBC, UA 1-2 /hpf      WBC, UA 0-1 /hpf      Epithelial Cells Occasional /hpf      Bacteria, UA Occasional /hpf     UA w Reflex to Microscopic w Reflex to Culture [[id number]]  (Abnormal) Collected: 08/14/21 1047    Lab Status: Final result Specimen: Urine, Other Updated: 08/14/21 1054     Color, UA Light Yellow     Clarity, UA Clear     Specific [employer], UA 1 020     pH, UA 6 5     Leukocytes, UA Negative     Nitrite, UA Negative     Protein, UA Negative mg/dl      Glucose, UA Negative mg/dl      Ketones, UA Trace mg/dl      Urobilinogen, UA 0 2 E U /dl      Bilirubin, UA Negative     Blood, UA Trace-Intact    POCT pregnancy, urine [[id number]]  (Normal) Resulted: 08/14/21 1046    Lab Status: Final result Updated: 08/14/21 1047     EXT PREG TEST UR (Ref: Negative) negative     Control valid    CBC and differential [[id number]]  (Abnormal) Collected: 08/14/21 1040    Lab Status: Final result Specimen: Blood from Arm, Right Updated: 08/14/21 1046     WBC 18 56 Thousand/uL      RBC 5 42 Million/uL      Hemoglobin 16 3 g/dL      Hematocrit 49 6 %      MCV 92 fL      MCH 30 1 pg      MCHC 32 9 g/dL      RDW 13 3 %      MPV 11 1 fL      Platelets 136 Thousands/uL      nRBC 0 /100 WBCs      Neutrophils Relative 86 %      Immat GRANS % 1 %      Lymphocytes Relative 7 %      Monocytes Relative 4 %      Eosinophils Relative 2 %      Basophils Relative 0 %      Neutrophils Absolute 15 88 Thousands/µL      Immature Grans Absolute 0 13 Thousand/uL      Lymphocytes Absolute 1 34 Thousands/µL      Monocytes Absolute 0 79 Thousand/µL      Eosinophils Absolute 0 38 Thousand/µL      Basophils Absolute 0 04 Thousands/µL                  CT abdomen pelvis with contrast   Final Result by Ángel Gamboa MD (08/14 1416)      No acute pathology  Workstation performed: [id number]         US gallbladder   Final Result by Angelika Eaton MD (08/14 1250)      No gallstones are seen  3 mm right kidney stone        Workstation performed: [id number]                    Procedures  Procedures         ED Course  ED Course as of Aug 14 1606   Sat Aug 14, 2021   1052 Madonna in house and aware of gallbladder US, will be by after scan at 11                                SBIRT 20yo+      Most Recent Value SBIRT (25 yo +)   In order to provide better care to our patients, we are screening all of our patients for alcohol and drug use  Would it be okay to ask you these screening questions? No Filed at: 08/14/2021 1051                    [facility]  Number of Diagnoses or Management Options  Abdominal pain  Diagnosis management comments: Imaging and UA negative   Will trial on protonix and zofran  Advised to avoid spicy, minty, chocolately, red colored foods, avoid laying down for 1 hour after eating, eat smaller meals, etc  Given GI follow up  Gave patient proper education regarding diagnosis  Answered all questions  Return to ED for any worsening of symptoms otherwise follow up with primary care physician for re-evaluation  Discussed plan with patient who verbalized understanding and agreed to plan  Amount and/or Complexity of Data Reviewed  Clinical lab tests: reviewed and ordered  Tests in the radiology section of CPT®: ordered and reviewed  Tests in the medicine section of CPT®: ordered and reviewed  Discussion of test results with the performing providers: yes  Review and summarize past medical records: yes  Discuss the patient with other providers: yes  Independent visualization of images, tracings, or specimens: yes        Disposition  Final diagnoses:   Abdominal pain     Time reflects when diagnosis was documented in both MDM as applicable and the Disposition within this note     Time User Action Codes Description Comment    8/14/2021  2:43 PM Andi Paredes Add [R10 9] Abdominal pain       ED Disposition     ED Disposition Condition Date/Time Comment    Discharge Stable Sat Aug 14, 2021  2:43 PM Trey Fleming discharge to home/self care              Follow-up Information     Follow up With Specialties Details Why Contact Info Additional Information    Catarina Hairston MD Gastroenterology Call in 2 days To make follow-up appointment for abdominal pain [place] [id number]  1 [facility] Emergency Department Emergency Medicine Go to  As needed [address] [address]  [address] [address] Emergency Department, [place], [place], [address]          Discharge Medication List as of 8/14/2021  2:45 PM      START taking these medications    Details   ondansetron (ZOFRAN-ODT) 4 mg disintegrating tablet Take 1 tablet (4 mg total) by mouth every 6 (six) hours as needed for nausea or vomiting, Starting Sat 8/14/2021, Normal      pantoprazole (PROTONIX) 20 mg tablet Take 1 tablet (20 mg total) by mouth daily, Starting Sat 8/14/2021, Normal         CONTINUE these medications which have NOT CHANGED    Details   albuterol (PROVENTIL HFA,VENTOLIN HFA) 90 mcg/act inhaler Inhale 2 puffs every 6 (six) hours as needed for wheezing, Historical Med      calcium carbonate (TUMS EX) 750 mg chewable tablet Chew 1 tablet (750 mg total) daily, Starting Wed 7/14/2021, Normal      escitalopram (LEXAPRO) 10 mg tablet Take 1 tablet (10 mg total) by mouth daily, Starting Wed 7/14/2021, Normal      gabapentin (NEURONTIN) 100 mg capsule Take 1 capsule (100 mg total) by mouth daily at bedtime, Starting Wed 7/14/2021, Normal      levothyroxine 150 mcg tablet TAKE ONE TABLET BY MOUTH EVERY DAY, Normal      NON FORMULARY BIRTH CONTROL-NOT SURE WHICH ONE, Historical Med      esomeprazole (NexIUM) 40 mg packet Take 40 mg by mouth daily in the early morning, Starting Fri 2/26/2021, Normal      miconazole (MICOTIN) 2 % powder Apply topically as needed for itching, Starting Wed 7/14/2021, Normal           No discharge procedures on file      PDMP Review     None          ED Provider  Electronically Signed by           Raissa Hairston PA-C  08/14/21 6663

## 2021-08-16 ENCOUNTER — TELEPHONE (OUTPATIENT)
Dept: ADMINISTRATIVE | Facility: OTHER | Age: 26
End: 2021-08-16

## 2021-08-16 ENCOUNTER — OFFICE VISIT (OUTPATIENT)
Dept: FAMILY MEDICINE CLINIC | Facility: CLINIC | Age: 26
End: 2021-08-16
Payer: COMMERCIAL

## 2021-08-16 VITALS
BODY MASS INDEX: 37.79 KG/M2 | HEIGHT: 65 IN | WEIGHT: 226.8 LBS | TEMPERATURE: 97.9 F | OXYGEN SATURATION: 98 % | DIASTOLIC BLOOD PRESSURE: 74 MMHG | SYSTOLIC BLOOD PRESSURE: 112 MMHG | RESPIRATION RATE: 18 BRPM | HEART RATE: 102 BPM

## 2021-08-16 DIAGNOSIS — B35.3 TINEA PEDIS OF BOTH FEET: ICD-10-CM

## 2021-08-16 DIAGNOSIS — K29.70 GASTRITIS, PRESENCE OF BLEEDING UNSPECIFIED, UNSPECIFIED CHRONICITY, UNSPECIFIED GASTRITIS TYPE: ICD-10-CM

## 2021-08-16 DIAGNOSIS — E03.9 HYPOTHYROIDISM, UNSPECIFIED TYPE: ICD-10-CM

## 2021-08-16 DIAGNOSIS — K29.70 GASTRITIS, PRESENCE OF BLEEDING UNSPECIFIED, UNSPECIFIED CHRONICITY, UNSPECIFIED GASTRITIS TYPE: Primary | ICD-10-CM

## 2021-08-16 DIAGNOSIS — F41.9 ANXIETY: ICD-10-CM

## 2021-08-16 DIAGNOSIS — Z00.00 ANNUAL PHYSICAL EXAM: ICD-10-CM

## 2021-08-16 DIAGNOSIS — M54.31 BILATERAL SCIATICA: ICD-10-CM

## 2021-08-16 DIAGNOSIS — M54.32 BILATERAL SCIATICA: ICD-10-CM

## 2021-08-16 PROBLEM — Z13.89 SCREENING FOR CARDIOVASCULAR, RESPIRATORY, AND GENITOURINARY DISEASES: Status: RESOLVED | Noted: 2019-04-22 | Resolved: 2021-08-16

## 2021-08-16 PROBLEM — Z13.6 SCREENING FOR CARDIOVASCULAR, RESPIRATORY, AND GENITOURINARY DISEASES: Status: RESOLVED | Noted: 2019-04-22 | Resolved: 2021-08-16

## 2021-08-16 PROBLEM — Z13.1 SCREENING FOR DIABETES MELLITUS (DM): Status: RESOLVED | Noted: 2019-04-22 | Resolved: 2021-08-16

## 2021-08-16 PROBLEM — Z13.83 SCREENING FOR CARDIOVASCULAR, RESPIRATORY, AND GENITOURINARY DISEASES: Status: RESOLVED | Noted: 2019-04-22 | Resolved: 2021-08-16

## 2021-08-16 PROCEDURE — 4004F PT TOBACCO SCREEN RCVD TLK: CPT | Performed by: FAMILY MEDICINE

## 2021-08-16 PROCEDURE — 3008F BODY MASS INDEX DOCD: CPT | Performed by: FAMILY MEDICINE

## 2021-08-16 PROCEDURE — 99395 PREV VISIT EST AGE 18-39: CPT | Performed by: FAMILY MEDICINE

## 2021-08-16 RX ORDER — LEVOTHYROXINE SODIUM 0.15 MG/1
150 TABLET ORAL DAILY
Qty: 90 TABLET | Refills: 1 | Status: SHIPPED | OUTPATIENT
Start: 2021-08-16 | End: 2022-05-09

## 2021-08-16 RX ORDER — ALBUTEROL SULFATE 90 UG/1
2 AEROSOL, METERED RESPIRATORY (INHALATION) EVERY 6 HOURS PRN
Qty: 8 G | Refills: 1 | Status: SHIPPED | OUTPATIENT
Start: 2021-08-16 | End: 2022-02-14 | Stop reason: SDUPTHER

## 2021-08-16 RX ORDER — GABAPENTIN 100 MG/1
100 CAPSULE ORAL
Qty: 30 CAPSULE | Refills: 0 | Status: SHIPPED | OUTPATIENT
Start: 2021-08-16 | End: 2021-09-13

## 2021-08-16 RX ORDER — PANTOPRAZOLE SODIUM 20 MG/1
20 TABLET, DELAYED RELEASE ORAL DAILY
Qty: 14 TABLET | Refills: 0 | Status: SHIPPED | OUTPATIENT
Start: 2021-08-16 | End: 2021-10-07 | Stop reason: SDUPTHER

## 2021-08-16 NOTE — PATIENT INSTRUCTIONS
Diet for Stomach Ulcers and Gastritis   WHAT YOU NEED TO KNOW:   What is a diet for stomach ulcers and gastritis? A diet for ulcers and gastritis is a meal plan that limits foods that irritate your stomach  Certain foods may worsen symptoms such as stomach pain, bloating, heartburn, or indigestion  Which foods should I limit or avoid? You may need to avoid acidic, spicy, or high-fat foods  Not all foods affect everyone the same way  You will need to learn which foods worsen your symptoms and limit those foods  The following are some foods that may worsen ulcer or gastritis symptoms:  · Beverages:      ? Whole milk and chocolate milk    ? Hot cocoa and cola    ? Any beverage with caffeine    ? Regular and decaffeinated coffee    ? Peppermint and spearmint tea    ? Green and black tea, with or without caffeine    ? Orange and grapefruit juices    ? Drinks that contain alcohol    · Spices and seasonings:      ? Black and red pepper    ? Chili powder    ? Mustard seed and nutmeg    · Other foods:      ? Dairy foods made from whole milk or cream    ? Chocolate    ? Spicy or strongly flavored cheeses, such as jalapeno or black pepper    ? Highly seasoned, high-fat meats, such as sausage, salami, headley, ham, and cold cuts    ? Hot chiles and peppers    ? Tomato products, such as tomato paste, tomato sauce, or tomato juice    Which foods can I eat and drink? Eat a variety of healthy foods from all the food groups  Eat fruits, vegetables, whole grains, and fat-free or low-fat dairy foods  Whole grains include whole-wheat breads, cereals, pasta, and brown rice  Choose lean meats, poultry (chicken and turkey), fish, beans, eggs, and nuts  A healthy meal plan is low in unhealthy fats, salt, and added sugar  Healthy fats include olive oil and canola oil  Ask your dietitian for more information about a healthy meal plan  What other guidelines may be helpful? · Do not eat right before bedtime    Stop eating at least 2 hours before bedtime  · Eat small, frequent meals  Your stomach may tolerate small, frequent meals better than large meals  CARE AGREEMENT:   You have the right to help plan your care  Discuss treatment options with your healthcare provider to decide what care you want to receive  You always have the right to refuse treatment  The above information is an  only  It is not intended as medical advice for individual conditions or treatments  Talk to your doctor, nurse or pharmacist before following any medical regimen to see if it is safe and effective for you  © Copyright [employer] 2021 Information is for End User's use only and may not be sold, redistributed or otherwise used for commercial purposes  All illustrations and images included in CareNotes® are the copyrighted property of [employer]  or [employer]  Visit for Adults   AMBULATORY CARE:   A wellness visit  is when you see your healthcare provider to get screened for health problems  Your healthcare provider will also give you advice on how to stay healthy  Write down your questions so you remember to ask them  Ask your healthcare provider how often you should have a wellness visit  What happens at a wellness visit:  Your healthcare provider will ask about your health, and your family history of health problems  This includes high blood pressure, heart disease, and cancer  He or she will ask if you have symptoms that concern you, if you smoke, and about your mood  You may also be asked about your intake of medicines, supplements, food, and alcohol  Any of the following may be done:  · Your weight  will be checked  Your height may also be checked so your body mass index (BMI) can be calculated  Your BMI shows if you are at a healthy weight  · Your blood pressure  and heart rate will be checked  Your temperature may also be checked  · Blood and urine tests  may be done   Blood tests may be done to check your cholesterol levels  Abnormal cholesterol levels increase your risk for heart disease and stroke  You may also need a blood or urine test to check for diabetes if you are at increased risk  Urine tests may be done to look for signs of an infection or kidney disease  · A physical exam  includes checking your heartbeat and lungs with a stethoscope  Your healthcare provider may also check your skin to look for sun damage  · Screening tests  may be recommended  A screening test is done to check for diseases that may not cause symptoms  The screening tests you may need depend on your age, gender, family history, and lifestyle habits  For example, colorectal screening may be recommended if you are 48years old or older  Screening tests you need if you are a woman:   · A Pap smear  is used to screen for cervical cancer  Pap smears are usually done every 3 to 5 years depending on your age  You may need them more often if you have had abnormal Pap smear test results in the past  Ask your healthcare provider how often you should have a Pap smear  · A mammogram  is an x-ray of your breasts to screen for breast cancer  Experts recommend mammograms every 2 years starting at age 48 years  You may need a mammogram at age 52 years or younger if you have an increased risk for breast cancer  Talk to your healthcare provider about when you should start having mammograms and how often you need them  Vaccines you may need:   · Get an influenza vaccine  every year  The influenza vaccine protects you from the flu  Several types of viruses cause the flu  The viruses change over time, so new vaccines are made each year  · Get a tetanus-diphtheria (Td) booster vaccine  every 10 years  This vaccine protects you against tetanus and diphtheria  Tetanus is a severe infection that may cause painful muscle spasms and lockjaw   Diphtheria is a severe bacterial infection that causes a thick covering in the back of your mouth and throat  · Get a human papillomavirus (HPV) vaccine  if you are female and aged 23 to 32 or male 23 to 24 and never received it  This vaccine protects you from HPV infection  HPV is the most common infection spread by sexual contact  HPV may also cause vaginal, penile, and anal cancers  · Get a pneumococcal vaccine  if you are aged 72 years or older  The pneumococcal vaccine is an injection given to protect you from pneumococcal disease  Pneumococcal disease is an infection caused by pneumococcal bacteria  The infection may cause pneumonia, meningitis, or an ear infection  · Get a shingles vaccine  if you are 60 or older, even if you have had shingles before  The shingles vaccine is an injection to protect you from the varicella-zoster virus  This is the same virus that causes chickenpox  Shingles is a painful rash that develops in people who had chickenpox or have been exposed to the virus  How to eat healthy:  My Plate is a model for planning healthy meals  It shows the types and amounts of foods that should go on your plate  Fruits and vegetables make up about half of your plate, and grains and protein make up the other half  A serving of dairy is included on the side of your plate  The amount of calories and serving sizes you need depends on your age, gender, weight, and height  Examples of healthy foods are listed below:  · Eat a variety of vegetables  such as dark green, red, and orange vegetables  You can also include canned vegetables low in sodium (salt) and frozen vegetables without added butter or sauces  · Eat a variety of fresh fruits , canned fruit in 100% juice, frozen fruit, and dried fruit  · Include whole grains  At least half of the grains you eat should be whole grains   Examples include whole-wheat bread, wheat pasta, brown rice, and whole-grain cereals such as oatmeal     · Eat a variety of protein foods such as seafood (fish and shellfish), lean meat, and poultry without skin (turkey and chicken)  Examples of lean meats include pork leg, shoulder, or tenderloin, and beef round, sirloin, tenderloin, and extra lean ground beef  Other protein foods include eggs and egg substitutes, beans, peas, soy products, nuts, and seeds  · Choose low-fat dairy products such as skim or 1% milk or low-fat yogurt, cheese, and cottage cheese  · Limit unhealthy fats  such as butter, hard margarine, and shortening  Exercise:  Exercise at least 30 minutes per day on most days of the week  Some examples of exercise include walking, biking, dancing, and swimming  You can also fit in more physical activity by taking the stairs instead of the elevator or parking farther away from stores  Include muscle strengthening activities 2 days each week  Regular exercise provides many health benefits  It helps you manage your weight, and decreases your risk for type 2 diabetes, heart disease, stroke, and high blood pressure  Exercise can also help improve your mood  Ask your healthcare provider about the best exercise plan for you  General health and safety guidelines:   · Do not smoke  Nicotine and other chemicals in cigarettes and cigars can cause lung damage  Ask your healthcare provider for information if you currently smoke and need help to quit  E-cigarettes or smokeless tobacco still contain nicotine  Talk to your healthcare provider before you use these products  · Limit alcohol  A drink of alcohol is 12 ounces of beer, 5 ounces of wine, or 1½ ounces of liquor  · Lose weight, if needed  Being overweight increases your risk of certain health conditions  These include heart disease, high blood pressure, type 2 diabetes, and certain types of cancer  · Protect your skin  Do not sunbathe or use tanning beds  Use sunscreen with a SPF 15 or higher  Apply sunscreen at least 15 minutes before you go outside  Reapply sunscreen every 2 hours   Wear protective clothing, hats, and sunglasses when you are outside  · Drive safely  Always wear your seatbelt  Make sure everyone in your car wears a seatbelt  A seatbelt can save your life if you are in an accident  Do not use your cell phone when you are driving  This could distract you and cause an accident  Pull over if you need to make a call or send a text message  · Practice safe sex  Use latex condoms if are sexually active and have more than one partner  Your healthcare provider may recommend screening tests for sexually transmitted infections (STIs)  · Wear helmets, lifejackets, and protective gear  Always wear a helmet when you ride a bike or motorcycle, go skiing, or play sports that could cause a head injury  Wear protective equipment when you play sports  Wear a lifejacket when you are on a boat or doing water sports  © Copyright [employer] 2021 Information is for End User's use only and may not be sold, redistributed or otherwise used for commercial purposes  All illustrations and images included in CareNotes® are the copyrighted property of [employer] M [employer] Inc  or [facility] Penelope Jauregui   The above information is an  only  It is not intended as medical advice for individual conditions or treatments  Talk to your doctor, nurse or pharmacist before following any medical regimen to see if it is safe and effective for you  Cigarette Smoking and Your Health   AMBULATORY CARE:   Risks to your health if you smoke:  Nicotine and other chemicals found in tobacco and e-cigarettes can damage every cell in your body  Even if you are a light smoker, you have an increased risk for cancer, heart disease, and lung disease  If you are pregnant or have diabetes, smoking increases your risk for complications  Nicotine can affect an adolescent's developing brain  This can lead to trouble thinking, learning, or paying attention    Benefits to your health if you stop smoking:   · You decrease respiratory symptoms such as coughing, wheezing, and shortness of breath  · You reduce your risk for cancers of the lung, mouth, throat, kidney, bladder, pancreas, stomach, and cervix  If you already have cancer, you increase the benefits of chemotherapy  You also reduce your risk for cancer returning or a second cancer from developing  · You reduce your risk for heart disease, blood clots, heart attack, and stroke  · You reduce your risk for lung infections, and diseases such as pneumonia, asthma, chronic bronchitis, and emphysema  · Your circulation improves  More oxygen can be delivered to your body  If you have diabetes, you lower your risk for complications, such as kidney, artery, and eye diseases  You also lower your risk for nerve damage  Nerve damage can lead to amputations, poor vision, and blindness  · You improve your body's ability to heal and to fight infections  · An adolescent can help his or her brain and body develop in a healthy way  Talk to your adolescent about all the health risks of nicotine  If you can, start talking about nicotine when your child is younger than 12 years  This may make it easier for him or her not to start using nicotine as a teenager or adult  Explain to him or her that it is best never to start  It can be hard to try to quit later  Benefits to the health of others if you stop smoking:  Tobacco is harmful to nonsmokers who breathe in your secondhand smoke  The following are ways the health of others around you may improve when you stop smoking:  · You lower the risks for lung cancer and heart disease in nonsmoking adults  · If you are pregnant, you lower the risk for miscarriage, early delivery, low birth weight, and stillbirth  You also lower your baby's risk for SIDS, obesity, developmental delay, and neurobehavioral problems, such as ADHD  · If you have children, you lower their risk for ear infections, colds, pneumonia, bronchitis, and asthma      Follow up with your doctor as directed:  Write down your questions so you remember to ask them during your visits  For support and more information:   · American [employer] Association  [address]  [place] , [facility] [address]  Phone: [facility] [address]  Phone: 5- 320 - [phone]  Web Address: Lorrie Altamiranofly  [facility]    · Smokefree  gov  Phone: 6- 028 - 238-8299  Web Address: www smokefree  [address] 2021 Information is for End User's use only and may not be sold, redistributed or otherwise used for commercial purposes  All illustrations and images included in CareNotes® are the copyrighted property of A D A M [employer] Inc  or [address] [place] [address]  The above information is an  only  It is not intended as medical advice for individual conditions or treatments  Talk to your doctor, nurse or pharmacist before following any medical regimen to see if it is safe and effective for you

## 2021-08-16 NOTE — PROGRESS NOTES
[address] [facility]    NAME: Alverto Martel  AGE: 27 y o  SEX: female  : 1995     DATE: 2021     Assessment and Plan:     Bailey Medrano was seen today for follow-up  Diagnoses and all orders for this visit:    Gastritis, presence of bleeding unspecified, unspecified chronicity, unspecified gastritis type  Comments:  GI referral sent to Dr Mariia Ortiz, Continue with Protonix as prescribed and ondansetron as needed before patient gets a chance to see the GI specialist  Discussed possible need for another EGD and that she should talk to Dr Mariia Ortiz about that  Orders:  -     Ambulatory referral to Gastroenterology; Future  -     pantoprazole (PROTONIX) 20 mg tablet; Take 1 tablet (20 mg total) by mouth daily  - Take tylenol for pain as needed  Avoid NSAIDs  Hypothyroidism, unspecified type  -     levothyroxine 150 mcg tablet; Take 1 tablet (150 mcg total) by mouth daily  -  Increase levothyroxine dose from 137 mcg to 150 mcg today  Not sure if the insurance will allow her to get the 150 mcg doses today since she's recently refilled on the 137 mcg  We will be doing another TSH check in 6 weeks from when she started taking the 150 mcg doses  Anxiety   -    continue taking Lexapro 10 mg once a day  Annual physical exam  -     albuterol (PROVENTIL HFA,VENTOLIN HFA) 90 mcg/act inhaler; Inhale 2 puffs every 6 (six) hours as needed for wheezing    Bilateral sciatica  -     gabapentin (NEURONTIN) 100 mg capsule; Take 1 capsule (100 mg total) by mouth daily at bedtime    Tinea pedis of both feet  -     miconazole (MICOTIN) 2 % powder; Apply topically as needed for itching  -  Continue washing the foot and keeping them dry  Encouraged pt to continue putting on the powder after every wash  Immunizations and preventive care screenings were discussed with patient today   Appropriate education was printed on patient's after visit summary  Counseling:  Alcohol/drug use: discussed moderation in alcohol intake, the recommendations for healthy alcohol use, and avoidance of illicit drug use  Dental Health: discussed importance of regular tooth brushing, flossing, and dental visits  Sexual health: discussed sexually transmitted diseases, partner selection, use of condoms, avoidance of unintended pregnancy, and contraceptive alternatives  · Exercise: the importance of regular exercise/physical activity was discussed  Recommend exercise 3-5 times per week for at least 30 minutes  Return in 4 weeks (on 9/13/2021)  History of Present Illness:     Adult Annual Physical   Patient here for a comprehensive physical exam  The patient reports following problems:    1  Gastric ulcer:    Pt has been having epigastric pressure for a couple of months  Now it has progressed to constant burning that is worse with eating  She recently presented to the ED on Saturday with worsening symptoms including persistent nausea, vomiting and burning epigastric pain  She was able to tolerate water with Zofran  But any type of food makes her nausea and pain worse  She is not taking any medication for pain  Over the last month, she has noticed that her dark red stool  She has PMH of gastric ulcers upon EGD in 2017, and she was put on PPI for gastric ulcer  2  Anxiety:   Patient started on Lexapro about 4 weeks ago  And after 2 wks of the med, she noticed decreased episodes of crying and panic attacks  But she still have generalized worrying, fatigue, and muscle aches  She had a baby back in December  And 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  3  Bilateral sciatica:   Pt felt general relief of pain with the Gabapentin  But not necessarily helping with bilateral sciatica  4  Hypothyroidism:   Adjusted dosage of levothyroxine from 137mcg to 150 mcg due to the recent lab of TSH 5 36     5  Tinea pedis:   Not getting better with the powder   Pt is keeping the foot dry and using the powder regularly  Diet and Physical Activity  · Diet/Nutrition: well balanced diet, consuming 3-5 servings of fruits/vegetables daily, adequate fiber intake and adequate whole grain intake  · Exercise: walking  General Health  · Sleep: gets 7-8 hours of sleep on average  · Hearing: normal - bilateral   · Vision: wears contacts  · Dental: regular dental visits  /GYN Health: Pt sees a Gyn doc in [place] for OCP and screenings  · Last menstrual period: 8/3 - 8/7 spotting  8/14- now bleeding  Usually spotting, but now heavier flow  Lasts a week  · Contraceptive method: oral contraceptives  · History of STDs?: no      Review of Systems:     Review of Systems   Constitutional: Positive for appetite change (due to nausea), chills and diaphoresis (possibly due to pain)  Negative for fever and unexpected weight change  HENT: Negative for ear pain, sinus pain and sore throat  Eyes: Negative for pain and visual disturbance  Respiratory: Negative for cough and shortness of breath  Cardiovascular: Negative for chest pain and palpitations  Gastrointestinal: Positive for abdominal pain (epigastric), blood in stool (over the last month  dark red) and nausea  Negative for vomiting  Genitourinary: Negative for dysuria, flank pain and hematuria  Musculoskeletal: Positive for back pain  Negative for arthralgias and myalgias  Skin: Negative for color change and rash  Neurological: Positive for headaches  Negative for dizziness, seizures and syncope  All other systems reviewed and are negative       Past Medical History:     Past Medical History:   Diagnosis Date    Abdominal pain     Altered bowel habits     constipation or diarrhea    Anxiety     Asthma     Back pain     Cellulitis     Resolved 12/1/2014     Contact lens/glasses fitting     Cryptic tonsil     Last assessed 8/6/2014     Disease of thyroid gland     hypothyroid    GERD (gastroesophageal reflux disease)     Hand paresthesia     Last assessed 2016     Heel pain 2016    Heel pain, bilateral     left heel injected 17    Kidney stone     Loss of appetite     Resolved 2017       Past Surgical History:     Past Surgical History:   Procedure Laterality Date    COLONOSCOPY N/A 10/2/2017    Procedure: COLONOSCOPY;  Surgeon: Lauren Hoskins MD;  [facility]; Service: Gastroenterology    ESOPHAGOGASTRODUODENOSCOPY N/A 10/2/2017    Procedure: ESOPHAGOGASTRODUODENOSCOPY (EGD); Surgeon: Lauren Hoskins MD;  [facility];   Service: Gastroenterology    INDUCED       KNEE ARTHROSCOPY Left     cartilage shaving      Social History:     Social History     Tobacco Use    Smoking status: Current Every Day Smoker     Packs/day: 0 50     Years: 13 00     Pack years: 6 50     Types: Cigarettes    Smokeless tobacco: Never Used   Vaping Use    Vaping Use: Never used   Substance and Sexual Activity    Alcohol use: Not Currently     Comment: occ    Drug use: Yes     Types: Marijuana     Comment: marijuana helps THC    Sexual activity: Yes        Current Medications:     Current Outpatient Medications   Medication Sig Dispense Refill    albuterol (PROVENTIL HFA,VENTOLIN HFA) 90 mcg/act inhaler Inhale 2 puffs every 6 (six) hours as needed for wheezing 8 g 1    escitalopram (LEXAPRO) 10 mg tablet Take 1 tablet (10 mg total) by mouth daily 30 tablet 1    levothyroxine 150 mcg tablet Take 1 tablet (150 mcg total) by mouth daily 90 tablet 1    miconazole (MICOTIN) 2 % powder Apply topically as needed for itching 70 g 0    NON FORMULARY BIRTH CONTROL-NOT SURE WHICH ONE      ondansetron (ZOFRAN-ODT) 4 mg disintegrating tablet Take 1 tablet (4 mg total) by mouth every 6 (six) hours as needed for nausea or vomiting 30 tablet 0    pantoprazole (PROTONIX) 20 mg tablet Take 1 tablet (20 mg total) by mouth daily 14 tablet 0    gabapentin (NEURONTIN) 100 mg capsule Take 1 capsule (100 mg total) by mouth daily at bedtime 30 capsule 0     No current facility-administered medications for this visit  Allergies: Allergies   Allergen Reactions    Ciprofloxacin Other (See Comments)     Skin felt like it was "on fire"      Physical Exam:     /74 (BP Location: Left arm, Patient Position: Sitting, Cuff Size: Large)   Pulse 102   Temp 97 9 °F (36 6 °C) (Tympanic)   Resp 18   Ht 5' 5" (1 651 m)   Wt 103 kg (226 lb 12 8 oz)   LMP 08/06/2021 (Approximate)   SpO2 98%   BMI 37 74 kg/m²     Physical Exam  Constitutional:       General: She is in acute distress (appears to be in pain)  Appearance: She is diaphoretic (due to epigastric pain)  HENT:      Head: Normocephalic and atraumatic  Cardiovascular:      Rate and Rhythm: Normal rate and regular rhythm  Pulses: Normal pulses  Heart sounds: Normal heart sounds  Pulmonary:      Effort: Pulmonary effort is normal       Breath sounds: Normal breath sounds  No wheezing  Abdominal:      General: Bowel sounds are normal       Palpations: There is no mass  Tenderness: There is abdominal tenderness (epigastric)  There is right CVA tenderness (slightly tender)  There is no left CVA tenderness, guarding or rebound  Hernia: No hernia is present  Musculoskeletal:         General: Normal range of motion  Cervical back: Normal range of motion  Neurological:      General: No focal deficit present  Mental Status: She is alert     Psychiatric:         Mood and Affect: Mood normal          Behavior: Behavior normal           [address] MD Wil   [address]

## 2021-08-16 NOTE — TELEPHONE ENCOUNTER
Upon review of the In Basket request and the patient's chart, initial outreach has been made via fax, please see Contacts section for details       Thank you  Samra Calderon, [address] [facility] [phone]  Fax [phone]

## 2021-08-16 NOTE — TELEPHONE ENCOUNTER
----- Message from Katie Kearney MA sent at 8/16/2021 11:05 AM EDT -----  Regarding: care gap request  08/16/21 11:05 AM    Hello, our patient attached above has had Pap Smear (HPV) aka Cervical Cancer Screening completed/performed  Please assist in updating the patient chart by making an External outreach to [facility] Reji BARNES [address] [facility], [place], [address] [place] [address]  Their phone number [phone]  The date of service is August/September 2020      Thank you,  Katie Kearney MA   KEESHA MONZON

## 2021-08-17 NOTE — TELEPHONE ENCOUNTER
Upon review of the In Basket request we have found as a result of outreach that patient did not have the requested item(s) completed  Any additional questions or concerns should be emailed to the Practice Liaisons via [email or web address] email, please do not reply via In Basket      Thank you  Bhaskar Weinberg

## 2021-08-19 LAB
BACTERIA BLD CULT: NORMAL
BACTERIA BLD CULT: NORMAL

## 2021-09-09 DIAGNOSIS — F41.9 ANXIETY: ICD-10-CM

## 2021-09-09 RX ORDER — ESCITALOPRAM OXALATE 10 MG/1
TABLET ORAL
Qty: 30 TABLET | Refills: 1 | Status: SHIPPED | OUTPATIENT
Start: 2021-09-09 | End: 2021-10-25 | Stop reason: SDUPTHER

## 2021-09-11 DIAGNOSIS — M54.31 BILATERAL SCIATICA: ICD-10-CM

## 2021-09-11 DIAGNOSIS — M54.32 BILATERAL SCIATICA: ICD-10-CM

## 2021-09-13 RX ORDER — GABAPENTIN 100 MG/1
CAPSULE ORAL
Qty: 30 CAPSULE | Refills: 0 | Status: SHIPPED | OUTPATIENT
Start: 2021-09-13 | End: 2021-10-12

## 2021-10-07 ENCOUNTER — OFFICE VISIT (OUTPATIENT)
Dept: GASTROENTEROLOGY | Facility: CLINIC | Age: 26
End: 2021-10-07
Payer: COMMERCIAL

## 2021-10-07 VITALS
TEMPERATURE: 95.9 F | DIASTOLIC BLOOD PRESSURE: 81 MMHG | WEIGHT: 222.4 LBS | HEIGHT: 60 IN | SYSTOLIC BLOOD PRESSURE: 109 MMHG | BODY MASS INDEX: 43.66 KG/M2 | HEART RATE: 95 BPM

## 2021-10-07 DIAGNOSIS — R10.10 PAIN OF UPPER ABDOMEN: Primary | ICD-10-CM

## 2021-10-07 DIAGNOSIS — R19.8 ALTERNATING CONSTIPATION AND DIARRHEA: ICD-10-CM

## 2021-10-07 DIAGNOSIS — K29.70 GASTRITIS, PRESENCE OF BLEEDING UNSPECIFIED, UNSPECIFIED CHRONICITY, UNSPECIFIED GASTRITIS TYPE: ICD-10-CM

## 2021-10-07 PROCEDURE — 3008F BODY MASS INDEX DOCD: CPT | Performed by: INTERNAL MEDICINE

## 2021-10-07 PROCEDURE — 99204 OFFICE O/P NEW MOD 45 MIN: CPT | Performed by: INTERNAL MEDICINE

## 2021-10-07 PROCEDURE — 4004F PT TOBACCO SCREEN RCVD TLK: CPT | Performed by: INTERNAL MEDICINE

## 2021-10-07 RX ORDER — CALCIUM CARBONATE 200(500)MG
1 TABLET,CHEWABLE ORAL DAILY
COMMUNITY
End: 2021-10-12

## 2021-10-07 RX ORDER — PANTOPRAZOLE SODIUM 20 MG/1
40 TABLET, DELAYED RELEASE ORAL DAILY
Qty: 30 TABLET | Refills: 3 | Status: SHIPPED | OUTPATIENT
Start: 2021-10-07 | End: 2022-03-16

## 2021-10-07 RX ORDER — SUCRALFATE ORAL 1 G/10ML
1 SUSPENSION ORAL 2 TIMES DAILY
Qty: 420 ML | Refills: 2 | Status: SHIPPED | OUTPATIENT
Start: 2021-10-07 | End: 2021-10-12

## 2021-10-07 RX ORDER — DOCUSATE SODIUM 100 MG/1
100 CAPSULE, LIQUID FILLED ORAL AS NEEDED
COMMUNITY

## 2021-10-08 ENCOUNTER — TELEPHONE (OUTPATIENT)
Dept: GASTROENTEROLOGY | Facility: AMBULARY SURGERY CENTER | Age: 26
End: 2021-10-08

## 2021-10-08 PROCEDURE — U0005 INFEC AGEN DETEC AMPLI PROBE: HCPCS | Performed by: INTERNAL MEDICINE

## 2021-10-08 PROCEDURE — U0003 INFECTIOUS AGENT DETECTION BY NUCLEIC ACID (DNA OR RNA); SEVERE ACUTE RESPIRATORY SYNDROME CORONAVIRUS 2 (SARS-COV-2) (CORONAVIRUS DISEASE [COVID-19]), AMPLIFIED PROBE TECHNIQUE, MAKING USE OF HIGH THROUGHPUT TECHNOLOGIES AS DESCRIBED BY CMS-2020-01-R: HCPCS | Performed by: INTERNAL MEDICINE

## 2021-10-13 ENCOUNTER — TELEPHONE (OUTPATIENT)
Dept: GASTROENTEROLOGY | Facility: CLINIC | Age: 26
End: 2021-10-13

## 2021-10-19 ENCOUNTER — HOSPITAL ENCOUNTER (OUTPATIENT)
Dept: RADIOLOGY | Facility: HOSPITAL | Age: 26
Discharge: HOME | End: 2021-10-19
Attending: INTERNAL MEDICINE
Payer: COMMERCIAL

## 2021-10-19 DIAGNOSIS — K29.70 GASTRITIS, PRESENCE OF BLEEDING UNSPECIFIED, UNSPECIFIED CHRONICITY, UNSPECIFIED GASTRITIS TYPE: ICD-10-CM

## 2021-10-19 PROCEDURE — 76705 ECHO EXAM OF ABDOMEN: CPT

## 2021-10-20 LAB
CRP SERPL-MCNC: 3 MG/L (ref 0–10)
ERYTHROCYTE [SEDIMENTATION RATE] IN BLOOD BY WESTERGREN METHOD: 2 MM/HR (ref 0–32)

## 2021-10-25 DIAGNOSIS — F41.9 ANXIETY: ICD-10-CM

## 2021-10-25 RX ORDER — ESCITALOPRAM OXALATE 10 MG/1
10 TABLET ORAL DAILY
Qty: 30 TABLET | Refills: 1 | Status: SHIPPED | OUTPATIENT
Start: 2021-10-25 | End: 2021-12-17

## 2021-11-04 VITALS — HEIGHT: 64 IN | WEIGHT: 216 LBS | BODY MASS INDEX: 36.88 KG/M2

## 2021-11-04 PROCEDURE — U0003 INFECTIOUS AGENT DETECTION BY NUCLEIC ACID (DNA OR RNA); SEVERE ACUTE RESPIRATORY SYNDROME CORONAVIRUS 2 (SARS-COV-2) (CORONAVIRUS DISEASE [COVID-19]), AMPLIFIED PROBE TECHNIQUE, MAKING USE OF HIGH THROUGHPUT TECHNOLOGIES AS DESCRIBED BY CMS-2020-01-R: HCPCS | Performed by: INTERNAL MEDICINE

## 2021-11-04 PROCEDURE — U0005 INFEC AGEN DETEC AMPLI PROBE: HCPCS | Performed by: INTERNAL MEDICINE

## 2021-11-04 RX ORDER — NORETHINDRONE ACETATE AND ETHINYL ESTRADIOL 1; .02 MG/1; MG/1
1 TABLET ORAL DAILY
COMMUNITY

## 2021-11-09 ENCOUNTER — TELEPHONE (OUTPATIENT)
Dept: PERIOP | Facility: HOSPITAL | Age: 26
End: 2021-11-09

## 2021-11-10 ENCOUNTER — HOSPITAL ENCOUNTER (OUTPATIENT)
Dept: GASTROENTEROLOGY | Facility: AMBULARY SURGERY CENTER | Age: 26
Setting detail: OUTPATIENT SURGERY
Discharge: HOME | End: 2021-11-10
Attending: INTERNAL MEDICINE | Admitting: INTERNAL MEDICINE
Payer: COMMERCIAL

## 2021-11-10 ENCOUNTER — ANESTHESIA EVENT (OUTPATIENT)
Dept: GASTROENTEROLOGY | Facility: AMBULARY SURGERY CENTER | Age: 26
End: 2021-11-10

## 2021-11-10 ENCOUNTER — ANESTHESIA (OUTPATIENT)
Dept: GASTROENTEROLOGY | Facility: AMBULARY SURGERY CENTER | Age: 26
End: 2021-11-10

## 2021-11-10 VITALS
DIASTOLIC BLOOD PRESSURE: 63 MMHG | RESPIRATION RATE: 18 BRPM | HEART RATE: 58 BPM | SYSTOLIC BLOOD PRESSURE: 102 MMHG | HEIGHT: 64 IN | BODY MASS INDEX: 36.88 KG/M2 | TEMPERATURE: 95.7 F | OXYGEN SATURATION: 96 % | WEIGHT: 216 LBS

## 2021-11-10 DIAGNOSIS — K29.70 GASTRITIS, PRESENCE OF BLEEDING UNSPECIFIED, UNSPECIFIED CHRONICITY, UNSPECIFIED GASTRITIS TYPE: ICD-10-CM

## 2021-11-10 DIAGNOSIS — R10.10 PAIN OF UPPER ABDOMEN: ICD-10-CM

## 2021-11-10 LAB
EXT PREGNANCY TEST URINE: NEGATIVE
EXT. CONTROL: NORMAL

## 2021-11-10 PROCEDURE — 88305 TISSUE EXAM BY PATHOLOGIST: CPT | Performed by: PATHOLOGY

## 2021-11-10 PROCEDURE — 81025 URINE PREGNANCY TEST: CPT | Performed by: ANESTHESIOLOGY

## 2021-11-10 PROCEDURE — 43239 EGD BIOPSY SINGLE/MULTIPLE: CPT | Performed by: INTERNAL MEDICINE

## 2021-11-10 RX ORDER — SODIUM CHLORIDE, SODIUM LACTATE, POTASSIUM CHLORIDE, CALCIUM CHLORIDE 600; 310; 30; 20 MG/100ML; MG/100ML; MG/100ML; MG/100ML
100 INJECTION, SOLUTION INTRAVENOUS CONTINUOUS
Status: DISCONTINUED | OUTPATIENT
Start: 2021-11-10 | End: 2021-11-14 | Stop reason: HOSPADM

## 2021-11-10 RX ORDER — PROPOFOL 10 MG/ML
INJECTION, EMULSION INTRAVENOUS AS NEEDED
Status: DISCONTINUED | OUTPATIENT
Start: 2021-11-10 | End: 2021-11-10

## 2021-11-10 RX ORDER — LIDOCAINE HYDROCHLORIDE 20 MG/ML
INJECTION, SOLUTION EPIDURAL; INFILTRATION; INTRACAUDAL; PERINEURAL AS NEEDED
Status: DISCONTINUED | OUTPATIENT
Start: 2021-11-10 | End: 2021-11-10

## 2021-11-10 RX ADMIN — LIDOCAINE HYDROCHLORIDE 100 MG: 20 INJECTION, SOLUTION EPIDURAL; INFILTRATION; INTRACAUDAL; PERINEURAL at 12:51

## 2021-11-10 RX ADMIN — PROPOFOL 100 MG: 10 INJECTION, EMULSION INTRAVENOUS at 12:51

## 2021-11-10 RX ADMIN — PROPOFOL 150 MG: 10 INJECTION, EMULSION INTRAVENOUS at 12:55

## 2021-11-10 RX ADMIN — SODIUM CHLORIDE, SODIUM LACTATE, POTASSIUM CHLORIDE, AND CALCIUM CHLORIDE 100 ML/HR: .6; .31; .03; .02 INJECTION, SOLUTION INTRAVENOUS at 12:00

## 2021-12-17 DIAGNOSIS — F41.9 ANXIETY: ICD-10-CM

## 2021-12-17 RX ORDER — ESCITALOPRAM OXALATE 10 MG/1
TABLET ORAL
Qty: 30 TABLET | Refills: 1 | Status: SHIPPED | OUTPATIENT
Start: 2021-12-17 | End: 2022-02-14

## 2022-02-14 ENCOUNTER — OFFICE VISIT (OUTPATIENT)
Dept: FAMILY MEDICINE CLINIC | Facility: CLINIC | Age: 27
End: 2022-02-14
Payer: COMMERCIAL

## 2022-02-14 VITALS
HEIGHT: 64 IN | TEMPERATURE: 98 F | RESPIRATION RATE: 18 BRPM | WEIGHT: 208.8 LBS | HEART RATE: 105 BPM | SYSTOLIC BLOOD PRESSURE: 110 MMHG | BODY MASS INDEX: 35.65 KG/M2 | DIASTOLIC BLOOD PRESSURE: 80 MMHG | OXYGEN SATURATION: 97 %

## 2022-02-14 DIAGNOSIS — F41.8 DEPRESSION WITH ANXIETY: Primary | ICD-10-CM

## 2022-02-14 DIAGNOSIS — J45.990 EXERCISE INDUCED BRONCHOSPASM: ICD-10-CM

## 2022-02-14 DIAGNOSIS — Z72.0 SMOKING TRYING TO QUIT: ICD-10-CM

## 2022-02-14 DIAGNOSIS — R53.83 FATIGUE, UNSPECIFIED TYPE: ICD-10-CM

## 2022-02-14 DIAGNOSIS — R09.82 POST-NASAL DRIP: ICD-10-CM

## 2022-02-14 DIAGNOSIS — E03.9 HYPOTHYROIDISM, UNSPECIFIED TYPE: ICD-10-CM

## 2022-02-14 DIAGNOSIS — Z11.4 SCREENING FOR HIV (HUMAN IMMUNODEFICIENCY VIRUS): ICD-10-CM

## 2022-02-14 PROCEDURE — 99213 OFFICE O/P EST LOW 20 MIN: CPT | Performed by: FAMILY MEDICINE

## 2022-02-14 PROCEDURE — 3725F SCREEN DEPRESSION PERFORMED: CPT | Performed by: FAMILY MEDICINE

## 2022-02-14 PROCEDURE — 3008F BODY MASS INDEX DOCD: CPT | Performed by: FAMILY MEDICINE

## 2022-02-14 PROCEDURE — 4004F PT TOBACCO SCREEN RCVD TLK: CPT | Performed by: FAMILY MEDICINE

## 2022-02-14 RX ORDER — LORATADINE 10 MG/1
10 TABLET ORAL DAILY
Qty: 30 TABLET | Refills: 1 | Status: SHIPPED | OUTPATIENT
Start: 2022-02-14 | End: 2022-04-11

## 2022-02-14 RX ORDER — ESCITALOPRAM OXALATE 20 MG/1
20 TABLET ORAL DAILY
Qty: 60 TABLET | Refills: 0 | Status: SHIPPED | OUTPATIENT
Start: 2022-02-14 | End: 2022-04-13

## 2022-02-14 RX ORDER — ALBUTEROL SULFATE 90 UG/1
2 AEROSOL, METERED RESPIRATORY (INHALATION) EVERY 6 HOURS PRN
Qty: 18 G | Refills: 0 | Status: SHIPPED | OUTPATIENT
Start: 2022-02-14

## 2022-02-14 NOTE — PROGRESS NOTES
[address]  Todd Bowden MD, 22     Carrington Torre MRN: [id number] : 1995 Age: 27 y o  Assessment/Plan     Casey Daughters was seen today for follow-up  Diagnoses and all orders for this visit:    Depression with anxiety  Comments:  Increase to Lexapro 20 mg and follow-up in 4 weeks  Encouraged psychotherapy  Rule out other medical causes by getting TSH and vitamin-D level  Orders:  -     escitalopram (LEXAPRO) 20 mg tablet; Take 1 tablet (20 mg total) by mouth daily    Hypothyroidism, unspecified type  -     TSH, 3rd generation with Free T4 reflex; Future  -     TSH, 3rd generation with Free T4 reflex    Fatigue, unspecified type  -     Vitamin D 25 hydroxy; Future  -     Vitamin D 25 hydroxy    Post-nasal drip  Comments:  Encouraged smoking cessation  Loratadine for postnasal drip as needed  Orders:  -     loratadine (CLARITIN) 10 mg tablet; Take 1 tablet (10 mg total) by mouth daily    Smoking trying to quit  Comments:  Currently smoking half a pack a day  Goal is to reduce it to less than half a pack when we see each other in 4 wk  Does not want nicotine patch or gum today    Screening for HIV (human immunodeficiency virus)  -     HIV 1/2 Antigen/Antibody (4th Generation) w Reflex [facility]; Future    Exercise induced bronchospasm  -     albuterol (PROVENTIL HFA,VENTOLIN HFA) 90 mcg/act inhaler; Inhale 2 puffs every 6 (six) hours as needed for wheezing      BMI Counseling: There is no height or weight on file to calculate BMI  The BMI is above normal  Nutrition recommendations include decreasing portion sizes, encouraging healthy choices of fruits and vegetables, decreasing fast food intake, consuming healthier snacks, limiting drinks that contain sugar, moderation in carbohydrate intake, increasing intake of lean protein, reducing intake of saturated and trans fat and reducing intake of cholesterol   Exercise recommendations include moderate physical activity 150 minutes/week and exercising 3-5 times per week  No pharmacotherapy was ordered  Rationale for BMI follow-up plan is due to patient being overweight or obese  Patient refused all immunizations today including flu, COVID, and pneumococcal vaccine  She stated her last Pap smear was back in 2020 at a different facility  Recommended her to bring a copy of the Pap smear result on the next appointment  Not due for cervical cancer screening until 2023 if results were normal   Encouraged her to make a Well woman appointment  Audrey Farmer acknowledged understanding of treatment plan, all questions answered  Reminded of office on-call physician availability 24/7 for any concerns  Plan discussed with attending physician Dr Uziel Becerra     Return in about 4 weeks (around 3/14/2022)  Subjective      Audrey Farmer is a 27 y o  female  Chief Complaint   Patient presents with    Follow-up     f/u visit on anxiety/depression out of the all other issue ,it is not good since med is not heling ,no new food or drug allergies  HPI    68-year-old female with PMH of depression and anxiety, hypothyroidism, exercise induced bronchospasm and tobacco use presented today for worsening depression and anxiety  Her GED today was 13 and PHQ score was 25  She feels that the 10 mg of Lexapro was not helpful  In addition to feeling down and depressed, she is having difficulty with concentration, decreased energy, sleep, and decrease in appetite  She has not been able to engage in her hobby lately  She is taking care of her 3year-old baby by herself  Her family also have some financial difficulty  And she found out her g loss on both side are diagnosed with cancer  Her significant other also noticed that she has been more irritable than usual lately  She has not tried going to the therapy  She also has been having panic attacks more frequently, which she feels chest pain, diaphoresis, tremors    It seems to happen about 3 times weekly  She has had irregular menstruation, which she contributed to stress as well  She continues to smoke half a pack of cigarettes daily  Stated that it helps her with her anxiety  However she is having sinus drainage and postnasal drip  And that resulted in productive cough  The following portions of the patient's history were reviewed and updated as appropriate: allergies, current medications, past family history, past medical history, past social history, past surgical history and problem list     Review of Systems   Constitutional: Negative for chills and fever  HENT: Positive for postnasal drip  Negative for ear pain and sore throat  Eyes: Negative for pain and visual disturbance  Respiratory: Positive for cough (Productive)  Negative for shortness of breath  Cardiovascular: Negative for chest pain and palpitations  Gastrointestinal: Negative for abdominal pain and vomiting  Genitourinary: Negative for dysuria and hematuria  Musculoskeletal: Negative for arthralgias and back pain  Skin: Negative for color change and rash  Neurological: Negative for seizures and syncope  Psychiatric/Behavioral: Positive for decreased concentration, dysphoric mood and sleep disturbance  Negative for behavioral problems, self-injury and suicidal ideas  The patient is nervous/anxious  All other systems reviewed and are negative          Past Medical History:   Diagnosis Date    Abdominal pain     Altered bowel habits     constipation or diarrhea    Anxiety     Asthma     Back pain     Cellulitis     Resolved 12/1/2014     Contact lens/glasses fitting     Cryptic tonsil     Last assessed 8/6/2014     Disease of thyroid gland     hypothyroid    GERD (gastroesophageal reflux disease)     Hand paresthesia     Last assessed 8/5/2016     Heel pain 9/12/2016    Heel pain, bilateral     left heel injected 9/28/17    Kidney stone     Loss of appetite     Resolved 12/26/2017  Normal vaginal delivery 12/2020       Current Outpatient Medications   Medication Sig Dispense Refill    albuterol (PROVENTIL HFA,VENTOLIN HFA) 90 mcg/act inhaler Inhale 2 puffs every 6 (six) hours as needed for wheezing 18 g 0    docusate sodium (COLACE) 100 mg capsule Take 100 mg by mouth as needed for constipation      levothyroxine 150 mcg tablet Take 1 tablet (150 mcg total) by mouth daily 90 tablet 1    norethindrone-ethinyl estradiol (Stevan 1/20) 1-20 MG-MCG per tablet Take 1 tablet by mouth daily      pantoprazole (PROTONIX) 20 mg tablet Take 2 tablets (40 mg total) by mouth daily 30 tablet 3    Probiotic Product (Probiotic-10) CHEW Chew daily      escitalopram (LEXAPRO) 20 mg tablet Take 1 tablet (20 mg total) by mouth daily 60 tablet 0    loratadine (CLARITIN) 10 mg tablet Take 1 tablet (10 mg total) by mouth daily 30 tablet 1    miconazole (MICOTIN) 2 % powder Apply topically as needed for itching 70 g 0     No current facility-administered medications for this visit  Objective      /80 (BP Location: Left arm, Patient Position: Sitting, Cuff Size: Standard)   Pulse 105   Temp 98 °F (36 7 °C) (Tympanic)   Resp 18   Ht 5' 4" (1 626 m)   Wt 94 7 kg (208 lb 12 8 oz)   LMP 02/01/2022   SpO2 97%   BMI 35 84 kg/m²     Physical Exam  Constitutional:       General: She is not in acute distress  Appearance: Normal appearance  HENT:      Head: Normocephalic and atraumatic  Eyes:      Pupils: Pupils are equal, round, and reactive to light  Cardiovascular:      Rate and Rhythm: Normal rate and regular rhythm  Pulses: Normal pulses  Heart sounds: Normal heart sounds  No murmur heard  Pulmonary:      Effort: Pulmonary effort is normal  No respiratory distress  Breath sounds: Normal breath sounds  No wheezing  Abdominal:      General: Bowel sounds are normal       Palpations: Abdomen is soft  Skin:     General: Skin is warm and dry     Neurological: General: No focal deficit present  Mental Status: She is alert and oriented to person, place, and time  Psychiatric:         Mood and Affect: Mood normal          Behavior: Behavior normal          Thought Content:  Thought content normal

## 2022-03-16 DIAGNOSIS — K29.70 GASTRITIS, PRESENCE OF BLEEDING UNSPECIFIED, UNSPECIFIED CHRONICITY, UNSPECIFIED GASTRITIS TYPE: ICD-10-CM

## 2022-03-16 RX ORDER — PANTOPRAZOLE SODIUM 20 MG/1
TABLET, DELAYED RELEASE ORAL
Qty: 30 TABLET | Refills: 3 | Status: SHIPPED | OUTPATIENT
Start: 2022-03-16 | End: 2022-05-06 | Stop reason: SDUPTHER

## 2022-03-24 ENCOUNTER — APPOINTMENT (OUTPATIENT)
Dept: RADIOLOGY | Facility: CLINIC | Age: 27
End: 2022-03-24
Payer: COMMERCIAL

## 2022-03-24 ENCOUNTER — OFFICE VISIT (OUTPATIENT)
Dept: URGENT CARE | Facility: CLINIC | Age: 27
End: 2022-03-24
Payer: COMMERCIAL

## 2022-03-24 VITALS — HEART RATE: 100 BPM | OXYGEN SATURATION: 98 % | RESPIRATION RATE: 14 BRPM | TEMPERATURE: 97.6 F

## 2022-03-24 DIAGNOSIS — T14.90XA INJURY: ICD-10-CM

## 2022-03-24 DIAGNOSIS — M79.645 FINGER PAIN, LEFT: Primary | ICD-10-CM

## 2022-03-24 PROCEDURE — 99203 OFFICE O/P NEW LOW 30 MIN: CPT | Performed by: PHYSICIAN ASSISTANT

## 2022-03-24 PROCEDURE — 4004F PT TOBACCO SCREEN RCVD TLK: CPT | Performed by: PHYSICIAN ASSISTANT

## 2022-03-24 PROCEDURE — 73140 X-RAY EXAM OF FINGER(S): CPT

## 2022-03-24 NOTE — PROGRESS NOTES
[address] [employer] [facility]        NAME: Leon Dave is a 27 y o  female  : 1995    MRN: [id number]  DATE: 2022  TIME: 6:40 PM    Assessment and Plan   Finger pain, left [M79 [id number]]  1  Finger pain, left  XR finger left third digit-middle     X-ray clear per my read  Will follow with official radiology report and will call with results of any positive findings  Recommend rest ice compression elevation  NSAIDs for pain and swelling  Discussed strict return to care precautions as well as red flag symptoms which should prompt immediate ED referral  Pt verbalized understanding and is in agreement with plan  Please follow up with your primary care provider within the next week  Please remember that your visit today was with an urgent care provider and should not replace follow up with your primary care provider for chronic medical issues or annual physicals  Patient Instructions       Follow up with PCP in 3-5 days  Proceed to  ER if symptoms worsen  Chief Complaint     Chief Complaint   Patient presents with    Pain     pt presents with injury of the left hand 3rd digit r/t cracking her knuckles last night; states she is not able to open finger without increased pain         History of Present Illness       Patient is a 47-year-old female with no reported PMH who presents with left middle finger pain x1 day  States last night she was cracking her fingers, which she does by interlocking the palmar surfaces of both hands and performing some twisting motion  Ever since then she has been having proximal pain E and difficulty with full flexion and extension  States she occasionally gets a shooting pain go up the palmar aspect of her hand and up into her finger  Feels better with ice  No OTC meds tried  Review of Systems   Review of Systems   Constitutional: Negative for chills and diaphoresis  Respiratory: Negative for shortness of breath  Cardiovascular: Negative for chest pain  Gastrointestinal: Negative for nausea and vomiting  Musculoskeletal: Negative for arthralgias, back pain, joint swelling, myalgias and neck pain  Skin: Negative for color change and wound  Neurological: Negative for weakness and numbness           Current Medications       Current Outpatient Medications:     albuterol (PROVENTIL HFA,VENTOLIN HFA) 90 mcg/act inhaler, Inhale 2 puffs every 6 (six) hours as needed for wheezing, Disp: 18 g, Rfl: 0    escitalopram (LEXAPRO) 20 mg tablet, Take 1 tablet (20 mg total) by mouth daily, Disp: 60 tablet, Rfl: 0    levothyroxine 150 mcg tablet, Take 1 tablet (150 mcg total) by mouth daily, Disp: 90 tablet, Rfl: 1    loratadine (CLARITIN) 10 mg tablet, Take 1 tablet (10 mg total) by mouth daily, Disp: 30 tablet, Rfl: 1    norethindrone-ethinyl estradiol (Stevan 1/20) 1-20 MG-MCG per tablet, Take 1 tablet by mouth daily, Disp: , Rfl:     pantoprazole (PROTONIX) 20 mg tablet, TAKE TWO TABLETS BY MOUTH EVERY DAY, Disp: 30 tablet, Rfl: 3    Probiotic Product (Probiotic-10) CHEW, Chew daily, Disp: , Rfl:     docusate sodium (COLACE) 100 mg capsule, Take 100 mg by mouth as needed for constipation (Patient not taking: Reported on 3/24/2022 ), Disp: , Rfl:     miconazole (MICOTIN) 2 % powder, Apply topically as needed for itching (Patient not taking: Reported on 3/24/2022 ), Disp: 70 g, Rfl: 0    Current Allergies     Allergies as of 03/24/2022 - Reviewed 03/24/2022   Allergen Reaction Noted    Ciprofloxacin Other (See Comments) 01/14/2016            The following portions of the patient's history were reviewed and updated as appropriate: allergies, current medications, past family history, past medical history, past social history, past surgical history and problem list      Past Medical History:   Diagnosis Date    Abdominal pain     Altered bowel habits     constipation or diarrhea    Anxiety     Asthma     Back pain     Cellulitis     Resolved 12/1/2014     Contact lens/glasses fitting     Cryptic tonsil     Last assessed 2014     Disease of thyroid gland     hypothyroid    GERD (gastroesophageal reflux disease)     Hand paresthesia     Last assessed 2016     Heel pain 2016    Heel pain, bilateral     left heel injected 17    Kidney stone     Loss of appetite     Resolved 2017     Normal vaginal delivery 2020       Past Surgical History:   Procedure Laterality Date    COLONOSCOPY N/A 10/2/2017    Procedure: COLONOSCOPY;  Surgeon: Mary Beth Dukes MD;  Location: [place] [address] GI LAB; Service: Gastroenterology    ESOPHAGOGASTRODUODENOSCOPY N/A 10/2/2017    Procedure: ESOPHAGOGASTRODUODENOSCOPY (EGD); Surgeon: Mary Beth Dukes MD;  Location: [facility]; Service: Gastroenterology    INDUCED       KNEE ARTHROSCOPY Left     cartilage shaving    UPPER GASTROINTESTINAL ENDOSCOPY         Family History   Problem Relation Age of Onset    Anxiety disorder Mother     Thyroid disease unspecified Mother         over or under active not sure    Colon polyps Mother 46        Precancerous colon polyps removed    Hypothyroidism Sister     Asthma Sister     Cancer Family     Vision loss Maternal Uncle          Medications have been verified  Objective   Pulse 100   Temp 97 6 °F (36 4 °C)   Resp 14   LMP 03/15/2022   SpO2 98%        Physical Exam     Physical Exam  Vitals and nursing note reviewed  Constitutional:       General: She is not in acute distress  Appearance: Normal appearance  She is not ill-appearing  HENT:      Head: Normocephalic and atraumatic  Cardiovascular:      Rate and Rhythm: Normal rate  Pulmonary:      Effort: Pulmonary effort is normal  No respiratory distress  Musculoskeletal:         General: Tenderness (near 3rd MCP) present  No swelling, deformity or signs of injury  Normal range of motion        Comments: Full passive ROM, painful active full flexion and extension at MCP and PIP Skin:     General: Skin is warm and dry  Capillary Refill: Capillary refill takes less than 2 seconds  Neurological:      Mental Status: She is alert and oriented to person, place, and time     Psychiatric:         Behavior: Behavior normal

## 2022-04-11 DIAGNOSIS — R09.82 POST-NASAL DRIP: ICD-10-CM

## 2022-04-11 DIAGNOSIS — F41.8 DEPRESSION WITH ANXIETY: ICD-10-CM

## 2022-04-11 RX ORDER — LORATADINE 10 MG/1
TABLET ORAL
Qty: 30 TABLET | Refills: 1 | Status: SHIPPED | OUTPATIENT
Start: 2022-04-11

## 2022-04-13 RX ORDER — ESCITALOPRAM OXALATE 20 MG/1
TABLET ORAL
Qty: 60 TABLET | Refills: 0 | Status: SHIPPED | OUTPATIENT
Start: 2022-04-13

## 2022-05-06 ENCOUNTER — TELEPHONE (OUTPATIENT)
Dept: GASTROENTEROLOGY | Facility: CLINIC | Age: 27
End: 2022-05-06

## 2022-05-06 RX ORDER — PANTOPRAZOLE SODIUM 20 MG/1
40 TABLET, DELAYED RELEASE ORAL
Qty: 120 TABLET | Refills: 2 | Status: SHIPPED | OUTPATIENT
Start: 2022-05-06 | End: 2022-05-09

## 2022-05-06 NOTE — TELEPHONE ENCOUNTER
RX updated to 120 pills  Patient is taking 2 (20mg tablets)  2 times a day  I also did a refill    Thank you

## 2022-05-06 NOTE — TELEPHONE ENCOUNTER
I tried to submit key # on cover my meds and it would not go through  Created new pre auth for pantoprazole 20mg through cover my meds  Key #[id number]

## 2022-05-06 NOTE — TELEPHONE ENCOUNTER
Patients GI provider:  Dr Jose Montoya    Number to return call: [phone]    Reason for call: Pt called in stating that her pantoprazole was just changed to 2 times a day and since its only 30 pills they are going faster  She wanted to know if she could at least get enough for a month supply at a time  Above is her number       Scheduled procedure/appointment date if applicable: Appt 2/58/24

## 2022-05-09 DIAGNOSIS — E03.9 HYPOTHYROIDISM, UNSPECIFIED TYPE: ICD-10-CM

## 2022-05-09 DIAGNOSIS — K29.70 GASTRITIS, PRESENCE OF BLEEDING UNSPECIFIED, UNSPECIFIED CHRONICITY, UNSPECIFIED GASTRITIS TYPE: Primary | ICD-10-CM

## 2022-05-09 RX ORDER — LEVOTHYROXINE SODIUM 0.15 MG/1
TABLET ORAL
Qty: 90 TABLET | Refills: 1 | Status: SHIPPED | OUTPATIENT
Start: 2022-05-09

## 2022-05-09 RX ORDER — PANTOPRAZOLE SODIUM 40 MG/1
40 TABLET, DELAYED RELEASE ORAL DAILY
Qty: 90 TABLET | Refills: 3 | Status: SHIPPED | OUTPATIENT
Start: 2022-05-09

## 2022-05-09 NOTE — TELEPHONE ENCOUNTER
Received denial from [employer] for pantoprazole 20mg 2 tablets twice a day  Can a letter of necessity be done? Please advise  Thank you!

## 2022-05-09 NOTE — TELEPHONE ENCOUNTER
Patient reports she called the office to ask for a 3 month prescription  Appears her frequency of the medicine was increased instead  She is only taking 40 mg daily  I sent a new prescription for 40 mg daily with a 3 month supply  Hopefully this will help however advised her to give us a call with any problems  No insurance approval needed at this time

## 2022-05-09 NOTE — TELEPHONE ENCOUNTER
I do not see anything indicating that patient needs to be on Protonix 40 milligrams b i d  it appears she called early this month due to the prescription change however I do not see anything from our office making this rx change  We could consider trying PPI in the morning and Pepcid at nighttime prior to starting b i d  She has office appointment with us in 2 days and can discuss there was well  Attempted to call patient without answer    Left voicemail

## 2022-08-17 ENCOUNTER — TELEPHONE (OUTPATIENT)
Dept: FAMILY MEDICINE CLINIC | Facility: CLINIC | Age: 27
End: 2022-08-17

## 2022-08-17 ENCOUNTER — OFFICE VISIT (OUTPATIENT)
Dept: FAMILY MEDICINE CLINIC | Facility: CLINIC | Age: 27
End: 2022-08-17
Payer: COMMERCIAL

## 2022-08-17 VITALS
HEART RATE: 98 BPM | HEIGHT: 64 IN | WEIGHT: 197 LBS | TEMPERATURE: 97.6 F | DIASTOLIC BLOOD PRESSURE: 72 MMHG | RESPIRATION RATE: 14 BRPM | OXYGEN SATURATION: 98 % | SYSTOLIC BLOOD PRESSURE: 112 MMHG | BODY MASS INDEX: 33.63 KG/M2

## 2022-08-17 DIAGNOSIS — G89.29 CHRONIC BILATERAL LOW BACK PAIN WITH BILATERAL SCIATICA: Primary | ICD-10-CM

## 2022-08-17 DIAGNOSIS — M54.41 CHRONIC BILATERAL LOW BACK PAIN WITH BILATERAL SCIATICA: Primary | ICD-10-CM

## 2022-08-17 DIAGNOSIS — Z11.59 NEED FOR HEPATITIS C SCREENING TEST: ICD-10-CM

## 2022-08-17 DIAGNOSIS — M54.42 CHRONIC BILATERAL LOW BACK PAIN WITH BILATERAL SCIATICA: Primary | ICD-10-CM

## 2022-08-17 PROCEDURE — 99213 OFFICE O/P EST LOW 20 MIN: CPT | Performed by: FAMILY MEDICINE

## 2022-08-17 RX ORDER — NAPROXEN 500 MG/1
500 TABLET ORAL 2 TIMES DAILY WITH MEALS
Qty: 28 TABLET | Refills: 0 | Status: SHIPPED | OUTPATIENT
Start: 2022-08-17

## 2022-08-17 NOTE — TELEPHONE ENCOUNTER
Called patient to remind patient to complete labs previously ordered  Patient requested labs mailed to her  Labs printed and sent for mailing

## 2022-08-17 NOTE — PROGRESS NOTES
[facility]  Acute Complaint Visit    Patient's Information      Name: Faiza Clement  Age/Sex: 32 y o  female  MRN: [id number]  : 1995  SHERWIN: 22     Assessment/Plan     Visit Diagnoses:    1  Chronic bilateral low back pain with bilateral sciatica    2  Need for hepatitis C screening test        Orders:    Orders Placed This Encounter   Procedures    XR spine lumbar minimum 4 views non injury    Hepatitis C Antibody ([facility])    Ambulatory Referral to Physical Therapy      Medications:     New Prescriptions    NAPROXEN (NAPROSYN) 500 MG TABLET    Take 1 tablet (500 mg total) by mouth 2 (two) times a day with meals      A/P: Faiza Clement is a 32 y o  stable female patient that came to the clinic today with complaints of chronic lower back for the past three years that has been worsening in the past year after having her last child  Associated nausea and bilateral intermittent shooting pain extending down the posterior aspect of lower extremities towards the back of her heels  VS stable  PE remarkable for bilateral paraspinal lower lumbar back pain to palpation and positive bilateral straight leg test  Pt complaints consistent with possible chronic lower back with bilateral sciatica  Plan to: Advise patient to follow a 6 week course of conservative therapy  Patient can either take a maximum of 4 g of Tylenol, PO or NSAIDs  Will place patient on a two week course of Naproxen 500 mg, PO, BID for lower back pain since patient mentioned tylenol is no longer helpful for the pain  Sent referral for physical therapy  Will send Xray of lumbar spine for evaluation of possible fractures or osseous causes of lower back pain  Associated orders were discussed and explained to the pt  Pt voiced understanding and acceptance with A/P  Pt will call the office if any further questions/concerns       Next Visit: Return in about 1 - 2 months (around 10/17/2022) for follow up on lower back pain and annual physical      A/P of patient's case was discussed with the Attending, Dr Karolyn Morgan  Subjective     Chief Complaint     Chief Complaint   Patient presents with    Back Pain     Reports back pain for "years"  denies any injuries  Reports radiation to posterior bilateral extremities      History of Present Illness     Ashkan Rutherford is a 32 y o  female patient with PMH of asthma, anxiety and depression came to the clinic due to complaints of chronic lower back pain that has been worsening and interfering with her ability to do her daily activities like work  Has been worsening for the past year  There are moments while she is working where she gets shooting pain running from her lower back along the posterior aspect of her lower extremities towards the back of her heels  She also gets associated nausea when this happens  Back Pain    This is a chronic problem  Episode onset: About three years  Episode frequency: Constant discomfort; intermittent worsening lower back pain if patient is a long period of time standing or sitting  The problem has been gradually worsening since onset  The pain is present in the lumbar spine  The quality of the pain is described as aching and shooting (Constant aching discomfort in the lower back; intermittent sharp/shooting pain running down both legs)  Radiates to: Radiation from buttocks to heels of her feet, bilaterally  Worse on the right side  The pain is at a severity of 9/10 (3/10 lower back ache; 9/10 shooting/sharp pain)  The pain is moderate  The pain is the same all the time  The symptoms are aggravated by sitting, standing, position and twisting  Stiffness is present in the morning  Associated symptoms include abdominal pain, headaches, leg pain and paresthesias   Pertinent negatives include no bladder incontinence, bowel incontinence, chest pain, dysuria, fever, numbness, paresis, pelvic pain, perianal numbness, tingling, weakness or weight loss  Risk factors include obesity and poor posture  She has tried chiropractic manipulation (Tylenol when the pain gets worse  Has used gabapentin in the past but did not work ) for the symptoms  I personally reviewed the following portions of the patient's history and updated as appropriate if needed: allergies, current medications, past family history, past medical history, past social history, past surgical history, and problem list  Also reviewed pertinent past labs, imaging, procedures, consults and relevant notes by other providers and addressed important details and findings in A/P section  Review of Systems     History obtained from the patient  Constitutional: Negative for fever and weight loss  Cardiovascular: Negative for chest pain  Gastrointestinal: Positive for abdominal pain and nausea  Negative for bowel incontinence  Genitourinary: Negative for bladder incontinence, dysuria and pelvic pain  Musculoskeletal: Positive for back pain  Neurological: Positive for headaches and paresthesias  Negative for tingling, weakness, saddle anesthesia and numbness  Objective     Vital Signs     Visit Vitals  /72 (BP Location: Left arm, Patient Position: Sitting, Cuff Size: Adult)   Pulse 98   Temp 97 6 °F (36 4 °C) (Tympanic)   Resp 14   Ht 5' 4" (1 626 m)   Wt 89 4 kg (197 lb)   SpO2 98%   BMI 33 81 kg/m²   OB Status Unknown   Smoking Status Current Every Day Smoker   BSA 1 94 m²      I have reviewed the patient's vital signs and all significant values were addressed in the A/P section  Physical Exam      Constitutional:       General: She is not in acute distress  Appearance: Normal appearance  She is obese  She is not ill-appearing or toxic-appearing  HENT:      Head: Normocephalic and atraumatic  Right Ear: External ear normal       Left Ear: External ear normal    Eyes:      General: No scleral icterus  Conjunctiva/sclera: Conjunctivae normal    Cardiovascular:      Rate and Rhythm: Normal rate and regular rhythm  Pulses: Normal pulses  Heart sounds: Normal heart sounds  Pulmonary:      Effort: Pulmonary effort is normal       Breath sounds: Normal breath sounds  Abdominal:      General: Bowel sounds are normal  There is no distension  Palpations: Abdomen is soft  Musculoskeletal:         General: Tenderness (Bilateral paraspinal aching pain at lower lumbar area) present  No swelling  Normal range of motion  Cervical back: Normal range of motion  Right lower leg: No edema  Left lower leg: No edema  Skin:     General: Skin is warm and dry  Neurological:      Mental Status: She is alert and oriented to person, place, and time  Motor: No weakness  Gait: Gait normal       Comments: Straight Leg Test: Shooting pain in posterior thigh at 45 degrees of left leg and at 90 degrees of the right leg  Psychiatric:         Mood and Affect: Mood normal          Behavior: Behavior normal          Thought Content: Thought content normal          Judgment: Judgment normal      I personally examined this patient physically and all significant findings were addressed in the A/P section  It was a pleasure being of service to [facility]  Thank you  Bob Corcoran MD , MSMS  [id number]      08/17/22           Portions of the record may have been created with voice recognition software  Occasional wrong word or "sound a like" substitutions may have occurred due to the inherent limitations of voice recognition software  Read the chart carefully and recognize, using context, where substitutions have occurred

## 2022-08-22 DIAGNOSIS — R09.82 POST-NASAL DRIP: ICD-10-CM

## 2022-08-22 DIAGNOSIS — F41.8 DEPRESSION WITH ANXIETY: ICD-10-CM

## 2022-08-22 RX ORDER — ESCITALOPRAM OXALATE 20 MG/1
TABLET ORAL
Qty: 60 TABLET | Refills: 0 | Status: SHIPPED | OUTPATIENT
Start: 2022-08-22

## 2022-08-22 RX ORDER — LORATADINE 10 MG/1
TABLET ORAL
Qty: 30 TABLET | Refills: 1 | Status: SHIPPED | OUTPATIENT
Start: 2022-08-22

## 2022-10-14 LAB
25(OH)D3+25(OH)D2 SERPL-MCNC: 37.6 NG/ML (ref 30–100)
SL AMB T4, FREE (DIRECT): 0.72 NG/DL (ref 0.82–1.77)
TSH SERPL DL<=0.005 MIU/L-ACNC: 14.2 UIU/ML (ref 0.45–4.5)

## 2022-11-12 DIAGNOSIS — R09.82 POST-NASAL DRIP: ICD-10-CM

## 2022-11-14 RX ORDER — LORATADINE 10 MG/1
TABLET ORAL
Qty: 30 TABLET | Refills: 1 | Status: SHIPPED | OUTPATIENT
Start: 2022-11-14

## 2022-12-30 DIAGNOSIS — R09.82 POST-NASAL DRIP: ICD-10-CM

## 2022-12-30 RX ORDER — LORATADINE 10 MG/1
TABLET ORAL
Qty: 30 TABLET | Refills: 1 | Status: SHIPPED | OUTPATIENT
Start: 2022-12-30

## 2023-01-03 DIAGNOSIS — J45.990 EXERCISE INDUCED BRONCHOSPASM: ICD-10-CM

## 2023-01-06 RX ORDER — ALBUTEROL SULFATE 90 UG/1
AEROSOL, METERED RESPIRATORY (INHALATION)
Qty: 8.5 G | Refills: 0 | Status: SHIPPED | OUTPATIENT
Start: 2023-01-06

## 2023-02-09 ENCOUNTER — DOCUMENTATION (OUTPATIENT)
Dept: FAMILY MEDICINE CLINIC | Facility: CLINIC | Age: 28
End: 2023-02-09

## 2023-02-10 NOTE — PROGRESS NOTES
I noted the abnormal TSH and T4 on 10/13/2022, and I forwarded the result that day to the provider patient was going to see in office on 10/17/2022  It appeared that pt wasn't able to make it to the appt  After reviewing the patient's chart today, I'm not sure if she had a chance to discuss the results with any provider  I tried calling her, but the phone number is no longer in service  I have sent a message to the patient via [employer] to contact us

## 2023-03-13 DIAGNOSIS — R09.82 POST-NASAL DRIP: ICD-10-CM

## 2023-03-13 RX ORDER — LORATADINE 10 MG/1
TABLET ORAL
Qty: 30 TABLET | Refills: 1 | Status: SHIPPED | OUTPATIENT
Start: 2023-03-13

## 2023-04-28 DIAGNOSIS — F41.8 DEPRESSION WITH ANXIETY: ICD-10-CM

## 2023-04-28 DIAGNOSIS — E03.9 HYPOTHYROIDISM, UNSPECIFIED TYPE: Primary | ICD-10-CM

## 2023-04-28 DIAGNOSIS — R09.82 POST-NASAL DRIP: ICD-10-CM

## 2023-04-28 RX ORDER — LORATADINE 10 MG/1
TABLET ORAL
Qty: 30 TABLET | Refills: 0 | Status: SHIPPED | OUTPATIENT
Start: 2023-04-28

## 2023-04-28 RX ORDER — ESCITALOPRAM OXALATE 20 MG/1
TABLET ORAL
Qty: 30 TABLET | Refills: 0 | Status: SHIPPED | OUTPATIENT
Start: 2023-04-28

## 2023-05-12 ENCOUNTER — OFFICE VISIT (OUTPATIENT)
Dept: URGENT CARE | Facility: CLINIC | Age: 28
End: 2023-05-12

## 2023-05-12 VITALS
BODY MASS INDEX: 34.15 KG/M2 | TEMPERATURE: 98.8 F | DIASTOLIC BLOOD PRESSURE: 78 MMHG | SYSTOLIC BLOOD PRESSURE: 125 MMHG | RESPIRATION RATE: 20 BRPM | WEIGHT: 200 LBS | HEIGHT: 64 IN | OXYGEN SATURATION: 99 % | HEART RATE: 97 BPM

## 2023-05-12 DIAGNOSIS — J06.9 VIRAL URI WITH COUGH: Primary | ICD-10-CM

## 2023-05-12 LAB — S PYO AG THROAT QL: NEGATIVE

## 2023-05-12 RX ORDER — FLUTICASONE PROPIONATE 50 MCG
1 SPRAY, SUSPENSION (ML) NASAL DAILY
Qty: 18.2 ML | Refills: 0 | Status: SHIPPED | OUTPATIENT
Start: 2023-05-12

## 2023-05-12 RX ORDER — BROMPHENIRAMINE MALEATE, PSEUDOEPHEDRINE HYDROCHLORIDE, AND DEXTROMETHORPHAN HYDROBROMIDE 2; 30; 10 MG/5ML; MG/5ML; MG/5ML
5 SYRUP ORAL 4 TIMES DAILY PRN
Qty: 118 ML | Refills: 0 | Status: SHIPPED | OUTPATIENT
Start: 2023-05-12

## 2023-05-12 NOTE — LETTER
May 12, 2023     Patient: Robyn Muñoz   YOB: 1995   Date of Visit: 5/12/2023       To Whom It May Concern: It is my medical opinion that Robyn Muñoz may return to work on 05/13/2023  If you have any questions or concerns, please don't hesitate to call           Sincerely,        Reg Dumont PA-C    CC: No Recipients

## 2023-05-12 NOTE — PATIENT INSTRUCTIONS
Rapid strep negative  Discussed symptoms are most likely viral in nature  To take prescribed Bromfed and Flonase as instructed  Also discussed importance of maintaining adequate fluid hydration  Also discussed cutting back on cigarette smoking while symptoms are present

## 2023-05-12 NOTE — PROGRESS NOTES
[facility]        NAME: Melissa Michele is a 32 y o  female  : 1995    MRN: [id number]  DATE: May 12, 2023  TIME: 1:45 PM    Assessment and Plan   Viral URI with cough [J06 9]  1  Viral URI with cough  POCT rapid strepA    fluticasone (FLONASE) 50 mcg/act nasal spray    brompheniramine-pseudoephedrine-DM 30-2-10 MG/5ML syrup        Rapid strep negative, per Centor score no further testing needed at this time    Patient Instructions     Patient Instructions   Rapid strep negative  Discussed symptoms are most likely viral in nature  To take prescribed Bromfed and Flonase as instructed  Also discussed importance of maintaining adequate fluid hydration  Also discussed cutting back on cigarette smoking while symptoms are present  Follow up with PCP in 3-5 days  Proceed to  ER if symptoms worsen  Chief Complaint     Chief Complaint   Patient presents with   • Cold Like Symptoms     URi s/s x 1 day ago with cough and congestion  History of Present Illness       Patient is a 68-year-old female presenting today with cold symptoms x1 day  Patient notes over the last 24 hours or so she has been experiencing some nasal congestion, postnasal drip, sore throat and a cough, has not taken any medication alleviating factors for symptoms, notes that her daughter was recently diagnosed with strep throat, expresses concern of possible infection  Denies fever, chills, chest tightness, SOB, N/V/D, trouble swallowing  Review of Systems   Review of Systems   Constitutional: Negative for chills, fatigue and fever  HENT: Positive for congestion, postnasal drip and sore throat  Eyes: Negative for redness and itching  Respiratory: Positive for cough  Negative for chest tightness and shortness of breath  Cardiovascular: Negative for chest pain  Gastrointestinal: Negative for diarrhea, nausea and vomiting  Musculoskeletal: Negative for arthralgias and myalgias     Neurological: Negative for light-headedness and headaches           Current Medications       Current Outpatient Medications:   •  brompheniramine-pseudoephedrine-DM 30-2-10 MG/5ML syrup, Take 5 mL by mouth 4 (four) times a day as needed for allergies, Disp: 118 mL, Rfl: 0  •  fluticasone (FLONASE) 50 mcg/act nasal spray, 1 spray into each nostril daily, Disp: 18 2 mL, Rfl: 0  •  albuterol (PROVENTIL HFA,VENTOLIN HFA) 90 mcg/act inhaler, INHALE TWO PUFFS BY MOUTH EVERY 6 HOURS AS NEEDED FOR WHEEZING, Disp: 8 5 g, Rfl: 0  •  escitalopram (LEXAPRO) 20 mg tablet, TAKE ONE TABLET BY MOUTH EVERY DAY, Disp: 30 tablet, Rfl: 0  •  levothyroxine 150 mcg tablet, TAKE ONE TABLET BY MOUTH EVERY DAY, Disp: 90 tablet, Rfl: 1  •  loratadine (CLARITIN) 10 mg tablet, TAKE ONE TABLET BY MOUTH EVERY DAY, Disp: 30 tablet, Rfl: 0  •  naproxen (Naprosyn) 500 mg tablet, Take 1 tablet (500 mg total) by mouth 2 (two) times a day with meals, Disp: 28 tablet, Rfl: 0  •  pantoprazole (PROTONIX) 40 mg tablet, Take 1 tablet (40 mg total) by mouth daily, Disp: 90 tablet, Rfl: 3    Current Allergies     Allergies as of 05/12/2023 - Reviewed 05/12/2023   Allergen Reaction Noted   • Ciprofloxacin Other (See Comments) 01/14/2016            The following portions of the patient's history were reviewed and updated as appropriate: allergies, current medications, past family history, past medical history, past social history, past surgical history and problem list      Past Medical History:   Diagnosis Date   • Abdominal pain    • Altered bowel habits     constipation or diarrhea   • Anxiety    • Asthma    • Back pain    • Cellulitis     Resolved 12/1/2014    • Contact lens/glasses fitting    • Cryptic tonsil     Last assessed 8/6/2014    • Disease of thyroid gland     hypothyroid   • GERD (gastroesophageal reflux disease)    • Hand paresthesia     Last assessed 8/5/2016    • Heel pain 9/12/2016   • Heel pain, bilateral     left heel injected 9/28/17   • Kidney stone "  • Loss of appetite     Resolved 2017    • Normal vaginal delivery 2020       Past Surgical History:   Procedure Laterality Date   • COLONOSCOPY N/A 10/2/2017    Procedure: COLONOSCOPY;  Surgeon: Rashid Hutchinson MD;  Location: [place] [address] GI LAB; Service: Gastroenterology   • ESOPHAGOGASTRODUODENOSCOPY N/A 10/2/2017    Procedure: ESOPHAGOGASTRODUODENOSCOPY (EGD); Surgeon: Rashid Hutchinson MD;  Location: [facility] GI [facility]; Service: Gastroenterology   • INDUCED      • KNEE ARTHROSCOPY Left     cartilage shaving   • UPPER GASTROINTESTINAL ENDOSCOPY         Family History   Problem Relation Age of Onset   • Anxiety disorder Mother    • Thyroid disease unspecified Mother         over or under active not sure   • Colon polyps Mother 46        Precancerous colon polyps removed   • Hypothyroidism Sister    • Asthma Sister    • Cancer Family    • Vision loss Maternal Uncle          Medications have been verified  Objective   /78   Pulse 97   Temp 98 8 °F (37 1 °C)   Resp 20   Ht 5' 4\" (1 626 m)   Wt 90 7 kg (200 lb)   SpO2 99%   BMI 34 33 kg/m²        Physical Exam     Physical Exam  Vitals and nursing note reviewed  Constitutional:       General: She is not in acute distress  Appearance: Normal appearance  HENT:      Head: Normocephalic  Right Ear: Tympanic membrane, ear canal and external ear normal       Left Ear: Tympanic membrane, ear canal and external ear normal       Nose: Nose normal       Mouth/Throat:      Mouth: Mucous membranes are moist       Pharynx: Oropharynx is clear  Eyes:      Conjunctiva/sclera: Conjunctivae normal    Cardiovascular:      Rate and Rhythm: Normal rate and regular rhythm  Pulses: Normal pulses  Heart sounds: Normal heart sounds  Pulmonary:      Effort: Pulmonary effort is normal       Breath sounds: Normal breath sounds  Skin:     General: Skin is warm  Capillary Refill: Capillary refill takes less than 2 seconds   " Neurological:      Mental Status: She is alert

## 2023-05-14 ENCOUNTER — APPOINTMENT (EMERGENCY)
Dept: RADIOLOGY | Facility: HOSPITAL | Age: 28
End: 2023-05-14

## 2023-05-14 ENCOUNTER — HOSPITAL ENCOUNTER (EMERGENCY)
Facility: HOSPITAL | Age: 28
Discharge: HOME | End: 2023-05-15
Attending: EMERGENCY MEDICINE

## 2023-05-14 DIAGNOSIS — J40 BRONCHITIS: Primary | ICD-10-CM

## 2023-05-14 RX ORDER — IPRATROPIUM BROMIDE AND ALBUTEROL SULFATE 2.5; .5 MG/3ML; MG/3ML
3 SOLUTION RESPIRATORY (INHALATION) ONCE
Status: COMPLETED | OUTPATIENT
Start: 2023-05-14 | End: 2023-05-14

## 2023-05-14 RX ADMIN — IPRATROPIUM BROMIDE AND ALBUTEROL SULFATE 3 ML: .5; 3 SOLUTION RESPIRATORY (INHALATION) at 23:33

## 2023-05-14 NOTE — Clinical Note
Lubna Tovar was seen and treated in our emergency department on 5/14/2023  Diagnosis:     Kimberley Pichardo  may return to work on return date  She may return on this date: 05/18/2023         If you have any questions or concerns, please don't hesitate to call        Ariel Regalado DO    ______________________________           _______________          _______________  Hospital Representative                              Date                                Time

## 2023-05-15 VITALS
SYSTOLIC BLOOD PRESSURE: 132 MMHG | RESPIRATION RATE: 20 BRPM | OXYGEN SATURATION: 98 % | TEMPERATURE: 99.4 F | DIASTOLIC BLOOD PRESSURE: 70 MMHG | HEART RATE: 113 BPM

## 2023-05-15 RX ORDER — PREDNISONE 10 MG/1
20 TABLET ORAL DAILY
Qty: 10 TABLET | Refills: 0 | Status: SHIPPED | OUTPATIENT
Start: 2023-05-15 | End: 2023-05-20

## 2023-05-15 RX ORDER — PREDNISONE 20 MG/1
40 TABLET ORAL ONCE
Status: COMPLETED | OUTPATIENT
Start: 2023-05-15 | End: 2023-05-15

## 2023-05-15 RX ORDER — AZITHROMYCIN 250 MG/1
500 TABLET, FILM COATED ORAL ONCE
Status: COMPLETED | OUTPATIENT
Start: 2023-05-15 | End: 2023-05-15

## 2023-05-15 RX ORDER — AZITHROMYCIN 250 MG/1
TABLET, FILM COATED ORAL
Qty: 6 TABLET | Refills: 0 | Status: SHIPPED | OUTPATIENT
Start: 2023-05-15 | End: 2023-05-19

## 2023-05-15 RX ADMIN — AZITHROMYCIN MONOHYDRATE 500 MG: 250 TABLET ORAL at 01:21

## 2023-05-15 RX ADMIN — PREDNISONE 40 MG: 20 TABLET ORAL at 01:20

## 2023-05-16 NOTE — ED PROVIDER NOTES
History  Chief Complaint   Patient presents with   • Cough     Cough for about 3 days  Seen two days ago at urgent care had neg strep screen being treated for allergies  Given decongestant and flonase, feels worse now burning in chest     22-year-old female presents with cough and congestion for about 3 days states she was seen 2 days ago urgent care had negative strep screen and negative COVID she has been given decongestant and Flonase feels worse now some burning in her chest denies any fevers her temperature was 99 4 heart rate 113 no shortness of breath with this states that she just keeps coughing  Prior to Admission Medications   Prescriptions Last Dose Informant Patient Reported? Taking?    albuterol (PROVENTIL HFA,VENTOLIN HFA) 90 mcg/act inhaler   No No   Sig: INHALE TWO PUFFS BY MOUTH EVERY 6 HOURS AS NEEDED FOR WHEEZING   brompheniramine-pseudoephedrine-DM 30-2-10 MG/5ML syrup   No No   Sig: Take 5 mL by mouth 4 (four) times a day as needed for allergies   escitalopram (LEXAPRO) 20 mg tablet   No No   Sig: TAKE ONE TABLET BY MOUTH EVERY DAY   fluticasone (FLONASE) 50 mcg/act nasal spray   No No   Si spray into each nostril daily   levothyroxine 150 mcg tablet   No No   Sig: TAKE ONE TABLET BY MOUTH EVERY DAY   loratadine (CLARITIN) 10 mg tablet   No No   Sig: TAKE ONE TABLET BY MOUTH EVERY DAY   naproxen (Naprosyn) 500 mg tablet   No No   Sig: Take 1 tablet (500 mg total) by mouth 2 (two) times a day with meals   pantoprazole (PROTONIX) 40 mg tablet   No No   Sig: Take 1 tablet (40 mg total) by mouth daily      Facility-Administered Medications: None       Past Medical History:   Diagnosis Date   • Abdominal pain    • Altered bowel habits     constipation or diarrhea   • Anxiety    • Asthma    • Back pain    • Cellulitis     Resolved 2014    • Contact lens/glasses fitting    • Cryptic tonsil     Last assessed 2014    • Disease of thyroid gland     hypothyroid   • GERD (gastroesophageal reflux disease)    • Hand paresthesia     Last assessed 2016    • Heel pain 2016   • Heel pain, bilateral     left heel injected 17   • Kidney stone    • Loss of appetite     Resolved 2017    • Normal vaginal delivery 2020       Past Surgical History:   Procedure Laterality Date   • COLONOSCOPY N/A 10/2/2017    Procedure: COLONOSCOPY;  Surgeon: Rupert Morris MD;  Location: [facility] GI LAB; Service: Gastroenterology   • ESOPHAGOGASTRODUODENOSCOPY N/A 10/2/2017    Procedure: ESOPHAGOGASTRODUODENOSCOPY (EGD); Surgeon: Rupert Morris MD;  Location: [facility]; Service: Gastroenterology   • INDUCED      • KNEE ARTHROSCOPY Left     cartilage shaving   • UPPER GASTROINTESTINAL ENDOSCOPY         Family History   Problem Relation Age of Onset   • Anxiety disorder Mother    • Thyroid disease unspecified Mother         over or under active not sure   • Colon polyps Mother 46        Precancerous colon polyps removed   • Hypothyroidism Sister    • Asthma Sister    • Cancer Family    • Vision loss Maternal Uncle      I have reviewed and agree with the history as documented  E-Cigarette/Vaping   • E-Cigarette Use Never User      E-Cigarette/Vaping Substances   • Nicotine No    • THC No    • CBD No    • Flavoring No    • Other No    • Unknown No      Social History     Tobacco Use   • Smoking status: Every Day     Packs/day: 0 50     Years: 13 00     Pack years: 6 50     Types: Cigarettes   • Smokeless tobacco: Never   Vaping Use   • Vaping Use: Never used   Substance Use Topics   • Alcohol use: Not Currently   • Drug use: Yes     Frequency: 7 0 times per week     Types: Marijuana     Comment: marijuana helps THC 21 1800       Review of Systems   Constitutional: Negative for activity change, chills, diaphoresis and fever  HENT: Negative for congestion, ear pain, nosebleeds, sore throat, trouble swallowing and voice change      Eyes: Negative for pain, discharge and redness  Respiratory: Positive for cough  Negative for apnea, choking, shortness of breath, wheezing and stridor  Cardiovascular: Negative for chest pain and palpitations  Gastrointestinal: Negative for abdominal distention, abdominal pain, constipation, diarrhea, nausea and vomiting  Endocrine: Negative for polydipsia  Genitourinary: Negative for difficulty urinating, dysuria, flank pain, frequency, hematuria and urgency  Musculoskeletal: Negative for back pain, gait problem, joint swelling, myalgias, neck pain and neck stiffness  Skin: Negative for pallor and rash  Neurological: Negative for dizziness, tremors, syncope, speech difficulty, weakness, numbness and headaches  Hematological: Negative for adenopathy  Psychiatric/Behavioral: Negative for confusion, hallucinations, self-injury and suicidal ideas  The patient is not nervous/anxious  Physical Exam  Physical Exam  Vitals and nursing note reviewed  Constitutional:       General: She is not in acute distress  Appearance: She is well-developed  She is not diaphoretic  HENT:      Head: Normocephalic and atraumatic  Right Ear: External ear normal       Left Ear: External ear normal       Nose: Nose normal    Eyes:      Conjunctiva/sclera: Conjunctivae normal       Pupils: Pupils are equal, round, and reactive to light  Cardiovascular:      Rate and Rhythm: Normal rate and regular rhythm  Heart sounds: Normal heart sounds  Pulmonary:      Effort: Pulmonary effort is normal       Breath sounds: Normal breath sounds  Comments: Patient having cough and some wheezing bilaterally  No other complaints patient did a little bit better after she received DuoNeb and steroid  Abdominal:      General: Bowel sounds are normal       Palpations: Abdomen is soft  Musculoskeletal:         General: Normal range of motion  Cervical back: Normal range of motion and neck supple  Skin:     General: Skin is warm and dry  Neurological:      Mental Status: She is alert and oriented to person, place, and time  Deep Tendon Reflexes: Reflexes are normal and symmetric  Psychiatric:         Behavior: Behavior is cooperative  Vital Signs  ED Triage Vitals [05/14/23 2214]   Temperature Pulse Respirations Blood Pressure SpO2   99 4 °F (37 4 °C) (!) 109 22 145/82 100 %      Temp Source Heart Rate Source Patient Position - Orthostatic VS BP Location FiO2 (%)   Tympanic Monitor Sitting Right arm --      Pain Score       4           Vitals:    05/14/23 2214 05/15/23 0121   BP: 145/82 132/70   Pulse: (!) 109 (!) 113   Patient Position - Orthostatic VS: Sitting Sitting         Visual Acuity      ED Medications  Medications   ipratropium-albuterol (DUO-NEB) 0 5-2 5 mg/3 mL inhalation solution 3 mL (3 mL Nebulization Given 5/14/23 2333)   azithromycin (ZITHROMAX) tablet 500 mg (500 mg Oral Given 5/15/23 0121)   predniSONE tablet 40 mg (40 mg Oral Given 5/15/23 0120)       Diagnostic Studies  Results Reviewed     None                 XR chest 1 view portable   Final Result by Amrit Hollingsworth MD (05/15 1225)      No acute cardiopulmonary disease  Findings are stable            Workstation performed: [id number]                    Procedures  Procedures         ED Course                                             MDM    Disposition  Final diagnoses:   Bronchitis     Time reflects when diagnosis was documented in both MDM as applicable and the Disposition within this note     Time User Action Codes Description Comment    5/15/2023  1:08 AM Sury Salgado Add [J40] Bronchitis       ED Disposition     ED Disposition   Discharge    Condition   Stable    Date/Time   Mon May 15, 2023  1:08 AM    Alonso Herr discharge to home/self care                 Follow-up Information     Follow up With Specialties Details Why Contact Info Additional Information    [address] [facility] Emergency Department Emergency Medicine As needed [address] [address]  [address] [address] Emergency Department, Rachel Rodriguez, Jens quinones, [id number]          Discharge Medication List as of 5/15/2023  1:10 AM      START taking these medications    Details   azithromycin (Zithromax Z-Fransico) 250 mg tablet Take 2 tablets today then 1 tablet daily x 4 days, Normal      predniSONE 10 mg tablet Take 2 tablets (20 mg total) by mouth daily for 5 days, Starting Mon 5/15/2023, Until Sat 5/20/2023, Normal         CONTINUE these medications which have NOT CHANGED    Details   albuterol (PROVENTIL HFA,VENTOLIN HFA) 90 mcg/act inhaler INHALE TWO PUFFS BY MOUTH EVERY 6 HOURS AS NEEDED FOR WHEEZING, Normal      brompheniramine-pseudoephedrine-DM 30-2-10 MG/5ML syrup Take 5 mL by mouth 4 (four) times a day as needed for allergies, Starting Fri 5/12/2023, Normal      escitalopram (LEXAPRO) 20 mg tablet TAKE ONE TABLET BY MOUTH EVERY DAY, Normal      fluticasone (FLONASE) 50 mcg/act nasal spray 1 spray into each nostril daily, Starting Fri 5/12/2023, Normal      levothyroxine 150 mcg tablet TAKE ONE TABLET BY MOUTH EVERY DAY, Normal      loratadine (CLARITIN) 10 mg tablet TAKE ONE TABLET BY MOUTH EVERY DAY, Normal      naproxen (Naprosyn) 500 mg tablet Take 1 tablet (500 mg total) by mouth 2 (two) times a day with meals, Starting Wed 8/17/2022, Normal      pantoprazole (PROTONIX) 40 mg tablet Take 1 tablet (40 mg total) by mouth daily, Starting Mon 5/9/2022, Normal             No discharge procedures on file      PDMP Review     None          ED Provider  Electronically Signed by           Ariel Regalado DO  05/16/23 3983

## 2023-08-02 DIAGNOSIS — E03.9 HYPOTHYROIDISM, UNSPECIFIED TYPE: ICD-10-CM

## 2023-08-02 DIAGNOSIS — K29.70 GASTRITIS, PRESENCE OF BLEEDING UNSPECIFIED, UNSPECIFIED CHRONICITY, UNSPECIFIED GASTRITIS TYPE: ICD-10-CM

## 2023-08-02 DIAGNOSIS — R09.82 POST-NASAL DRIP: ICD-10-CM

## 2023-08-02 DIAGNOSIS — F41.8 DEPRESSION WITH ANXIETY: ICD-10-CM

## 2023-08-02 RX ORDER — PANTOPRAZOLE SODIUM 40 MG/1
40 TABLET, DELAYED RELEASE ORAL DAILY
Qty: 30 TABLET | Refills: 0 | Status: SHIPPED | OUTPATIENT
Start: 2023-08-02

## 2023-08-02 NOTE — TELEPHONE ENCOUNTER
Please review [FreeTextEntry1] : Results from previous exam reviewed with patient.\par Discussed pre-diabetes and associated risks.\par Discussed dietary counseling and lifestyle modifications. Pt. verbalized understanding and will implement necessary changes.\par RTC on 7/7/21 for repeat HgbA1c.

## 2023-08-10 RX ORDER — LORATADINE 10 MG/1
TABLET ORAL
Qty: 30 TABLET | Refills: 0 | Status: SHIPPED | OUTPATIENT
Start: 2023-08-10

## 2023-08-10 RX ORDER — LEVOTHYROXINE SODIUM 0.15 MG/1
TABLET ORAL
Qty: 30 TABLET | Refills: 0 | Status: SHIPPED | OUTPATIENT
Start: 2023-08-10

## 2023-08-10 RX ORDER — ESCITALOPRAM OXALATE 20 MG/1
TABLET ORAL
Qty: 30 TABLET | Refills: 0 | Status: SHIPPED | OUTPATIENT
Start: 2023-08-10

## 2023-08-10 NOTE — TELEPHONE ENCOUNTER
Please schedule [facility] for hypothyroid in 1-2 weeks. Will likely need dose adjustment. Please have patient complete TSH blood work prior to the appt. Refilled medication for 30 days.

## 2023-11-11 ENCOUNTER — APPOINTMENT (EMERGENCY)
Dept: RADIOLOGY | Facility: HOSPITAL | Age: 28
End: 2023-11-11
Payer: COMMERCIAL

## 2023-11-11 ENCOUNTER — HOSPITAL ENCOUNTER (EMERGENCY)
Facility: HOSPITAL | Age: 28
Discharge: HOME | End: 2023-11-11
Attending: EMERGENCY MEDICINE
Payer: COMMERCIAL

## 2023-11-11 VITALS
HEART RATE: 87 BPM | RESPIRATION RATE: 18 BRPM | DIASTOLIC BLOOD PRESSURE: 77 MMHG | SYSTOLIC BLOOD PRESSURE: 122 MMHG | BODY MASS INDEX: 32.68 KG/M2 | WEIGHT: 190.4 LBS | TEMPERATURE: 97.8 F | OXYGEN SATURATION: 97 %

## 2023-11-11 DIAGNOSIS — N73.9 PELVIC INFLAMMATORY DISEASE: Primary | ICD-10-CM

## 2023-11-11 LAB
BACTERIA UR QL AUTO: ABNORMAL /HPF
BILIRUB UR QL STRIP: NEGATIVE
CLARITY UR: CLEAR
COLOR UR: YELLOW
EXT PREGNANCY TEST URINE: NEGATIVE
EXT. CONTROL: NORMAL
GLUCOSE UR STRIP-MCNC: NEGATIVE MG/DL
HGB UR QL STRIP.AUTO: NEGATIVE
KETONES UR STRIP-MCNC: NEGATIVE MG/DL
LEUKOCYTE ESTERASE UR QL STRIP: ABNORMAL
NITRITE UR QL STRIP: NEGATIVE
NON-SQ EPI CELLS URNS QL MICRO: ABNORMAL /HPF
PH UR STRIP.AUTO: 5.5 [PH]
PROT UR STRIP-MCNC: NEGATIVE MG/DL
RBC #/AREA URNS AUTO: ABNORMAL /HPF
SP GR UR STRIP.AUTO: 1.01 (ref 1–1.03)
UROBILINOGEN UR QL STRIP.AUTO: 0.2 E.U./DL
WBC #/AREA URNS AUTO: ABNORMAL /HPF

## 2023-11-11 PROCEDURE — 81025 URINE PREGNANCY TEST: CPT | Performed by: EMERGENCY MEDICINE

## 2023-11-11 PROCEDURE — 99284 EMERGENCY DEPT VISIT MOD MDM: CPT | Performed by: EMERGENCY MEDICINE

## 2023-11-11 PROCEDURE — 96372 THER/PROPH/DIAG INJ SC/IM: CPT

## 2023-11-11 PROCEDURE — 87491 CHLMYD TRACH DNA AMP PROBE: CPT | Performed by: EMERGENCY MEDICINE

## 2023-11-11 PROCEDURE — 87591 N.GONORRHOEAE DNA AMP PROB: CPT | Performed by: EMERGENCY MEDICINE

## 2023-11-11 PROCEDURE — 99284 EMERGENCY DEPT VISIT MOD MDM: CPT

## 2023-11-11 PROCEDURE — 74176 CT ABD & PELVIS W/O CONTRAST: CPT

## 2023-11-11 PROCEDURE — 81001 URINALYSIS AUTO W/SCOPE: CPT | Performed by: EMERGENCY MEDICINE

## 2023-11-11 RX ORDER — FLUCONAZOLE 100 MG/1
200 TABLET ORAL ONCE
Status: COMPLETED | OUTPATIENT
Start: 2023-11-11 | End: 2023-11-11

## 2023-11-11 RX ORDER — DOXYCYCLINE HYCLATE 100 MG/1
100 CAPSULE ORAL ONCE
Status: COMPLETED | OUTPATIENT
Start: 2023-11-11 | End: 2023-11-11

## 2023-11-11 RX ORDER — DOXYCYCLINE HYCLATE 100 MG/1
100 CAPSULE ORAL 2 TIMES DAILY
Qty: 28 CAPSULE | Refills: 0 | Status: SHIPPED | OUTPATIENT
Start: 2023-11-11 | End: 2023-11-25

## 2023-11-11 RX ADMIN — DOXYCYCLINE 100 MG: 100 CAPSULE ORAL at 11:46

## 2023-11-11 RX ADMIN — LIDOCAINE HYDROCHLORIDE 500 MG: 10 INJECTION, SOLUTION EPIDURAL; INFILTRATION; INTRACAUDAL; PERINEURAL at 11:44

## 2023-11-11 RX ADMIN — FLUCONAZOLE 200 MG: 100 TABLET ORAL at 11:27

## 2023-11-11 NOTE — DISCHARGE INSTRUCTIONS
Your presentation is concerning for pelvic inflammatory disease, type of sexually transmitted infection. We have treated you with antibiotics in the emergency department and started you on a course of antibiotics to go home with. We recommend that he complete the full 2-week course of antibiotics prior to resuming sexual activity. We will call with any positive results of gonorrhea/chlamydia testing. If you test positive, we recommend that your sexual partners are tested and treated if positive. If you have any severe worsening of pain, fevers, chills, persistent nausea and vomiting, heavy vaginal bleeding as evidenced by 2 soaked pads per hour for 2 consecutive hours, return to the emergency department.

## 2023-11-11 NOTE — ED PROVIDER NOTES
History  Chief Complaint   Patient presents with    Flank Pain     Reported of UTI symptoms a week ago, pain with urination. Now pain shooting to LLQ and pain to L flank. No fever. HPI  Patient is a 78-year-old female history of nephrolithiasis presenting for evaluation of dysuria, urinary frequency, left lower quadrant pain with radiation to the left flank. Patient states symptoms for started with dysuria about a week and a half ago, progressed to urinary frequency, states about a week of lower abdominal pain which has worsened over the course of the past few days with radiation into the left flank. Patient denies associated nausea, vomiting, fevers, chills, rigors. Patient denies hematuria or difficulty urinating. Patient states over the same interval she has had thick white vaginal discharge. Patient states that she is sexually active with 3 partners and consistently uses barrier contraception. Patient states all prior kidney stones have passed without intervention and that she has had 6 in the past.  Prior to Admission Medications   Prescriptions Last Dose Informant Patient Reported? Taking?    albuterol (PROVENTIL HFA,VENTOLIN HFA) 90 mcg/act inhaler   No No   Sig: INHALE TWO PUFFS BY MOUTH EVERY 6 HOURS AS NEEDED FOR WHEEZING   escitalopram (LEXAPRO) 20 mg tablet   No No   Sig: TAKE ONE TABLET BY MOUTH EVERY DAY   levothyroxine 150 mcg tablet   No No   Sig: TAKE ONE TABLET BY MOUTH EVERY DAY   loratadine (CLARITIN) 10 mg tablet   No No   Sig: TAKE ONE TABLET BY MOUTH EVERY DAY   pantoprazole (PROTONIX) 40 mg tablet   No No   Sig: TAKE ONE TABLET BY MOUTH EVERY DAY      Facility-Administered Medications: None       Past Medical History:   Diagnosis Date    Abdominal pain     Altered bowel habits     constipation or diarrhea    Anxiety     Asthma     Back pain     Cellulitis     Resolved 12/1/2014     Contact lens/glasses fitting     Cryptic tonsil     Last assessed 8/6/2014     Disease of thyroid gland     hypothyroid    GERD (gastroesophageal reflux disease)     Hand paresthesia     Last assessed 2016     Heel pain 2016    Heel pain, bilateral     left heel injected 17    Kidney stone     Loss of appetite     Resolved 2017     Normal vaginal delivery 2020       Past Surgical History:   Procedure Laterality Date    COLONOSCOPY N/A 10/2/2017    Procedure: COLONOSCOPY;  Surgeon: Chasity Curran MD;  Location: [address] [place] [address] [facility]; Service: Gastroenterology    ESOPHAGOGASTRODUODENOSCOPY N/A 10/2/2017    Procedure: ESOPHAGOGASTRODUODENOSCOPY (EGD); Surgeon: Chasity Curran MD;  Location: [facility]; Service: Gastroenterology    INDUCED       KNEE ARTHROSCOPY Left     cartilage shaving    UPPER GASTROINTESTINAL ENDOSCOPY         Family History   Problem Relation Age of Onset    Anxiety disorder Mother     Thyroid disease unspecified Mother         over or under active not sure    Colon polyps Mother 46        Precancerous colon polyps removed    Hypothyroidism Sister     Asthma Sister     Cancer Family     Vision loss Maternal Uncle      I have reviewed and agree with the history as documented. E-Cigarette/Vaping    E-Cigarette Use Never User      E-Cigarette/Vaping Substances    Nicotine No     THC No     CBD No     Flavoring No     Other No     Unknown No      Social History     Tobacco Use    Smoking status: Every Day     Packs/day: 0.50     Years: 13.00     Total pack years: 6.50     Types: Cigarettes    Smokeless tobacco: Never   Vaping Use    Vaping Use: Never used   Substance Use Topics    Alcohol use: Not Currently    Drug use: Yes     Frequency: 7.0 times per week     Types: Marijuana     Comment: marijuana helps THC 21 1800       Review of Systems   Constitutional:  Negative for chills, fatigue and fever. Gastrointestinal:  Negative for diarrhea, nausea and vomiting. Genitourinary:  Positive for dysuria, flank pain, frequency, pelvic pain and vaginal discharge. Negative for decreased urine volume, menstrual problem, vaginal bleeding and vaginal pain. Musculoskeletal:  Negative for arthralgias and myalgias. Neurological:  Negative for headaches. Psychiatric/Behavioral:  Negative for confusion. All other systems reviewed and are negative. Physical Exam  Physical Exam  Vitals and nursing note reviewed. Constitutional:       General: She is not in acute distress. Appearance: Normal appearance. She is not ill-appearing, toxic-appearing or diaphoretic. Comments: Well-appearing, nontoxic, nondistressed   HENT:      Head: Normocephalic and atraumatic. Comments: Moist mucous membranes     Right Ear: External ear normal.      Left Ear: External ear normal.   Eyes:      General:         Right eye: No discharge. Left eye: No discharge. Cardiovascular:      Comments: Regular rate and rhythm, no murmurs rubs or gallops. Extremities warm and well-perfused without mottling. Pulmonary:      Effort: No respiratory distress. Comments: No increased work of breathing. Speaking in complete sentences. Lungs clear to auscultation bilaterally without wheezes, rales, rhonchi. Satting 97% on room air indicating adequate oxygenation. Abdominal:      General: There is no distension. Tenderness: There is abdominal tenderness. Comments: Suprapubic and left lower quadrant abdominal tenderness without rigidity, rebound, guarding. Abdomen nondistended with normal bowel sounds. Musculoskeletal:         General: No deformity. Cervical back: Normal range of motion. Skin:     Findings: No lesion or rash. Neurological:      Mental Status: She is alert and oriented to person, place, and time. Mental status is at baseline.       Comments: Awake, alert, pleasant, interactive   Psychiatric:         Mood and Affect: Mood and affect normal.         Vital Signs  ED Triage Vitals [11/11/23 1029]   Temperature Pulse Respirations Blood Pressure SpO2 97.8 °F (36.6 °C) 87 18 122/77 97 %      Temp Source Heart Rate Source Patient Position - Orthostatic VS BP Location FiO2 (%)   Oral Monitor Sitting Right arm --      Pain Score       4           Vitals:    11/11/23 1029   BP: 122/77   Pulse: 87   Patient Position - Orthostatic VS: Sitting         Visual Acuity      ED Medications  Medications   cefTRIAXone (ROCEPHIN) 500 mg in lidocaine (PF) (XYLOCAINE-MPF) 1 % IM only syringe (has no administration in time range)   doxycycline hyclate (VIBRAMYCIN) capsule 100 mg (has no administration in time range)   fluconazole (DIFLUCAN) tablet 200 mg (200 mg Oral Given 11/11/23 1127)       Diagnostic Studies  Results Reviewed       Procedure Component Value Units Date/Time    Chlamydia/GC amplified DNA by PCR [[id number]]     Lab Status: No result     Urinalysis with microscopic [[id number]]  (Abnormal) Collected: 11/11/23 1033    Lab Status: Final result Specimen: Urine, Clean Catch Updated: 11/11/23 1109     Color, UA Yellow     Clarity, UA Clear     Specific Gravity, UA 1.010     pH, UA 5.5     Leukocytes, UA Trace     Nitrite, UA Negative     Protein, UA Negative mg/dl      Glucose, UA Negative mg/dl      Ketones, UA Negative mg/dl      Urobilinogen, UA 0.2 E.U./dl      Bilirubin, UA Negative     Occult Blood, UA Negative     RBC, UA None Seen /hpf      WBC, UA None Seen /hpf      Epithelial Cells Occasional /hpf      Bacteria, UA Occasional /hpf     Chlamydia/GC amplified DNA by PCR [[id number]] Collected: 11/11/23 1058    Lab Status: In process Specimen: Urine, Other Updated: 11/11/23 1059    POCT pregnancy, urine [[id number]]  (Normal) Resulted: 11/11/23 1036    Lab Status: Final result Updated: 11/11/23 1036     EXT Preg Test, Ur Negative     Control Valid                   CT renal stone study abdomen pelvis wo contrast   Final Result by Ceci Riojas MD (11/11 1116)      Nonobstructing 2 mm left-sided intrarenal calculi without hydronephrosis.       No acute inflammatory process identified in the abdomen or pelvis. Workstation performed: [id number]                    Procedures  Procedures         ED Course                                             Medical Decision Making  I obtained history from the patient. I reviewed external medical documentation. Differential diagnosis includes was not limited to cystitis/pyelonephritis, nephrolithiasis, given patient's sexual history pelvic inflammatory disease. I ordered and reviewed a urine pregnancy test which was negative as well as urinalysis which showed trace leukocytes. On pelvic exam patient with large amount of thick white vaginal discharge. Treated empirically with fluconazole, Rocephin, doxycycline. Gonorrhea/chlamydia vaginal swab sent. Patient instructed to refrain from sexual activity until completing course of antibiotics, discharged with return precautions. Amount and/or Complexity of Data Reviewed  Labs: ordered. Radiology: ordered. Risk  Prescription drug management. Disposition  Final diagnoses:   Pelvic inflammatory disease     Time reflects when diagnosis was documented in both MDM as applicable and the Disposition within this note       Time User Action Codes Description Comment    11/11/2023 11:41 AM Amber Richards Add [N73.9] Pelvic inflammatory disease           ED Disposition       ED Disposition   Discharge    Condition   Stable    Date/Time   Sat Nov 11, 2023 11:41 AM    Comment   Ltanya Day discharge to home/self care.                    Follow-up Information       Follow up With Specialties Details Why Contact Info Additional Information    [address] Emergency Department Emergency Medicine  If symptoms worsen [address] [address]  [address] [facility] [address] Emergency Department, [address] , \[facility]", [address]            Patient's Medications   Discharge Prescriptions DOXYCYCLINE HYCLATE (VIBRAMYCIN) 100 MG CAPSULE    Take 1 capsule (100 mg total) by mouth 2 (two) times a day for 14 days       Start Date: 11/11/2023End Date: 11/25/2023       Order Dose: 100 mg       Quantity: 28 capsule    Refills: 0       No discharge procedures on file.     PDMP Review       None            ED Provider  Electronically Signed by             Masoud Clark MD  11/11/23 6411

## 2023-11-12 LAB
C TRACH DNA SPEC QL NAA+PROBE: NEGATIVE
N GONORRHOEA DNA SPEC QL NAA+PROBE: NEGATIVE

## 2023-12-05 ENCOUNTER — HOSPITAL ENCOUNTER (EMERGENCY)
Facility: HOSPITAL | Age: 28
Discharge: HOME | End: 2023-12-05
Attending: EMERGENCY MEDICINE
Payer: COMMERCIAL

## 2023-12-05 ENCOUNTER — APPOINTMENT (EMERGENCY)
Dept: RADIOLOGY | Facility: HOSPITAL | Age: 28
End: 2023-12-05
Payer: COMMERCIAL

## 2023-12-05 VITALS
DIASTOLIC BLOOD PRESSURE: 84 MMHG | RESPIRATION RATE: 20 BRPM | HEART RATE: 114 BPM | SYSTOLIC BLOOD PRESSURE: 129 MMHG | BODY MASS INDEX: 31.82 KG/M2 | TEMPERATURE: 97.6 F | OXYGEN SATURATION: 99 % | WEIGHT: 185.4 LBS

## 2023-12-05 DIAGNOSIS — E03.9 HYPOTHYROIDISM, UNSPECIFIED TYPE: ICD-10-CM

## 2023-12-05 DIAGNOSIS — J40 BRONCHITIS: Primary | ICD-10-CM

## 2023-12-05 LAB
FLUAV RNA RESP QL NAA+PROBE: NEGATIVE
FLUBV RNA RESP QL NAA+PROBE: NEGATIVE
RSV RNA RESP QL NAA+PROBE: NEGATIVE
SARS-COV-2 RNA RESP QL NAA+PROBE: NEGATIVE

## 2023-12-05 PROCEDURE — 0241U HB NFCT DS VIR RESP RNA 4 TRGT: CPT

## 2023-12-05 PROCEDURE — 71045 X-RAY EXAM CHEST 1 VIEW: CPT

## 2023-12-05 PROCEDURE — 99284 EMERGENCY DEPT VISIT MOD MDM: CPT | Performed by: EMERGENCY MEDICINE

## 2023-12-05 PROCEDURE — 99283 EMERGENCY DEPT VISIT LOW MDM: CPT

## 2023-12-05 RX ORDER — PREDNISONE 20 MG/1
60 TABLET ORAL ONCE
Status: COMPLETED | OUTPATIENT
Start: 2023-12-05 | End: 2023-12-05

## 2023-12-05 RX ORDER — LEVOTHYROXINE SODIUM 0.15 MG/1
150 TABLET ORAL DAILY
Qty: 30 TABLET | Refills: 0 | Status: SHIPPED | OUTPATIENT
Start: 2023-12-05 | End: 2024-01-04

## 2023-12-05 RX ORDER — PREDNISONE 20 MG/1
40 TABLET ORAL DAILY
Qty: 10 TABLET | Refills: 0 | Status: SHIPPED | OUTPATIENT
Start: 2023-12-05 | End: 2023-12-10

## 2023-12-05 RX ADMIN — PREDNISONE 60 MG: 20 TABLET ORAL at 18:39

## 2023-12-05 NOTE — Clinical Note
Imani Brooks was seen and treated in our emergency department on 12/5/2023. Diagnosis:     Jeanine  . She may return on this date: 12/07/2023         If you have any questions or concerns, please don't hesitate to call.       Ángel Mayo RN    ______________________________           _______________          _______________  [facility] Representative                              Date                                Time

## 2023-12-06 NOTE — ED PROVIDER NOTES
History  Chief Complaint   Patient presents with    Cough     States started on 11/30 with head congestion and later developed a cough, nausea, no vomiting, no appetite. Muscles hurt, patient works in a . ( RSV cases )      Patient presents for evaluation of head congestion that turned into a cough nausea but no vomiting and decreased appetite. Some myalgias and fatigue. She works at a  where they have had some RSV cases. Patient also reports she had a lapse of insurance and has not been taking her levothyroxine for some time. She has been using her inhaler more frequently. History provided by:  Patient   used: No    Cough      Prior to Admission Medications   Prescriptions Last Dose Informant Patient Reported? Taking?    albuterol (PROVENTIL HFA,VENTOLIN HFA) 90 mcg/act inhaler   No No   Sig: INHALE TWO PUFFS BY MOUTH EVERY 6 HOURS AS NEEDED FOR WHEEZING   escitalopram (LEXAPRO) 20 mg tablet   No No   Sig: TAKE ONE TABLET BY MOUTH EVERY DAY   levothyroxine 150 mcg tablet   No No   Sig: TAKE ONE TABLET BY MOUTH EVERY DAY   levothyroxine 150 mcg tablet   No Yes   Sig: Take 1 tablet (150 mcg total) by mouth daily   loratadine (CLARITIN) 10 mg tablet   No No   Sig: TAKE ONE TABLET BY MOUTH EVERY DAY   pantoprazole (PROTONIX) 40 mg tablet   No No   Sig: TAKE ONE TABLET BY MOUTH EVERY DAY      Facility-Administered Medications: None       Past Medical History:   Diagnosis Date    Abdominal pain     Altered bowel habits     constipation or diarrhea    Anxiety     Asthma     Back pain     Cellulitis     Resolved 12/1/2014     Contact lens/glasses fitting     Cryptic tonsil     Last assessed 8/6/2014     Disease of thyroid gland     hypothyroid    GERD (gastroesophageal reflux disease)     Hand paresthesia     Last assessed 8/5/2016     Heel pain 9/12/2016    Heel pain, bilateral     left heel injected 9/28/17    Kidney stone     Loss of appetite     Resolved 12/26/2017     Normal vaginal delivery 2020       Past Surgical History:   Procedure Laterality Date    COLONOSCOPY N/A 10/2/2017    Procedure: COLONOSCOPY;  Surgeon: Vivi Opitz, MD;  Location: [address] [place] [address] GI LAB; Service: Gastroenterology    ESOPHAGOGASTRODUODENOSCOPY N/A 10/2/2017    Procedure: ESOPHAGOGASTRODUODENOSCOPY (EGD); Surgeon: Vivi Opitz, MD;  Location: [facility] GI LAB; Service: Gastroenterology    INDUCED       KNEE ARTHROSCOPY Left     cartilage shaving    UPPER GASTROINTESTINAL ENDOSCOPY         Family History   Problem Relation Age of Onset    Anxiety disorder Mother     Thyroid disease unspecified Mother         over or under active not sure    Colon polyps Mother 46        Precancerous colon polyps removed    Hypothyroidism Sister     Asthma Sister     Cancer Family     Vision loss Maternal Uncle      I have reviewed and agree with the history as documented. E-Cigarette/Vaping    E-Cigarette Use Never User      E-Cigarette/Vaping Substances    Nicotine No     THC No     CBD No     Flavoring No     Other No     Unknown No      Social History     Tobacco Use    Smoking status: Every Day     Packs/day: 0.50     Years: 13.00     Total pack years: 6.50     Types: Cigarettes    Smokeless tobacco: Never   Vaping Use    Vaping Use: Never used   Substance Use Topics    Alcohol use: Not Currently    Drug use: Yes     Frequency: 7.0 times per week     Types: Marijuana     Comment: marijuana helps THC 21 1800       Review of Systems   Constitutional:  Positive for fatigue. HENT:  Positive for congestion. Respiratory:  Positive for cough. All other systems reviewed and are negative. Physical Exam  Physical Exam  Vitals and nursing note reviewed. Constitutional:       General: She is not in acute distress. HENT:      Nose: Congestion present. Mouth/Throat:      Mouth: Mucous membranes are moist.      Pharynx: Oropharynx is clear.    Cardiovascular:      Rate and Rhythm: Normal rate and regular rhythm. Pulmonary:      Effort: Pulmonary effort is normal. No respiratory distress. Breath sounds: Rhonchi present. Neurological:      General: No focal deficit present. Mental Status: She is alert and oriented to person, place, and time. Vital Signs  ED Triage Vitals [12/05/23 1809]   Temperature Pulse Respirations Blood Pressure SpO2   97.6 °F (36.4 °C) (!) 114 20 129/84 99 %      Temp Source Heart Rate Source Patient Position - Orthostatic VS BP Location FiO2 (%)   Tympanic Monitor Sitting Left arm --      Pain Score       8           Vitals:    12/05/23 1809   BP: 129/84   Pulse: (!) 114   Patient Position - Orthostatic VS: Sitting         Visual Acuity      ED Medications  Medications   predniSONE tablet 60 mg (60 mg Oral Given 12/5/23 1839)       Diagnostic Studies  Results Reviewed       Procedure Component Value Units Date/Time    FLU/RSV/COVID - if FLU/RSV clinically relevant [[id number]]  (Normal) Collected: 12/05/23 1819    Lab Status: Final result Specimen: Nares from Nose Updated: 12/05/23 1903     SARS-CoV-2 Negative     INFLUENZA A PCR Negative     INFLUENZA B PCR Negative     RSV PCR Negative    Narrative:      FOR PEDIATRIC PATIENTS - copy/paste COVID Guidelines URL to browser: [email or web address]    SARS-CoV-2 assay is a Nucleic Acid Amplification assay intended for the  qualitative detection of nucleic acid from SARS-CoV-2 in nasopharyngeal  swabs. Results are for the presumptive identification of SARS-CoV-2 RNA. Positive results are indicative of infection with SARS-CoV-2, the virus  causing COVID-19, but do not rule out bacterial infection or co-infection  with other viruses. Laboratories within the [facility] and its  territories are required to report all positive results to the appropriate  public health authorities.  Negative results do not preclude SARS-CoV-2  infection and should not be used as the sole basis for treatment or other  patient management decisions. Negative results must be combined with  clinical observations, patient history, and epidemiological information. This test has not been FDA cleared or approved. This test has been authorized by FDA under an Emergency Use Authorization  (EUA). This test is only authorized for the duration of time the  declaration that circumstances exist justifying the authorization of the  emergency use of an in vitro diagnostic tests for detection of SARS-CoV-2  virus and/or diagnosis of COVID-19 infection under section 564(b)(1) of  the Act, 21 U. S.C. 652XSS-3(B)(6), unless the authorization is terminated  or revoked sooner. The test has been validated but independent review by FDA  and CLIA is pending. Test performed using [employer] GeneXpert: This RT-PCR assay targets N2,  a region unique to SARS-CoV-2. A conserved region in the E-gene was chosen  for pan-Sarbecovirus detection which includes SARS-CoV-2. According to CMS-2020-01-R, this platform meets the definition of high-throughput technology. XR chest 1 view portable    (Results Pending)              Procedures  Procedures         ED Course                               SBIRT 20yo+      Flowsheet Row Most Recent Value   Initial Alcohol Screen: US AUDIT-C     1. How often do you have a drink containing alcohol? 0 Filed at: 12/05/2023 1812   Audit-C Score 0 Filed at: 12/05/2023 1812   YESENIA: How many times in the past year have you. .. Used an illegal drug or used a prescription medication for non-medical reasons? Never Filed at: 12/05/2023 1812                      Medical Decision Making  Pulse ox 99% on room air indicating adequate oxygenation. CXR: NAD as read by me    Amount and/or Complexity of Data Reviewed  Radiology: ordered and independent interpretation performed. Risk  Prescription drug management.              Disposition  Final diagnoses:   Bronchitis     Time reflects when diagnosis was documented in both MDM as applicable and the Disposition within this note       Time User Action Codes Description Comment    12/5/2023  7:06 PM Shane Paula Add [J40] Bronchitis     12/5/2023  7:06 PM Ranjit Qureshi Add [E03.9] Hypothyroidism, unspecified type Increase levothyroxine dose from 137 mcg to 150 mcg today. Depending on whether the insurance will allow her to get the 150 mcg doses today, we will be doing a          ED Disposition       ED Disposition   Discharge    Condition   Stable    Date/Time   Tue Dec 5, 2023 2215 [facility] discharge to home/self care.                    Follow-up Information       Follow up With Specialties Details Why Contact Info Additional Information    [address] Emergency Department Emergency Medicine  If symptoms worsen [address] [address]  [address] [facility] Emergency Department, [facility] [place] [address], [facility], [address]            Discharge Medication List as of 12/5/2023  7:07 PM        START taking these medications    Details   predniSONE 20 mg tablet Take 2 tablets (40 mg total) by mouth daily for 5 days, Starting Tue 12/5/2023, Until Sun 12/10/2023, Normal           CONTINUE these medications which have CHANGED    Details   levothyroxine 150 mcg tablet Take 1 tablet (150 mcg total) by mouth daily, Starting Tue 12/5/2023, Until Thu 1/4/2024, Normal           CONTINUE these medications which have NOT CHANGED    Details   albuterol (PROVENTIL HFA,VENTOLIN HFA) 90 mcg/act inhaler INHALE TWO PUFFS BY MOUTH EVERY 6 HOURS AS NEEDED FOR WHEEZING, Normal      escitalopram (LEXAPRO) 20 mg tablet TAKE ONE TABLET BY MOUTH EVERY DAY, Normal      loratadine (CLARITIN) 10 mg tablet TAKE ONE TABLET BY MOUTH EVERY DAY, Normal      pantoprazole (PROTONIX) 40 mg tablet TAKE ONE TABLET BY MOUTH EVERY DAY, Starting Wed 8/2/2023, Normal             No discharge procedures on file.    PDMP Review       None            ED Provider  Electronically Signed by             Estephania Sears DO  12/05/23 [id number] [employer]

## 2024-01-30 ENCOUNTER — TELEPHONE (OUTPATIENT)
Dept: FAMILY MEDICINE CLINIC | Facility: CLINIC | Age: 29
End: 2024-01-30

## 2024-01-30 NOTE — TELEPHONE ENCOUNTER
VM on appt line:    Hi, my name is Onur Goodrich. I'm calling because I wanted to see about getting a physical done, but I need to know how much it's going to cost. I don't have health insurance. If you can give me a call back as soon as possible that would be great. My phone number is [phone]. Thank you.    Spoke with pt - scheduled appt.

## 2024-03-28 ENCOUNTER — OFFICE VISIT (OUTPATIENT)
Dept: FAMILY MEDICINE CLINIC | Facility: CLINIC | Age: 29
End: 2024-03-28

## 2024-03-28 VITALS
RESPIRATION RATE: 16 BRPM | SYSTOLIC BLOOD PRESSURE: 104 MMHG | TEMPERATURE: 99.2 F | HEIGHT: 64 IN | HEART RATE: 64 BPM | BODY MASS INDEX: 30.49 KG/M2 | DIASTOLIC BLOOD PRESSURE: 78 MMHG | WEIGHT: 178.6 LBS

## 2024-03-28 DIAGNOSIS — F32.2 SEVERE DEPRESSION (HCC): ICD-10-CM

## 2024-03-28 DIAGNOSIS — Z13.6 SCREENING FOR CARDIOVASCULAR, RESPIRATORY, AND GENITOURINARY DISEASES: ICD-10-CM

## 2024-03-28 DIAGNOSIS — Z13.0 SCREENING FOR DEFICIENCY ANEMIA: ICD-10-CM

## 2024-03-28 DIAGNOSIS — E03.9 HYPOTHYROIDISM, UNSPECIFIED TYPE: ICD-10-CM

## 2024-03-28 DIAGNOSIS — Z00.00 WELL ADULT EXAM: Primary | ICD-10-CM

## 2024-03-28 DIAGNOSIS — Z72.0 TOBACCO USE: ICD-10-CM

## 2024-03-28 DIAGNOSIS — Z13.83 SCREENING FOR CARDIOVASCULAR, RESPIRATORY, AND GENITOURINARY DISEASES: ICD-10-CM

## 2024-03-28 DIAGNOSIS — Z13.89 SCREENING FOR CARDIOVASCULAR, RESPIRATORY, AND GENITOURINARY DISEASES: ICD-10-CM

## 2024-03-28 DIAGNOSIS — F41.9 ANXIETY: ICD-10-CM

## 2024-03-28 PROBLEM — R10.10 PAIN OF UPPER ABDOMEN: Status: RESOLVED | Noted: 2019-04-22 | Resolved: 2024-03-28

## 2024-03-28 PROCEDURE — 99395 PREV VISIT EST AGE 18-39: CPT | Performed by: FAMILY MEDICINE

## 2024-03-28 NOTE — ASSESSMENT & PLAN NOTE
She is on levothyroxine 150 mcg daily- doesn't feel right. Advised her on importance of getting labs done.

## 2024-03-28 NOTE — PROGRESS NOTES
ADULT ANNUAL PHYSICAL  [facility]    NAME: Jeanine Goodrich  AGE: 28 y.o. SEX: female  : 1995     DATE: 3/28/2024     Assessment and Plan:     Problem List Items Addressed This Visit          Endocrine    Hypothyroidism     She is on levothyroxine 150 mcg daily- doesn't feel right. Advised her on importance of getting labs done.          Relevant Orders    TSH, 3rd generation    T4, free       Behavioral Health    Anxiety     Uncontrolled. No SI/HI. She was previously on lexapro 20mg daily, but states it didn't help and has not been taking it for a while. Will trial zoloft 50mg daily. She has no insurance- good RX coupon provided for 90 day supply. She is trying to get insurance. Follow up in 3 months.          Relevant Medications    sertraline (Zoloft) 50 mg tablet    Tobacco use     Counseled on cessation. She is not ready.          Severe depression (HCC)     Uncontrolled. No SI/HI. She was previously on lexapro 20mg daily, but states it didn't help and has not been taking it for a while. Will trial zoloft 50mg daily. She has no insurance- good RX coupon provided for 90 day supply. She is trying to get insurance. Follow up in 3 months.          Relevant Medications    sertraline (Zoloft) 50 mg tablet     Other Visit Diagnoses       Well adult exam    -  Primary    Screening for deficiency anemia        Relevant Orders    CBC and differential    Screening for cardiovascular, respiratory, and genitourinary diseases        Relevant Orders    Comprehensive metabolic panel    Lipid Panel with Direct LDL reflex            Immunizations and preventive care screenings were discussed with patient today. Appropriate education was printed on patient's after visit summary.           No follow-ups on file.     Chief Complaint:     Chief Complaint   Patient presents with    Physical Exam     YC    Establish Care      History of Present Illness:     Adult Annual Physical    Patient here for a comprehensive physical exam. The patient reports problems - depression, anxiety, fatigue .    Diet and Physical Activity  Diet/Nutrition: poor diet.   Exercise: no formal exercise.      Depression Screening  PHQ-2/9 Depression Screening    Little interest or pleasure in doing things: 3 - nearly every day  Feeling down, depressed, or hopeless: 2 - more than half the days  Trouble falling or staying asleep, or sleeping too much: 3 - nearly every day  Feeling tired or having little energy: 3 - nearly every day  Poor appetite or overeating: 3 - nearly every day  Feeling bad about yourself - or that you are a failure or have let yourself or your family down: 3 - nearly every day  Trouble concentrating on things, such as reading the newspaper or watching television: 3 - nearly every day  Moving or speaking so slowly that other people could have noticed. Or the opposite - being so fidgety or restless that you have been moving around a lot more than usual: 2 - more than half the days  Thoughts that you would be better off dead, or of hurting yourself in some way: 0 - not at all  PHQ-2 Score: 5  PHQ-2 Interpretation: POSITIVE depression screen  PHQ-9 Score: 22  PHQ-9 Interpretation: Severe depression       General Health  Sleep: sleeps poorly.   Hearing: normal - bilateral.  Vision: no vision problems, most recent eye exam >1 year ago, wears glasses, and wears contacts.   Dental: no dental visits for >1 year.       /GYN Health  Follows with gynecology? no   Regular periods, no issues.   She is due for a pap, but currently does not have insurance. Will schedule it when she gets her insurance.       Review of Systems:     Review of Systems   Constitutional:  Positive for fatigue.   HENT: Negative.     Eyes: Negative.    Respiratory: Negative.     Cardiovascular: Negative.    Gastrointestinal: Negative.    Endocrine: Negative.    Genitourinary: Negative.    Musculoskeletal: Negative.    Skin: Negative.     Allergic/Immunologic: Negative.    Neurological: Negative.    Hematological: Negative.    Psychiatric/Behavioral:  Positive for decreased concentration, dysphoric mood and sleep disturbance. Negative for self-injury and suicidal ideas. The patient is nervous/anxious.       Past Medical History:     Past Medical History:   Diagnosis Date    Abdominal pain     Altered bowel habits     constipation or diarrhea    Anxiety     Asthma     Back pain     Cellulitis     Resolved 2014     Contact lens/glasses fitting     Cryptic tonsil     Last assessed 2014     Disease of thyroid gland     hypothyroid    GERD (gastroesophageal reflux disease)     Hand paresthesia     Last assessed 2016     Heel pain 2016    Heel pain, bilateral     left heel injected 17    Kidney stone     Loss of appetite     Resolved 2017     Normal vaginal delivery 2020      Past Surgical History:     Past Surgical History:   Procedure Laterality Date    COLONOSCOPY N/A 10/2/2017    Procedure: COLONOSCOPY;  Surgeon: Tenzin Muller MD;  Location: [facility] GI LAB;  Service: Gastroenterology    ESOPHAGOGASTRODUODENOSCOPY N/A 10/2/2017    Procedure: ESOPHAGOGASTRODUODENOSCOPY (EGD);  Surgeon: Tenzin Muller MD;  Location: [facility] GI LAB;  Service: Gastroenterology    INDUCED       KNEE ARTHROSCOPY Left     cartilage shaving    UPPER GASTROINTESTINAL ENDOSCOPY        Social History:     Social History     Socioeconomic History    Marital status: Single     Spouse name: None    Number of children: None    Years of education: None    Highest education level: None   Occupational History    None   Tobacco Use    Smoking status: Every Day     Current packs/day: 0.50     Average packs/day: 0.5 packs/day for 13.0 years (6.5 ttl pk-yrs)     Types: Cigarettes     Passive exposure: Current    Smokeless tobacco: Never   Vaping Use    Vaping status: Never Used   Substance and Sexual Activity    Alcohol use: Not Currently    Drug use:  "Yes     Frequency: 7.0 times per week     Types: Marijuana     Comment: marijuana helps THC 11/09/21 1800    Sexual activity: Yes     Birth control/protection: Pill   Other Topics Concern    None   Social History Narrative    None     Social Determinants of Health     Financial Resource Strain: Not on file   Food Insecurity: Not on file   Transportation Needs: Not on file   Physical Activity: Not on file   Stress: Not on file   Social Connections: Not on file   Intimate Partner Violence: Not on file   Housing Stability: Not on file      Family History:     Family History   Problem Relation Age of Onset    Anxiety disorder Mother     Thyroid disease unspecified Mother         over or under active not sure    Colon polyps Mother 52        Precancerous colon polyps removed    Hypothyroidism Sister     Asthma Sister     Cancer Family     Vision loss Maternal Uncle       Current Medications:     Current Outpatient Medications   Medication Sig Dispense Refill    albuterol (PROVENTIL HFA,VENTOLIN HFA) 90 mcg/act inhaler INHALE TWO PUFFS BY MOUTH EVERY 6 HOURS AS NEEDED FOR WHEEZING 8.5 g 0    levothyroxine 150 mcg tablet Take 1 tablet (150 mcg total) by mouth daily 30 tablet 0    loratadine (CLARITIN) 10 mg tablet TAKE ONE TABLET BY MOUTH EVERY DAY 30 tablet 0    sertraline (Zoloft) 50 mg tablet Take 1 tablet (50 mg total) by mouth daily 90 tablet 0     No current facility-administered medications for this visit.      Allergies:     Allergies   Allergen Reactions    Ciprofloxacin Other (See Comments)     Skin felt like it was \"on fire\"      Physical Exam:     /78   Pulse 64   Temp 99.2 °F (37.3 °C) (Tympanic)   Resp 16   Ht 5' 4.37\" (1.635 m)   Wt 81 kg (178 lb 9.6 oz)   LMP 03/22/2024 (Exact Date)   BMI 30.31 kg/m²     Physical Exam  Vitals and nursing note reviewed.   Constitutional:       General: She is not in acute distress.     Appearance: She is well-developed.   HENT:      Head: Normocephalic and " atraumatic.      Right Ear: Tympanic membrane, ear canal and external ear normal. There is no impacted cerumen.      Left Ear: Tympanic membrane, ear canal and external ear normal. There is no impacted cerumen.      Nose: Nose normal.      Mouth/Throat:      Mouth: Mucous membranes are moist.   Eyes:      Conjunctiva/sclera: Conjunctivae normal.   Cardiovascular:      Rate and Rhythm: Normal rate and regular rhythm.      Heart sounds: No murmur heard.  Pulmonary:      Effort: Pulmonary effort is normal. No respiratory distress.      Breath sounds: Normal breath sounds.   Abdominal:      General: Abdomen is flat. There is no distension.      Palpations: Abdomen is soft. There is no mass.      Tenderness: There is no abdominal tenderness. There is no guarding or rebound.      Hernia: No hernia is present.   Musculoskeletal:         General: Normal range of motion.      Cervical back: Neck supple.   Skin:     General: Skin is warm and dry.   Neurological:      General: No focal deficit present.      Mental Status: She is alert and oriented to person, place, and time.          Luisa Bell MD   [facility]

## 2024-03-28 NOTE — ASSESSMENT & PLAN NOTE
Uncontrolled. No SI/HI. She was previously on lexapro 20mg daily, but states it didn't help and has not been taking it for a while. Will trial zoloft 50mg daily. She has no insurance- good RX coupon provided for 90 day supply. She is trying to get insurance. Follow up in 3 months.

## 2024-05-08 ENCOUNTER — OFFICE VISIT (OUTPATIENT)
Dept: URGENT CARE | Facility: CLINIC | Age: 29
End: 2024-05-08

## 2024-05-08 VITALS
BODY MASS INDEX: 30 KG/M2 | TEMPERATURE: 98 F | DIASTOLIC BLOOD PRESSURE: 80 MMHG | HEART RATE: 107 BPM | WEIGHT: 176.8 LBS | RESPIRATION RATE: 18 BRPM | SYSTOLIC BLOOD PRESSURE: 126 MMHG | OXYGEN SATURATION: 98 %

## 2024-05-08 DIAGNOSIS — J06.9 UPPER RESPIRATORY TRACT INFECTION, UNSPECIFIED TYPE: Primary | ICD-10-CM

## 2024-05-08 LAB — S PYO AG THROAT QL: NEGATIVE

## 2024-05-08 PROCEDURE — 99213 OFFICE O/P EST LOW 20 MIN: CPT | Performed by: PHYSICIAN ASSISTANT

## 2024-05-08 PROCEDURE — 87880 STREP A ASSAY W/OPTIC: CPT | Performed by: PHYSICIAN ASSISTANT

## 2024-05-08 NOTE — PROGRESS NOTES
[facility]        NAME: Jeanine Goodrich is a 28 y.o. female  : 1995    MRN: [id number]  DATE: May 8, 2024  TIME: 5:47 PM    Assessment and Plan   Upper respiratory tract infection, unspecified type [J06.9]  1. Upper respiratory tract infection, unspecified type  POCT rapid ANTIGEN strepA        Neg strep, suspect viral URI. Discussed importance of staying hydrated. Discussed supportive care and otc meds for symptomatic relief. May use tea with honey and a cool mist humidifier for symptoms. Encouraged salt water gargles if sore throat. Discussed strict return to care precautions as well as red flag symptoms which should prompt immediate ED referral. Pt verbalized understanding and is in agreement with plan.  Please follow up with your primary care provider within the next week. Please remember that your visit today was with an urgent care provider and should not replace follow up with your primary care provider for chronic medical issues or annual physicals.       Patient Instructions       Follow up with PCP in 3-5 days.  Proceed to  ER if symptoms worsen.    If tests are performed, our office will contact you with results only if changes need to made to the care plan discussed with you at the visit. You can review your full results on [facility].    Chief Complaint     Chief Complaint   Patient presents with    Sinusitis     Sinus pressure and congestion since Monday.          History of Present Illness       Jeanine Goodrich is a(n) 28 y.o. female presenting with URI symptoms x 3 days  Past medical history: asthma, anxiety, hypothyroid, GERD, kidney stones  Congestion: yes  Sore throat: yes  Cough: yes  Sputum production: no  Fever: no  Body aches: no  Loss of smell/taste: no  GI symptoms: diarrhea today  Known sick contacts: no but does work at [employer]  OTC meds tried: claritin yesterday which did not help        Review of Systems   Review of Systems   Constitutional:         Negative  except as noted in HPI   Respiratory:  Negative for shortness of breath.    Cardiovascular:  Negative for chest pain.         Current Medications       Current Outpatient Medications:     albuterol (PROVENTIL HFA,VENTOLIN HFA) 90 mcg/act inhaler, INHALE TWO PUFFS BY MOUTH EVERY 6 HOURS AS NEEDED FOR WHEEZING, Disp: 8.5 g, Rfl: 0    loratadine (CLARITIN) 10 mg tablet, TAKE ONE TABLET BY MOUTH EVERY DAY, Disp: 30 tablet, Rfl: 0    sertraline (Zoloft) 50 mg tablet, Take 1 tablet (50 mg total) by mouth daily, Disp: 90 tablet, Rfl: 0    levothyroxine 150 mcg tablet, Take 1 tablet (150 mcg total) by mouth daily, Disp: 30 tablet, Rfl: 0    Current Allergies     Allergies as of 05/08/2024 - Reviewed 05/08/2024   Allergen Reaction Noted    Ciprofloxacin Other (See Comments) 01/14/2016            The following portions of the patient's history were reviewed and updated as appropriate: allergies, current medications, past family history, past medical history, past social history, past surgical history and problem list.     Past Medical History:   Diagnosis Date    Abdominal pain     Altered bowel habits     constipation or diarrhea    Anxiety     Asthma     Back pain     Cellulitis     Resolved 12/1/2014     Contact lens/glasses fitting     Cryptic tonsil     Last assessed 8/6/2014     Disease of thyroid gland     hypothyroid    GERD (gastroesophageal reflux disease)     Hand paresthesia     Last assessed 8/5/2016     Heel pain 9/12/2016    Heel pain, bilateral     left heel injected 9/28/17    Kidney stone     Loss of appetite     Resolved 12/26/2017     Normal vaginal delivery 12/2020       Past Surgical History:   Procedure Laterality Date    COLONOSCOPY N/A 10/2/2017    Procedure: COLONOSCOPY;  Surgeon: Tenzin Muller MD;  Location: [facility] GI LAB;  Service: Gastroenterology    ESOPHAGOGASTRODUODENOSCOPY N/A 10/2/2017    Procedure: ESOPHAGOGASTRODUODENOSCOPY (EGD);  Surgeon: Tenzin Muller MD;  Location: [facility] GI LAB;   Service: Gastroenterology    INDUCED   2011    KNEE ARTHROSCOPY Left     cartilage shaving    UPPER GASTROINTESTINAL ENDOSCOPY         Family History   Problem Relation Age of Onset    Anxiety disorder Mother     Thyroid disease unspecified Mother         over or under active not sure    Colon polyps Mother 52        Precancerous colon polyps removed    Hypothyroidism Sister     Asthma Sister     Cancer Family     Vision loss Maternal Uncle          Medications have been verified.        Objective   /80   Pulse (!) 107   Temp 98 °F (36.7 °C)   Resp 18   Wt 80.2 kg (176 lb 12.8 oz)   LMP 2024   SpO2 98%   BMI 30.00 kg/m²        Physical Exam     Physical Exam  Vitals and nursing note reviewed.   Constitutional:       General: She is not in acute distress.     Appearance: Normal appearance. She is not toxic-appearing.   HENT:      Head: Normocephalic and atraumatic.      Right Ear: Tympanic membrane, ear canal and external ear normal.      Left Ear: Tympanic membrane, ear canal and external ear normal.      Nose: Congestion present.      Mouth/Throat:      Mouth: Mucous membranes are moist.      Pharynx: Oropharynx is clear. Posterior oropharyngeal erythema present. No oropharyngeal exudate.   Eyes:      Conjunctiva/sclera: Conjunctivae normal.      Pupils: Pupils are equal, round, and reactive to light.   Cardiovascular:      Rate and Rhythm: Normal rate and regular rhythm.      Heart sounds: Normal heart sounds.   Pulmonary:      Effort: Pulmonary effort is normal. No respiratory distress.      Breath sounds: Normal breath sounds. No wheezing, rhonchi or rales.   Musculoskeletal:      Cervical back: Normal range of motion and neck supple.   Lymphadenopathy:      Cervical: Cervical adenopathy present.   Skin:     General: Skin is warm and dry.      Capillary Refill: Capillary refill takes less than 2 seconds.   Neurological:      Mental Status: She is alert and oriented to person, place,  and time.   Psychiatric:         Behavior: Behavior normal.

## 2024-07-04 ENCOUNTER — TELEPHONE (OUTPATIENT)
Age: 29
End: 2024-07-04

## 2024-07-04 NOTE — TELEPHONE ENCOUNTER
[facility]- please remove Dr Colindres as PCP. Confirmed with patient they have established care with a new PCP within [facility] on  3/28/24.

## 2024-07-14 NOTE — TELEPHONE ENCOUNTER
07/14/241:37 AM    Sindi     Per update to patient attribution workflow:     When patient is scheduled with another  PCP- No encounter to be sent- Only new  PCP to update at time of first visit.     When patient has already established care with another  PCP and your office is still listed   Call new PCP office (QL, Admin, Coordinator) speak to Admin, have office change to appropriate PCP- No encounter needs to be sent to care gap.     Thank you,  Dolores Stone

## 2024-10-15 ENCOUNTER — OFFICE VISIT (OUTPATIENT)
Dept: URGENT CARE | Facility: CLINIC | Age: 29
End: 2024-10-15

## 2024-10-15 VITALS
WEIGHT: 190 LBS | SYSTOLIC BLOOD PRESSURE: 136 MMHG | BODY MASS INDEX: 31.65 KG/M2 | HEART RATE: 108 BPM | HEIGHT: 65 IN | OXYGEN SATURATION: 98 % | DIASTOLIC BLOOD PRESSURE: 89 MMHG | RESPIRATION RATE: 18 BRPM | TEMPERATURE: 98 F

## 2024-10-15 DIAGNOSIS — R05.1 ACUTE COUGH: Primary | ICD-10-CM

## 2024-10-15 DIAGNOSIS — E03.9 HYPOTHYROIDISM, UNSPECIFIED TYPE: ICD-10-CM

## 2024-10-15 PROCEDURE — 99213 OFFICE O/P EST LOW 20 MIN: CPT | Performed by: FAMILY MEDICINE

## 2024-10-15 RX ORDER — LEVOTHYROXINE SODIUM 150 UG/1
150 TABLET ORAL DAILY
Qty: 60 TABLET | Refills: 0 | Status: SHIPPED | OUTPATIENT
Start: 2024-10-15 | End: 2024-12-14

## 2024-10-15 NOTE — LETTER
October 15, 2024     Patient: Jeanine Goodrich   YOB: 1995   Date of Visit: 10/15/2024       To Whom It May Concern:    Jeanine Goodrich was seen in my office on 10/15/2024.  Please excuse her absence.  May return to work on 10/16/2024 if symptoms are improved.  If you have any questions or concerns, please don't hesitate to call.         Sincerely,        Kizzy Brand MD

## 2024-10-15 NOTE — PROGRESS NOTES
[facility]        NAME: Jeanine Goodrich is a 29 y.o. female  : 1995    MRN: [id number]  DATE: October 15, 2024  TIME: 10:41 AM    Assessment and Plan   Acute cough [R05.1]    Hypothyroidism    levothyroxine 150 mcg  ______________________________________________________    Supportive measures encouraged for her cough.    Other reported symptoms likely linked to her uncontrolled hypothyroidism.  As such we will prescribe 2 months worth of levothyroxine 150 mcg.  Patient expected to have insurance in a month where she will be able to do blood work and continue proper management of her thyroid.    Patient Instructions     Follow up with PCP in 3-5 days.  Proceed to  ER if symptoms worsen.    If tests have been performed at [facility], our office will contact you with results if changes need to be made to the care plan discussed with you at the visit.  You can review your full results on [facility] MyChart.    Chief Complaint     Chief Complaint   Patient presents with    Cold Like Symptoms     Pt states that she started with a head cold on Friday. She states she has sinus pressure, body aches, and cough. She states she vomited this morning due to nausea and coughing. She states that she hasn't been able to get her medications in a while. (Albuterol and Levothyroxine ) Pt taking dayquil.         History of Present Illness       29-year-old female presents today with about 4 days of URI symptoms.  This is occurring within the setting of other chronic symptoms of muscle fatigue, weight gain, frequent infections which she attributes to her uncontrolled hypothyroidism.  Has been without health insurance for the past 2 years and would only get 30-day prescriptions from various doctors when she could.        Review of Systems   Review of Systems   Constitutional:  Positive for activity change and fatigue. Negative for fever.   HENT:  Positive for congestion and rhinorrhea.    Respiratory:  Positive for cough.  Negative for shortness of breath.    Cardiovascular:  Negative for chest pain.   Gastrointestinal:  Negative for abdominal pain and nausea.   Neurological:  Positive for weakness.     Current Medications       Current Outpatient Medications:     levothyroxine 150 mcg tablet, Take 1 tablet (150 mcg total) by mouth daily, Disp: 60 tablet, Rfl: 0    albuterol (PROVENTIL HFA,VENTOLIN HFA) 90 mcg/act inhaler, INHALE TWO PUFFS BY MOUTH EVERY 6 HOURS AS NEEDED FOR WHEEZING (Patient not taking: Reported on 10/15/2024), Disp: 8.5 g, Rfl: 0    loratadine (CLARITIN) 10 mg tablet, TAKE ONE TABLET BY MOUTH EVERY DAY (Patient not taking: Reported on 10/15/2024), Disp: 30 tablet, Rfl: 0    sertraline (Zoloft) 50 mg tablet, Take 1 tablet (50 mg total) by mouth daily, Disp: 90 tablet, Rfl: 0    Current Allergies     Allergies as of 10/15/2024 - Reviewed 10/15/2024   Allergen Reaction Noted    Ciprofloxacin Other (See Comments) 01/14/2016            The following portions of the patient's history were reviewed and updated as appropriate: allergies, current medications, past family history, past medical history, past social history, past surgical history and problem list.     Past Medical History:   Diagnosis Date    Abdominal pain     Altered bowel habits     constipation or diarrhea    Anxiety     Asthma     Back pain     Cellulitis     Resolved 12/1/2014     Contact lens/glasses fitting     Cryptic tonsil     Last assessed 8/6/2014     Disease of thyroid gland     hypothyroid    GERD (gastroesophageal reflux disease)     Hand paresthesia     Last assessed 8/5/2016     Heel pain 9/12/2016    Heel pain, bilateral     left heel injected 9/28/17    Kidney stone     Loss of appetite     Resolved 12/26/2017     Normal vaginal delivery 12/2020       Past Surgical History:   Procedure Laterality Date    COLONOSCOPY N/A 10/2/2017    Procedure: COLONOSCOPY;  Surgeon: Tenzin Muller MD;  Location: [facility] GI LAB;  Service: Gastroenterology     "ESOPHAGOGASTRODUODENOSCOPY N/A 10/2/2017    Procedure: ESOPHAGOGASTRODUODENOSCOPY (EGD);  Surgeon: Tenzin Muller MD;  Location: [facility] GI LAB;  Service: Gastroenterology    INDUCED       KNEE ARTHROSCOPY Left     cartilage shaving    UPPER GASTROINTESTINAL ENDOSCOPY         Family History   Problem Relation Age of Onset    Anxiety disorder Mother     Thyroid disease unspecified Mother         over or under active not sure    Colon polyps Mother 52        Precancerous colon polyps removed    Hypothyroidism Sister     Asthma Sister     Cancer Family     Vision loss Maternal Uncle          Medications have been verified.        Objective   /89   Pulse (!) 108   Temp 98 °F (36.7 °C)   Resp 18   Ht 5' 5\" (1.651 m)   Wt 86.2 kg (190 lb)   SpO2 98%   BMI 31.62 kg/m²   No LMP recorded.       Physical Exam     Physical Exam  Vitals and nursing note reviewed.   Constitutional:       General: She is in acute distress.      Appearance: Normal appearance. She is not ill-appearing, toxic-appearing or diaphoretic.   HENT:      Head: Normocephalic and atraumatic.      Nose: Nose normal.      Mouth/Throat:      Mouth: Mucous membranes are moist.      Pharynx: No posterior oropharyngeal erythema.   Eyes:      General:         Right eye: No discharge.         Left eye: No discharge.      Conjunctiva/sclera: Conjunctivae normal.   Cardiovascular:      Rate and Rhythm: Normal rate and regular rhythm.   Pulmonary:      Effort: Pulmonary effort is normal. No respiratory distress.      Breath sounds: Normal breath sounds. No wheezing, rhonchi or rales.   Skin:     General: Skin is warm.      Findings: No erythema.   Neurological:      General: No focal deficit present.      Mental Status: She is alert and oriented to person, place, and time.   Psychiatric:         Mood and Affect: Mood normal.         Behavior: Behavior normal.         Thought Content: Thought content normal.         Judgment: Judgment normal. "

## 2024-12-04 ENCOUNTER — OFFICE VISIT (OUTPATIENT)
Dept: URGENT CARE | Facility: CLINIC | Age: 29
End: 2024-12-04

## 2024-12-04 VITALS
WEIGHT: 185 LBS | HEART RATE: 97 BPM | RESPIRATION RATE: 14 BRPM | BODY MASS INDEX: 30.79 KG/M2 | OXYGEN SATURATION: 100 % | TEMPERATURE: 97 F

## 2024-12-04 DIAGNOSIS — J01.00 ACUTE NON-RECURRENT MAXILLARY SINUSITIS: Primary | ICD-10-CM

## 2024-12-04 PROCEDURE — 99213 OFFICE O/P EST LOW 20 MIN: CPT

## 2024-12-04 NOTE — PROGRESS NOTES
[facility]        NAME: Jeanine Goodrich is a 29 y.o. female  : 1995    MRN: [id number]  DATE: 2024  TIME: 4:50 PM    Assessment and Plan   Acute non-recurrent maxillary sinusitis [J01.00]  1. Acute non-recurrent maxillary sinusitis  amoxicillin-clavulanate (AUGMENTIN) 875-125 mg per tablet        Sinusitis for 3 weeks, mildly improved with OTC medication but comes back when they wear off. Will treat.     Patient Instructions     Supportive care with rest, adequate hydration, and warm liquids   Antibiotics as prescribed  Over the counter Tylenol/acetaminophen as needed for fever  Over the counter cold/cough medicine as needed    Follow up with PCP in 3-5 days   Go to ER if worsening symptoms    If tests are performed, our office will contact you with results only if changes need to made to the care plan discussed with you at the visit. You can review your full results on [facility].    Chief Complaint     Chief Complaint   Patient presents with    Cold Like Symptoms     Pt presents with sinus congestion, nasal congestion, headache, chest congestion; started three weeks ago         History of Present Illness       Sinusitis  This is a new problem. The current episode started 1 to 4 weeks ago. The problem is unchanged. There has been no fever. Associated symptoms include congestion, coughing, headaches and sinus pressure. Pertinent negatives include no chills, ear pain, shortness of breath or sore throat. Treatments tried: Flonase, rotatussin. The treatment provided moderate relief.       Review of Systems   Review of Systems   Constitutional:  Negative for chills and fever.   HENT:  Positive for congestion and sinus pressure. Negative for ear pain and sore throat.    Eyes:  Negative for pain and visual disturbance.   Respiratory:  Positive for cough and chest tightness (congestion). Negative for shortness of breath.    Cardiovascular:  Negative for chest pain and palpitations.    Gastrointestinal:  Negative for abdominal pain and vomiting.   Genitourinary:  Negative for dysuria and hematuria.   Musculoskeletal:  Negative for arthralgias and back pain.   Skin:  Negative for color change and rash.   Neurological:  Positive for headaches. Negative for seizures and syncope.   All other systems reviewed and are negative.        Current Medications       Current Outpatient Medications:     albuterol (PROVENTIL HFA,VENTOLIN HFA) 90 mcg/act inhaler, INHALE TWO PUFFS BY MOUTH EVERY 6 HOURS AS NEEDED FOR WHEEZING, Disp: 8.5 g, Rfl: 0    amoxicillin-clavulanate (AUGMENTIN) 875-125 mg per tablet, Take 1 tablet by mouth every 12 (twelve) hours for 7 days, Disp: 14 tablet, Rfl: 0    levothyroxine 150 mcg tablet, Take 1 tablet (150 mcg total) by mouth daily, Disp: 60 tablet, Rfl: 0    sertraline (Zoloft) 50 mg tablet, Take 1 tablet (50 mg total) by mouth daily, Disp: 90 tablet, Rfl: 0    loratadine (CLARITIN) 10 mg tablet, TAKE ONE TABLET BY MOUTH EVERY DAY (Patient not taking: Reported on 12/4/2024), Disp: 30 tablet, Rfl: 0    Current Allergies     Allergies as of 12/04/2024 - Reviewed 12/04/2024   Allergen Reaction Noted    Ciprofloxacin Other (See Comments) 01/14/2016            The following portions of the patient's history were reviewed and updated as appropriate: allergies, current medications, past family history, past medical history, past social history, past surgical history and problem list.     Past Medical History:   Diagnosis Date    Abdominal pain     Altered bowel habits     constipation or diarrhea    Anxiety     Asthma     Back pain     Cellulitis     Resolved 12/1/2014     Contact lens/glasses fitting     Cryptic tonsil     Last assessed 8/6/2014     Disease of thyroid gland     hypothyroid    GERD (gastroesophageal reflux disease)     Hand paresthesia     Last assessed 8/5/2016     Heel pain 9/12/2016    Heel pain, bilateral     left heel injected 9/28/17    Kidney stone     Loss of  appetite     Resolved 2017     Normal vaginal delivery 2020       Past Surgical History:   Procedure Laterality Date    COLONOSCOPY N/A 10/2/2017    Procedure: COLONOSCOPY;  Surgeon: Tenzin Muller MD;  Location: [facility] GI LAB;  Service: Gastroenterology    ESOPHAGOGASTRODUODENOSCOPY N/A 10/2/2017    Procedure: ESOPHAGOGASTRODUODENOSCOPY (EGD);  Surgeon: Tenzin Muller MD;  Location: [facility] GI LAB;  Service: Gastroenterology    INDUCED       KNEE ARTHROSCOPY Left     cartilage shaving    UPPER GASTROINTESTINAL ENDOSCOPY         Family History   Problem Relation Age of Onset    Anxiety disorder Mother     Thyroid disease unspecified Mother         over or under active not sure    Colon polyps Mother 52        Precancerous colon polyps removed    Hypothyroidism Sister     Asthma Sister     Cancer Family     Vision loss Maternal Uncle          Medications have been verified.        Objective   Pulse 97   Temp (!) 97 °F (36.1 °C)   Resp 14   Wt 83.9 kg (185 lb)   LMP 2024 (Approximate)   SpO2 100%   BMI 30.79 kg/m²        Physical Exam     Physical Exam  Constitutional:       General: She is not in acute distress.     Appearance: She is not ill-appearing.   HENT:      Nose: Congestion present.      Right Sinus: Maxillary sinus tenderness present. No frontal sinus tenderness.      Left Sinus: Maxillary sinus tenderness present. No frontal sinus tenderness.      Mouth/Throat:      Mouth: Mucous membranes are moist.      Pharynx: Oropharynx is clear. No posterior oropharyngeal erythema.   Eyes:      Pupils: Pupils are equal, round, and reactive to light.   Cardiovascular:      Rate and Rhythm: Normal rate and regular rhythm.      Pulses: Normal pulses.      Heart sounds: Normal heart sounds. No murmur heard.     No gallop.   Pulmonary:      Effort: Pulmonary effort is normal. No respiratory distress.      Breath sounds: Normal breath sounds. No wheezing.   Abdominal:      General: Abdomen is  flat. Bowel sounds are normal. There is no distension.      Palpations: Abdomen is soft.      Tenderness: There is no abdominal tenderness.   Musculoskeletal:         General: Normal range of motion.      Cervical back: Normal range of motion.   Skin:     General: Skin is warm and dry.      Capillary Refill: Capillary refill takes less than 2 seconds.   Neurological:      Mental Status: She is alert and oriented to person, place, and time.

## 2025-01-02 ENCOUNTER — HOSPITAL ENCOUNTER (EMERGENCY)
Facility: HOSPITAL | Age: 30
Discharge: HOME | End: 2025-01-02
Attending: EMERGENCY MEDICINE
Payer: COMMERCIAL

## 2025-01-02 VITALS
BODY MASS INDEX: 31.62 KG/M2 | WEIGHT: 185.2 LBS | SYSTOLIC BLOOD PRESSURE: 115 MMHG | OXYGEN SATURATION: 100 % | HEIGHT: 64 IN | TEMPERATURE: 98.7 F | RESPIRATION RATE: 12 BRPM | HEART RATE: 82 BPM | DIASTOLIC BLOOD PRESSURE: 70 MMHG

## 2025-01-02 DIAGNOSIS — R19.7 VOMITING AND DIARRHEA: Primary | ICD-10-CM

## 2025-01-02 DIAGNOSIS — R11.10 VOMITING AND DIARRHEA: Primary | ICD-10-CM

## 2025-01-02 LAB
ALBUMIN SERPL BCG-MCNC: 4.4 G/DL (ref 3.5–5)
ALP SERPL-CCNC: 59 U/L (ref 34–104)
ALT SERPL W P-5'-P-CCNC: 12 U/L (ref 7–52)
ANION GAP SERPL CALCULATED.3IONS-SCNC: 6 MMOL/L (ref 4–13)
AST SERPL W P-5'-P-CCNC: 13 U/L (ref 13–39)
BASOPHILS # BLD AUTO: 0.03 THOUSANDS/ΜL (ref 0–0.1)
BASOPHILS NFR BLD AUTO: 0 % (ref 0–1)
BILIRUB SERPL-MCNC: 0.65 MG/DL (ref 0.2–1)
BUN SERPL-MCNC: 11 MG/DL (ref 5–25)
CALCIUM SERPL-MCNC: 9.4 MG/DL (ref 8.4–10.2)
CHLORIDE SERPL-SCNC: 106 MMOL/L (ref 96–108)
CO2 SERPL-SCNC: 23 MMOL/L (ref 21–32)
CREAT SERPL-MCNC: 0.73 MG/DL (ref 0.6–1.3)
EOSINOPHIL # BLD AUTO: 0.31 THOUSAND/ΜL (ref 0–0.61)
EOSINOPHIL NFR BLD AUTO: 2 % (ref 0–6)
ERYTHROCYTE [DISTWIDTH] IN BLOOD BY AUTOMATED COUNT: 12.3 % (ref 11.6–15.1)
GFR SERPL CREATININE-BSD FRML MDRD: 110 ML/MIN/1.73SQ M
GLUCOSE SERPL-MCNC: 92 MG/DL (ref 65–140)
HCT VFR BLD AUTO: 48.6 % (ref 34.8–46.1)
HGB BLD-MCNC: 16.8 G/DL (ref 11.5–15.4)
IMM GRANULOCYTES # BLD AUTO: 0.05 THOUSAND/UL (ref 0–0.2)
IMM GRANULOCYTES NFR BLD AUTO: 0 % (ref 0–2)
LIPASE SERPL-CCNC: 12 U/L (ref 11–82)
LYMPHOCYTES # BLD AUTO: 0.69 THOUSANDS/ΜL (ref 0.6–4.47)
LYMPHOCYTES NFR BLD AUTO: 5 % (ref 14–44)
MCH RBC QN AUTO: 31.9 PG (ref 26.8–34.3)
MCHC RBC AUTO-ENTMCNC: 34.6 G/DL (ref 31.4–37.4)
MCV RBC AUTO: 92 FL (ref 82–98)
MONOCYTES # BLD AUTO: 0.53 THOUSAND/ΜL (ref 0.17–1.22)
MONOCYTES NFR BLD AUTO: 4 % (ref 4–12)
NEUTROPHILS # BLD AUTO: 11.66 THOUSANDS/ΜL (ref 1.85–7.62)
NEUTS SEG NFR BLD AUTO: 89 % (ref 43–75)
NRBC BLD AUTO-RTO: 0 /100 WBCS
PLATELET # BLD AUTO: 228 THOUSANDS/UL (ref 149–390)
PMV BLD AUTO: 10.4 FL (ref 8.9–12.7)
POTASSIUM SERPL-SCNC: 4.1 MMOL/L (ref 3.5–5.3)
PROT SERPL-MCNC: 7.4 G/DL (ref 6.4–8.4)
RBC # BLD AUTO: 5.26 MILLION/UL (ref 3.81–5.12)
SODIUM SERPL-SCNC: 135 MMOL/L (ref 135–147)
WBC # BLD AUTO: 13.27 THOUSAND/UL (ref 4.31–10.16)

## 2025-01-02 PROCEDURE — 96361 HYDRATE IV INFUSION ADD-ON: CPT

## 2025-01-02 PROCEDURE — 83690 ASSAY OF LIPASE: CPT | Performed by: PHYSICIAN ASSISTANT

## 2025-01-02 PROCEDURE — 96374 THER/PROPH/DIAG INJ IV PUSH: CPT

## 2025-01-02 PROCEDURE — 80053 COMPREHEN METABOLIC PANEL: CPT | Performed by: PHYSICIAN ASSISTANT

## 2025-01-02 PROCEDURE — 99283 EMERGENCY DEPT VISIT LOW MDM: CPT

## 2025-01-02 PROCEDURE — 85025 COMPLETE CBC W/AUTO DIFF WBC: CPT | Performed by: PHYSICIAN ASSISTANT

## 2025-01-02 PROCEDURE — 99284 EMERGENCY DEPT VISIT MOD MDM: CPT | Performed by: PHYSICIAN ASSISTANT

## 2025-01-02 PROCEDURE — 36415 COLL VENOUS BLD VENIPUNCTURE: CPT | Performed by: PHYSICIAN ASSISTANT

## 2025-01-02 RX ORDER — ONDANSETRON 2 MG/ML
4 INJECTION INTRAMUSCULAR; INTRAVENOUS ONCE
Status: COMPLETED | OUTPATIENT
Start: 2025-01-02 | End: 2025-01-02

## 2025-01-02 RX ORDER — ONDANSETRON 4 MG/1
4 TABLET, FILM COATED ORAL EVERY 6 HOURS
Qty: 10 TABLET | Refills: 0 | Status: SHIPPED | OUTPATIENT
Start: 2025-01-02

## 2025-01-02 RX ADMIN — SODIUM CHLORIDE 1000 ML: 0.9 INJECTION, SOLUTION INTRAVENOUS at 08:48

## 2025-01-02 RX ADMIN — ONDANSETRON 4 MG: 2 INJECTION INTRAMUSCULAR; INTRAVENOUS at 08:47

## 2025-01-02 NOTE — DISCHARGE INSTRUCTIONS
Zofran for nausea/vomiting  Clear liquids next 12-24 hours, then slowly advance diet as tolerated  Follow up with primary care provider of your choice for any persistent concerns  We have given phone number for [facility]  Return to ED if unable to tolerate oral intake or for worsening symptoms

## 2025-01-02 NOTE — Clinical Note
Jeanine Goodrich was seen and treated in our emergency department on 1/2/2025.                Diagnosis:     Jeanine  .    She may return on this date:     Jeanine Goodrich is excused from work Thursday January 2nd      If you have any questions or concerns, please don't hesitate to call.      Renzo Gilbert PA-C    ______________________________           _______________          _______________  Hospital Representative                              Date                                Time

## 2025-01-02 NOTE — ED PROVIDER NOTES
Time reflects when diagnosis was documented in both MDM as applicable and the Disposition within this note       Time User Action Codes Description Comment    1/2/2025  9:53 AM Renzo Gilbert Add [R11.10,  R19.7] Vomiting and diarrhea           ED Disposition       ED Disposition   Discharge    Condition   Stable    Date/Time   Thu Jan 2, 2025  9:53 AM    Comment   Jeanine Kp discharge to home/self care.                   Assessment & Plan       Medical Decision Making  Patient presenting with signs and symptoms consistent with gastroenteritis  Food poisoning on differential dx but less likely  Labs reviewed. Pt has leukocytosis which historically is not new  Abd soft non tender  IV fluids and zofran given  Pt tolerated oral fluid challenge  Will d/c home with rx zofran  Clear liquids next 12-24 hours  Advised follow up with PCP of her choice or [facility] to establish a PCP  Return precautions reviewed. Abdomen soft, non tender at time of discharge     Amount and/or Complexity of Data Reviewed  Labs: ordered.    Risk  Prescription drug management.             Medications   sodium chloride 0.9 % bolus 1,000 mL (1,000 mL Intravenous New Bag 1/2/25 0848)   ondansetron (ZOFRAN) injection 4 mg (4 mg Intravenous Given 1/2/25 0847)       ED Risk Strat Scores                          SBIRT 22yo+      Flowsheet Row Most Recent Value   Initial Alcohol Screen: US AUDIT-C     1. How often do you have a drink containing alcohol? 0 Filed at: 01/02/2025 0819   2. How many drinks containing alcohol do you have on a typical day you are drinking?  0 Filed at: 01/02/2025 0819   3a. Male UNDER 65: How often do you have five or more drinks on one occasion? 0 Filed at: 01/02/2025 0819   3b. FEMALE Any Age, or MALE 65+: How often do you have 4 or more drinks on one occassion? 0 Filed at: 01/02/2025 0819   Audit-C Score 0 Filed at: 01/02/2025 0819   YESENIA: How many times in the past year have you...    Used an illegal drug or used a  "prescription medication for non-medical reasons? Never Filed at: 2025 0819                            History of Present Illness       Chief Complaint   Patient presents with    Vomiting     Vomiting and diarrhea since 3am. Pt states \"I think I have food poisoning\".       Past Medical History:   Diagnosis Date    Abdominal pain     Altered bowel habits     constipation or diarrhea    Anxiety     Asthma     Back pain     Cellulitis     Resolved 2014     Contact lens/glasses fitting     Cryptic tonsil     Last assessed 2014     Disease of thyroid gland     hypothyroid    GERD (gastroesophageal reflux disease)     Hand paresthesia     Last assessed 2016     Heel pain 2016    Heel pain, bilateral     left heel injected 17    Kidney stone     Loss of appetite     Resolved 2017     Normal vaginal delivery 2020      Past Surgical History:   Procedure Laterality Date    COLONOSCOPY N/A 10/2/2017    Procedure: COLONOSCOPY;  Surgeon: Tenzin Muller MD;  Location: [facility];  Service: Gastroenterology    ESOPHAGOGASTRODUODENOSCOPY N/A 10/2/2017    Procedure: ESOPHAGOGASTRODUODENOSCOPY (EGD);  Surgeon: Tenzin Muller MD;  Location: [facility];  Service: Gastroenterology    INDUCED       KNEE ARTHROSCOPY Left     cartilage shaving    UPPER GASTROINTESTINAL ENDOSCOPY        Family History   Problem Relation Age of Onset    Anxiety disorder Mother     Thyroid disease unspecified Mother         over or under active not sure    Colon polyps Mother 52        Precancerous colon polyps removed    Hypothyroidism Sister     Asthma Sister     Cancer Family     Vision loss Maternal Uncle       Social History     Tobacco Use    Smoking status: Every Day     Current packs/day: 0.50     Average packs/day: 0.5 packs/day for 13.0 years (6.5 ttl pk-yrs)     Types: Cigarettes     Passive exposure: Current    Smokeless tobacco: Never   Vaping Use    Vaping status: Never Used   Substance Use " Topics    Alcohol use: Not Currently    Drug use: Yes     Frequency: 7.0 times per week     Types: Marijuana     Comment: marijuana helps THC 11/09/21 1800      E-Cigarette/Vaping    E-Cigarette Use Never User       E-Cigarette/Vaping Substances    Nicotine No     THC No     CBD No     Flavoring No     Other No     Unknown No       I have reviewed and agree with the history as documented.     Patient is a 28 yo wf with history of hypothyroidism and depression who reports multiple episodes of vomiting and diarrhea beginning 3 am this morning. No fever. No known sick contacts or recent travel. Works in a . Suspects she may food poisoning. Ate a packaged meal delivered to her that had been sitting out for 2 days in the cold. No other complaints. Smokes 1/2 ppd and smokes marijuana. Denies alcohol or illicit drug use         Review of Systems   Constitutional:  Negative for chills and fever.   Respiratory:  Negative for shortness of breath.    Cardiovascular:  Negative for chest pain.   Gastrointestinal:  Positive for diarrhea, nausea and vomiting. Negative for abdominal pain.   Genitourinary:  Negative for dysuria.           Objective       ED Triage Vitals [01/02/25 0817]   Temperature Pulse Blood Pressure Respirations SpO2 Patient Position - Orthostatic VS   98 °F (36.7 °C) 104 133/62 16 99 % Sitting      Temp Source Heart Rate Source BP Location FiO2 (%) Pain Score    Oral Monitor Left arm -- 3      Vitals      Date and Time Temp Pulse SpO2 Resp BP Pain Score FACES Pain Rating User   01/02/25 0953 98.7 °F (37.1 °C) 82 100 % 12 115/70 -- -- SM   01/02/25 0817 98 °F (36.7 °C) 104 99 % 16 133/62 3 -- SM            Physical Exam  Vitals and nursing note reviewed.   Constitutional:       General: She is not in acute distress.     Appearance: Normal appearance. She is not ill-appearing, toxic-appearing or diaphoretic.   HENT:      Head: Normocephalic and atraumatic.      Nose: Nose normal.      Mouth/Throat:       Mouth: Mucous membranes are dry.      Pharynx: Oropharynx is clear.   Eyes:      Extraocular Movements: Extraocular movements intact.      Conjunctiva/sclera: Conjunctivae normal.      Pupils: Pupils are equal, round, and reactive to light.   Cardiovascular:      Rate and Rhythm: Normal rate and regular rhythm.      Pulses: Normal pulses.      Heart sounds: Normal heart sounds.   Pulmonary:      Effort: Pulmonary effort is normal.      Breath sounds: Normal breath sounds.   Abdominal:      General: Abdomen is flat. Bowel sounds are normal.      Palpations: Abdomen is soft.      Tenderness: There is no abdominal tenderness. There is no right CVA tenderness, left CVA tenderness, guarding or rebound.   Musculoskeletal:         General: Normal range of motion.      Cervical back: Normal range of motion and neck supple.   Skin:     General: Skin is warm and dry.      Capillary Refill: Capillary refill takes less than 2 seconds.   Neurological:      General: No focal deficit present.      Mental Status: She is alert and oriented to person, place, and time. Mental status is at baseline.         Results Reviewed       Procedure Component Value Units Date/Time    Lipase [[id number]]  (Normal) Collected: 01/02/25 0846    Lab Status: Final result Specimen: Blood from Arm, Right Updated: 01/02/25 0914     Lipase 12 u/L     Comprehensive metabolic panel [[id number]] Collected: 01/02/25 0846    Lab Status: Final result Specimen: Blood from Arm, Right Updated: 01/02/25 0914     Sodium 135 mmol/L      Potassium 4.1 mmol/L      Chloride 106 mmol/L      CO2 23 mmol/L      ANION GAP 6 mmol/L      BUN 11 mg/dL      Creatinine 0.73 mg/dL      Glucose 92 mg/dL      Calcium 9.4 mg/dL      AST 13 U/L      ALT 12 U/L      Alkaline Phosphatase 59 U/L      Total Protein 7.4 g/dL      Albumin 4.4 g/dL      Total Bilirubin 0.65 mg/dL      eGFR 110 ml/min/1.73sq m     Narrative:      National Kidney Disease Foundation guidelines for Chronic Kidney  Disease (CKD):     Stage 1 with normal or high GFR (GFR > 90 mL/min/1.73 square meters)    Stage 2 Mild CKD (GFR = 60-89 mL/min/1.73 square meters)    Stage 3A Moderate CKD (GFR = 45-59 mL/min/1.73 square meters)    Stage 3B Moderate CKD (GFR = 30-44 mL/min/1.73 square meters)    Stage 4 Severe CKD (GFR = 15-29 mL/min/1.73 square meters)    Stage 5 End Stage CKD (GFR <15 mL/min/1.73 square meters)  Note: GFR calculation is accurate only with a steady state creatinine    CBC and differential [[id number]]  (Abnormal) Collected: 01/02/25 0846    Lab Status: Final result Specimen: Blood from Arm, Right Updated: 01/02/25 0853     WBC 13.27 Thousand/uL      RBC 5.26 Million/uL      Hemoglobin 16.8 g/dL      Hematocrit 48.6 %      MCV 92 fL      MCH 31.9 pg      MCHC 34.6 g/dL      RDW 12.3 %      MPV 10.4 fL      Platelets 228 Thousands/uL      nRBC 0 /100 WBCs      Segmented % 89 %      Immature Grans % 0 %      Lymphocytes % 5 %      Monocytes % 4 %      Eosinophils Relative 2 %      Basophils Relative 0 %      Absolute Neutrophils 11.66 Thousands/µL      Absolute Immature Grans 0.05 Thousand/uL      Absolute Lymphocytes 0.69 Thousands/µL      Absolute Monocytes 0.53 Thousand/µL      Eosinophils Absolute 0.31 Thousand/µL      Basophils Absolute 0.03 Thousands/µL             No orders to display       Procedures    ED Medication and Procedure Management   Prior to Admission Medications   Prescriptions Last Dose Informant Patient Reported? Taking?   albuterol (PROVENTIL HFA,VENTOLIN HFA) 90 mcg/act inhaler   No No   Sig: INHALE TWO PUFFS BY MOUTH EVERY 6 HOURS AS NEEDED FOR WHEEZING   levothyroxine 150 mcg tablet 1/1/2025 Morning  No Yes   Sig: Take 1 tablet (150 mcg total) by mouth daily   loratadine (CLARITIN) 10 mg tablet   No No   Sig: TAKE ONE TABLET BY MOUTH EVERY DAY   Patient not taking: Reported on 10/15/2024   sertraline (Zoloft) 50 mg tablet 1/1/2025 Morning  No Yes   Sig: Take 1 tablet (50 mg total) by mouth  daily      Facility-Administered Medications: None     Patient's Medications   Discharge Prescriptions    ONDANSETRON (ZOFRAN) 4 MG TABLET    Take 1 tablet (4 mg total) by mouth every 6 (six) hours       Start Date: 1/2/2025  End Date: --       Order Dose: 4 mg       Quantity: 10 tablet    Refills: 0     No discharge procedures on file.  ED SEPSIS DOCUMENTATION   Time reflects when diagnosis was documented in both MDM as applicable and the Disposition within this note       Time User Action Codes Description Comment    1/2/2025  9:53 AM Renzo Gilbert Add [R11.10,  R19.7] Vomiting and diarrhea                  Renzo Gilbert PA-C  01/02/25 1001

## 2025-03-18 ENCOUNTER — TELEPHONE (OUTPATIENT)
Age: 30
End: 2025-03-18

## 2025-04-10 ENCOUNTER — RESULTS FOLLOW-UP (OUTPATIENT)
Age: 30
End: 2025-04-10

## 2025-04-10 ENCOUNTER — OFFICE VISIT (OUTPATIENT)
Age: 30
End: 2025-04-10

## 2025-04-10 ENCOUNTER — APPOINTMENT (OUTPATIENT)
Dept: LAB | Facility: HOSPITAL | Age: 30
End: 2025-04-10
Payer: COMMERCIAL

## 2025-04-10 VITALS
DIASTOLIC BLOOD PRESSURE: 91 MMHG | OXYGEN SATURATION: 99 % | RESPIRATION RATE: 18 BRPM | TEMPERATURE: 98 F | HEART RATE: 85 BPM | HEIGHT: 64 IN | WEIGHT: 184.7 LBS | BODY MASS INDEX: 31.53 KG/M2 | SYSTOLIC BLOOD PRESSURE: 133 MMHG

## 2025-04-10 DIAGNOSIS — E03.9 HYPOTHYROIDISM, UNSPECIFIED TYPE: ICD-10-CM

## 2025-04-10 DIAGNOSIS — Z00.00 ANNUAL PHYSICAL EXAM: Primary | ICD-10-CM

## 2025-04-10 LAB
T4 FREE SERPL-MCNC: 0.5 NG/DL (ref 0.61–1.12)
TSH SERPL DL<=0.05 MIU/L-ACNC: 13.47 UIU/ML (ref 0.45–4.5)

## 2025-04-10 PROCEDURE — 36415 COLL VENOUS BLD VENIPUNCTURE: CPT

## 2025-04-10 PROCEDURE — 99385 PREV VISIT NEW AGE 18-39: CPT | Performed by: FAMILY MEDICINE

## 2025-04-10 PROCEDURE — 84439 ASSAY OF FREE THYROXINE: CPT

## 2025-04-10 PROCEDURE — 84443 ASSAY THYROID STIM HORMONE: CPT

## 2025-04-10 NOTE — LETTER
Procedure Request Form: Cervical Cancer Screening      Date Requested: 21  Patient: Breanna Pitts  Patient : 1995   Referring Provider: Berny Swann, MD        Date of Procedure ______Aug/Sept 2020 or most recent report________________       The above patient has informed us that they have completed their   most recent Cervical Cancer Screening at your facility  Please complete   this form and attach all corresponding procedure reports/results  Comments __________________________________________________________  ____________________________________________________________________  ____________________________________________________________________  ____________________________________________________________________    Facility Completing Procedure _________________________________________    Form Completed By (print name) _______________________________________      Signature __________________________________________________________      These reports are needed for  compliance    Please fax this completed form and a copy of the procedure report to our office located at [place] [address] as soon as possible to [phone] myles Sheth: Phone [phone]    We thank you for your assistance in treating our mutual patient
9

## 2025-04-10 NOTE — PROGRESS NOTES
Adult Annual Physical  Name: Jeanine Goodrich      : 1995      MRN: [id number]  Encounter Provider: Silvia Andrea MD  Encounter Date: 4/10/2025   Encounter department: [facility]    :  Assessment & Plan  Annual physical exam  Presents for annual physical       Hypothyroidism, unspecified type  History of hypothyroidism  No labs since   Has not been on medication for 1 year  Labs ordered  Orders:    TSH, 3rd generation with Free T4 reflex; Future        Preventive Screenings:  - Diabetes Screening: screening up-to-date  - Cholesterol Screening: risks/benefits discussed and patient declines   - Hepatitis C screening: patient declines and risks/benefits discussed   - HIV screening: risks/benefits discussed and patient declines   - Cervical cancer screening: risks/benefits discussed   - Lung cancer screening: screening not indicated     Immunizations:  - Immunizations due: Influenza and Prevnar 20    Counseling/Anticipatory Guidance:  - Alcohol: discussed moderation in alcohol intake and recommendations for healthy alcohol use.   - Drug use: discussed harms of illicit drug use and how it can negatively impact mental/physical health.   - Tobacco use: discussed harms of tobacco use and management options for quitting.   - Dental health: discussed importance of regular tooth brushing, flossing, and dental visits.   - Sexual health: discussed sexually transmitted diseases, partner selection, use of condoms, avoidance of unintended pregnancy, and contraceptive alternatives.   - Diet: discussed recommendations for a healthy/well-balanced diet.   - Exercise: the importance of regular exercise/physical activity was discussed. Recommend exercise 3-5 times per week for at least 30 minutes.   - Injury prevention: discussed safety/seat belts, safety helmets, smoke detectors, carbon monoxide detectors, and smoking near bedding or upholstery.     BMI Counseling: Body mass index  is 31.7 kg/m². The BMI is above normal. Nutrition recommendations include decreasing portion sizes and encouraging healthy choices of fruits and vegetables. Exercise recommendations include exercising 3-5 times per week and strength training exercises. Rationale for BMI follow-up plan is due to patient being overweight or obese.     Depression Screening and Follow-up Plan: Patient's depression screening was positive with a PHQ-9 score of 10.   Continue regular follow-up with their mental health provider who is managing their mental health condition(s). Patient advised to follow-up with PCP for further management.     Tobacco Cessation Counseling: Tobacco cessation counseling was provided. The patient is sincerely urged to quit consumption of tobacco. She is not ready to quit tobacco. Medication options and side effects of medication not discussed. Patient agreed to medication.         History of Present Illness     Adult Annual Physical:  Patient presents for annual physical.     Diet and Physical Activity:  - Diet/Nutrition: poor diet.  - Exercise: no formal exercise.    Depression Screening:    - PHQ-9 Score: 10    General Health:  - Sleep: sleeps poorly and 4-6 hours of sleep on average.  - Hearing: normal hearing bilateral ears.  - Vision: wears glasses and contacts.  - Dental: no dental visits for > 1 year. dental problems    /GYN Health:  - Follows with GYN: no.   - Menopause: premenopausal.   - Last menstrual cycle: 3/23/2025.   - History of STDs: no  - Contraception: barrier methods.      Review of Systems   Constitutional:  Negative for chills and fever.   HENT:  Negative for ear pain and sore throat.    Eyes:  Negative for pain and visual disturbance.   Respiratory:  Negative for cough and shortness of breath.    Cardiovascular:  Negative for chest pain and palpitations.   Gastrointestinal:  Negative for abdominal pain and vomiting.   Genitourinary:  Negative for dysuria and hematuria.   Musculoskeletal:  " Negative for arthralgias and back pain.   Skin:  Negative for color change and rash.   Neurological:  Negative for seizures and syncope.   All other systems reviewed and are negative.    Medical History Reviewed by provider this encounter:  Tobacco  Allergies  Meds  Problems  Med Hx  Surg Hx  Fam Hx     .  Current Outpatient Medications on File Prior to Visit   Medication Sig Dispense Refill    albuterol (PROVENTIL HFA,VENTOLIN HFA) 90 mcg/act inhaler INHALE TWO PUFFS BY MOUTH EVERY 6 HOURS AS NEEDED FOR WHEEZING (Patient not taking: Reported on 4/10/2025) 8.5 g 0    levothyroxine 150 mcg tablet Take 1 tablet (150 mcg total) by mouth daily 60 tablet 0    [DISCONTINUED] loratadine (CLARITIN) 10 mg tablet TAKE ONE TABLET BY MOUTH EVERY DAY (Patient not taking: Reported on 10/15/2024) 30 tablet 0    [DISCONTINUED] ondansetron (ZOFRAN) 4 mg tablet Take 1 tablet (4 mg total) by mouth every 6 (six) hours 10 tablet 0    [DISCONTINUED] sertraline (Zoloft) 50 mg tablet Take 1 tablet (50 mg total) by mouth daily 90 tablet 0     No current facility-administered medications on file prior to visit.      Social History     Tobacco Use    Smoking status: Every Day     Current packs/day: 0.50     Average packs/day: 0.5 packs/day for 13.0 years (6.5 ttl pk-yrs)     Types: Cigarettes     Passive exposure: Current    Smokeless tobacco: Never   Vaping Use    Vaping status: Never Used   Substance and Sexual Activity    Alcohol use: Not Currently    Drug use: Yes     Frequency: 7.0 times per week     Types: Marijuana     Comment: marijuana helps THC 11/09/21 1800    Sexual activity: Yes     Birth control/protection: Pill       Objective   /91 (BP Location: Right arm, Patient Position: Sitting, Cuff Size: Standard)   Pulse 85   Temp 98 °F (36.7 °C) (Oral)   Resp 18   Ht 5' 4\" (1.626 m)   Wt 83.8 kg (184 lb 11.2 oz)   SpO2 99%   BMI 31.70 kg/m²     Physical Exam  Vitals and nursing note reviewed.   Constitutional:      "  General: She is not in acute distress.     Appearance: She is well-developed.   HENT:      Head: Normocephalic and atraumatic.      Right Ear: External ear normal.      Left Ear: External ear normal.      Nose: Nose normal.      Mouth/Throat:      Mouth: Mucous membranes are moist.   Eyes:      Extraocular Movements: Extraocular movements intact.      Conjunctiva/sclera: Conjunctivae normal.   Cardiovascular:      Rate and Rhythm: Normal rate and regular rhythm.      Pulses: Normal pulses.      Heart sounds: Normal heart sounds. No murmur heard.  Pulmonary:      Effort: Pulmonary effort is normal. No respiratory distress.      Breath sounds: Normal breath sounds.   Abdominal:      General: Abdomen is flat.      Palpations: Abdomen is soft.      Tenderness: There is no abdominal tenderness.   Musculoskeletal:         General: No swelling.      Cervical back: Normal range of motion and neck supple.   Skin:     General: Skin is warm and dry.      Capillary Refill: Capillary refill takes less than 2 seconds.   Neurological:      Mental Status: She is alert and oriented to person, place, and time.   Psychiatric:         Mood and Affect: Mood normal.         Behavior: Behavior normal.         Thought Content: Thought content normal.         Judgment: Judgment normal.

## 2025-04-10 NOTE — PATIENT INSTRUCTIONS
"Patient Education     Routine physical for adults   The Basics   Written by the doctors and editors at [facility]   What is a physical? -- A physical is a routine visit, or \"check-up,\" with your doctor. You might also hear it called a \"wellness visit\" or \"preventive visit.\"  During each visit, the doctor will:   Ask about your physical and mental health   Ask about your habits, behaviors, and lifestyle   Do an exam   Give you vaccines if needed   Talk to you about any medicines you take   Give advice about your health   Answer your questions  Getting regular check-ups is an important part of taking care of your health. It can help your doctor find and treat any problems you have. But it's also important for preventing health problems.  A routine physical is different from a \"sick visit.\" A sick visit is when you see a doctor because of a health concern or problem. Since physicals are scheduled ahead of time, you can think about what you want to ask the doctor.  How often should I get a physical? -- It depends on your age and health. In general, for people age 21 years and older:   If you are younger than 50 years, you might be able to get a physical every 3 years.   If you are 50 years or older, your doctor might recommend a physical every year.  If you have an ongoing health condition, like diabetes or high blood pressure, your doctor will probably want to see you more often.  What happens during a physical? -- In general, each visit will include:   Physical exam - The doctor or nurse will check your height, weight, heart rate, and blood pressure. They will also look at your eyes and ears. They will ask about how you are feeling and whether you have any symptoms that bother you.   Medicines - It's a good idea to bring a list of all the medicines you take to each doctor visit. Your doctor will talk to you about your medicines and answer any questions. Tell them if you are having any side effects that bother you. You " "should also tell them if you are having trouble paying for any of your medicines.   Habits and behaviors - This includes:   Your diet   Your exercise habits   Whether you smoke, drink alcohol, or use drugs   Whether you are sexually active   Whether you feel safe at home  Your doctor will talk to you about things you can do to improve your health and lower your risk of health problems. They will also offer help and support. For example, if you want to quit smoking, they can give you advice and might prescribe medicines. If you want to improve your diet or get more physical activity, they can help you with this, too.   Lab tests, if needed - The tests you get will depend on your age and situation. For example, your doctor might want to check your:   Cholesterol   Blood sugar   Iron level   Vaccines - The recommended vaccines will depend on your age, health, and what vaccines you already had. Vaccines are very important because they can prevent certain serious or deadly infections.   Discussion of screening - \"Screening\" means checking for diseases or other health problems before they cause symptoms. Your doctor can recommend screening based on your age, risk, and preferences. This might include tests to check for:   Cancer, such as breast, prostate, cervical, ovarian, colorectal, prostate, lung, or skin cancer   Sexually transmitted infections, such as chlamydia and gonorrhea   Mental health conditions like depression and anxiety  Your doctor will talk to you about the different types of screening tests. They can help you decide which screenings to have. They can also explain what the results might mean.   Answering questions - The physical is a good time to ask the doctor or nurse questions about your health. If needed, they can refer you to other doctors or specialists, too.  Adults older than 65 years often need other care, too. As you get older, your doctor will talk to you about:   How to prevent falling at " home   Hearing or vision tests   Memory testing   How to take your medicines safely   Making sure that you have the help and support you need at home  All topics are updated as new evidence becomes available and our peer review process is complete.  This topic retrieved from [facility] on: May 02, 2024.  Topic 404912 Version 1.0  Release: 32.4.3 - C32.122  © 2024 UpToDate, Inc. and/or its affiliates. All rights reserved.  Consumer Information Use and Disclaimer   Disclaimer: This generalized information is a limited summary of diagnosis, treatment, and/or medication information. It is not meant to be comprehensive and should be used as a tool to help the user understand and/or assess potential diagnostic and treatment options. It does NOT include all information about conditions, treatments, medications, side effects, or risks that may apply to a specific patient. It is not intended to be medical advice or a substitute for the medical advice, diagnosis, or treatment of a health care provider based on the health care provider's examination and assessment of a patient's specific and unique circumstances. Patients must speak with a health care provider for complete information about their health, medical questions, and treatment options, including any risks or benefits regarding use of medications. This information does not endorse any treatments or medications as safe, effective, or approved for treating a specific patient. UpToDate, Inc. and its affiliates disclaim any warranty or liability relating to this information or the use thereof.The use of this information is governed by the Terms of Use, available at https://www.[employer].com/en/know/clinical-effectiveness-terms. 2024© UpToDate, Inc. and its affiliates and/or licensors. All rights reserved.  Copyright   © 2024 UpToDate, Inc. and/or its affiliates. All rights reserved.

## 2025-04-10 NOTE — ASSESSMENT & PLAN NOTE
History of hypothyroidism  No labs since 2022  Has not been on medication for 1 year  Labs ordered  Orders:    TSH, 3rd generation with Free T4 reflex; Future

## 2025-04-11 RX ORDER — LEVOTHYROXINE SODIUM 150 UG/1
150 TABLET ORAL DAILY
Qty: 90 TABLET | Refills: 1 | Status: SHIPPED | OUTPATIENT
Start: 2025-04-11 | End: 2025-10-08

## 2025-04-29 ENCOUNTER — OFFICE VISIT (OUTPATIENT)
Age: 30
End: 2025-04-29

## 2025-04-29 VITALS
WEIGHT: 187.7 LBS | SYSTOLIC BLOOD PRESSURE: 138 MMHG | TEMPERATURE: 98.1 F | BODY MASS INDEX: 32.22 KG/M2 | DIASTOLIC BLOOD PRESSURE: 98 MMHG | HEART RATE: 108 BPM | OXYGEN SATURATION: 100 %

## 2025-04-29 DIAGNOSIS — R22.0 FACIAL SWELLING: Primary | ICD-10-CM

## 2025-04-29 PROCEDURE — 99213 OFFICE O/P EST LOW 20 MIN: CPT | Performed by: FAMILY MEDICINE

## 2025-04-29 RX ORDER — PREDNISONE 20 MG/1
40 TABLET ORAL DAILY
Qty: 10 TABLET | Refills: 0 | Status: SHIPPED | OUTPATIENT
Start: 2025-04-29 | End: 2025-05-04

## 2025-04-29 NOTE — LETTER
April 29, 2025     Patient: Jeanine Goodrich  YOB: 1995  Date of Visit: 4/29/2025      To Whom it May Concern:    Jeanine Goodrich is under my professional care. Jeanine was seen in my office on 4/29/2025. Please excuse Jeanine from work on 4/29 and 4/30. She can return sooner if symptoms are improving.     If you have any questions or concerns, please don't hesitate to call.         Sincerely,          Artemio Mike MD        CC: No Recipients

## 2025-04-29 NOTE — PROGRESS NOTES
Name: Jeanine Goodrich      : 1995      MRN: [id number]  Encounter Provider: Artemio Mike MD  Encounter Date: 2025   Encounter department: [facility]  :  Assessment & Plan  Facial swelling  Reports left sided facial swelling since this morning. Patient states that it is very painful on that side, rates as 4/10 at this time. Painful to open mouth completely, but able to do it. Also has nasal congestion and unable to breathe through nose. No fevers or chills. States that she began feeling pain on left side last night and took zyrtec which did not help much. Has history of sinus issues. No change in skin products, food or anything else indicating an acute allergic reaction.     Trial prednisone burst of 40 mg for 5 days to see if this helps with acute inflammation  Patient that if she has a fever or worsening symptoms to please contact office  Consider Augmentin antibiotics at that time              History of Present Illness   Here today for left-sided facial swelling.      Review of Systems   Constitutional:  Negative for chills and fever.   HENT:  Positive for facial swelling (left). Negative for ear pain and sore throat.    Eyes:  Negative for pain and visual disturbance.   Respiratory:  Negative for cough and shortness of breath.    Cardiovascular:  Negative for chest pain and palpitations.   Gastrointestinal:  Negative for abdominal pain and vomiting.   Genitourinary:  Negative for dysuria and hematuria.   Musculoskeletal:  Negative for arthralgias and back pain.   Skin:  Negative for color change and rash.   Neurological:  Negative for seizures and syncope.   All other systems reviewed and are negative.      Objective   /98 (BP Location: Left arm, Patient Position: Sitting, Cuff Size: Standard)   Pulse (!) 108   Temp 98.1 °F (36.7 °C) (Tympanic)   Wt 85.1 kg (187 lb 11.2 oz)   SpO2 100%   BMI 32.22 kg/m²      Physical Exam  Constitutional:        Appearance: Normal appearance.   HENT:      Head: Normocephalic and atraumatic. No right periorbital erythema or left periorbital erythema.      Comments: Left-sided facial swelling associated with some erythema.  Tender to palpation.  Able to open eyes and jaw completely with some pain.     Mouth/Throat:      Mouth: Mucous membranes are moist.   Eyes:      General:         Right eye: No discharge.         Left eye: No discharge.      Conjunctiva/sclera: Conjunctivae normal.   Cardiovascular:      Rate and Rhythm: Normal rate and regular rhythm.      Pulses: Normal pulses.      Heart sounds: Normal heart sounds.   Pulmonary:      Effort: Pulmonary effort is normal. No respiratory distress.      Breath sounds: Normal breath sounds.   Abdominal:      General: Bowel sounds are normal.      Palpations: Abdomen is soft.      Tenderness: There is no abdominal tenderness.   Musculoskeletal:      Cervical back: Neck supple.      Right lower leg: No edema.      Left lower leg: No edema.   Skin:     General: Skin is warm and dry.      Capillary Refill: Capillary refill takes less than 2 seconds.   Neurological:      Mental Status: She is alert.

## (undated) DEVICE — "MAJ-901 WATER CONTAINER SET CV-160/140": Brand: WATER CONTAINER

## (undated) DEVICE — LUBRICANT SURGILUBE TUBE 4 OZ  FLIP TOP

## (undated) DEVICE — MEDI-VAC YANKAUER SUCTION HANDLE: Brand: CARDINAL HEALTH

## (undated) DEVICE — "MH-438 A/W VLVE F/140 EVIS-140": Brand: AIR/WATER VALVE

## (undated) DEVICE — "MB-142 MOUTHPIECE": Brand: MOUTHPIECE

## (undated) DEVICE — GAUZE SPONGES,16 PLY: Brand: CURITY

## (undated) DEVICE — TUBING AUX CHANNEL

## (undated) DEVICE — SOLIDIFIER FLUID WASTE CONTROL 1500ML

## (undated) DEVICE — BITE BLOCK ENDO 60FR ADLT MAXI  DISP W/STRAP

## (undated) DEVICE — "MH-443 SUCTION VALVE F/EVIS140 EVIS160": Brand: SUCTION VALVE

## (undated) DEVICE — DISPOSABLE BIOPSY VALVE MAJ-1555: Brand: SINGLE USE BIOPSY VALVE (STERILE)

## (undated) DEVICE — 1200CC GUARDIAN II: Brand: GUARDIAN

## (undated) DEVICE — SINGLE-USE BIOPSY FORCEPS: Brand: RADIAL JAW 4

## (undated) DEVICE — TUBING BUBBLE CLEAR 5MM X 100 FT NS

## (undated) DEVICE — TRAVELKIT CONTAINS FIRST STEP KIT (200ML EP-4 KIT) AND SOILED SCOPE BAG - 1 KIT: Brand: TRAVELKIT CONTAINS FIRST STEP KIT AND SOILED SCOPE BAG

## (undated) DEVICE — BRUSH ENDO CLEANING DBL-HEADER

## (undated) DEVICE — AIRLIFE™  ADULT CUSHION NASAL CANNULA WITH 7 FOOT (2.1 M) CRUSH-RESISTANT OXYGEN TUBING, AND U/CONNECT-IT ADAPTER: Brand: AIRLIFE™

## (undated) DEVICE — GLOVE EXAM NON-STRL NTRL PLUS LRG PF

## (undated) DEVICE — 60 ML SYRINGE,REGULAR TIP: Brand: MONOJECT